# Patient Record
Sex: MALE | Race: WHITE | NOT HISPANIC OR LATINO | Employment: FULL TIME | URBAN - NONMETROPOLITAN AREA
[De-identification: names, ages, dates, MRNs, and addresses within clinical notes are randomized per-mention and may not be internally consistent; named-entity substitution may affect disease eponyms.]

---

## 2019-04-04 ENCOUNTER — OFFICE VISIT (OUTPATIENT)
Dept: URGENT CARE | Facility: PHYSICIAN GROUP | Age: 54
End: 2019-04-04
Payer: COMMERCIAL

## 2019-04-04 VITALS
HEIGHT: 68 IN | TEMPERATURE: 98.5 F | DIASTOLIC BLOOD PRESSURE: 90 MMHG | WEIGHT: 253.2 LBS | SYSTOLIC BLOOD PRESSURE: 140 MMHG | OXYGEN SATURATION: 97 % | HEART RATE: 79 BPM | BODY MASS INDEX: 38.37 KG/M2 | RESPIRATION RATE: 16 BRPM

## 2019-04-04 DIAGNOSIS — I10 ESSENTIAL HYPERTENSION: ICD-10-CM

## 2019-04-04 DIAGNOSIS — E11.9 TYPE 2 DIABETES MELLITUS WITHOUT COMPLICATION, UNSPECIFIED WHETHER LONG TERM INSULIN USE (HCC): ICD-10-CM

## 2019-04-04 DIAGNOSIS — E78.2 MIXED HYPERLIPIDEMIA: ICD-10-CM

## 2019-04-04 LAB — GLUCOSE BLD-MCNC: 337 MG/DL (ref 70–100)

## 2019-04-04 PROCEDURE — 82962 GLUCOSE BLOOD TEST: CPT | Performed by: FAMILY MEDICINE

## 2019-04-04 PROCEDURE — 99214 OFFICE O/P EST MOD 30 MIN: CPT | Performed by: FAMILY MEDICINE

## 2019-04-04 RX ORDER — LISINOPRIL 40 MG/1
40 TABLET ORAL DAILY
Qty: 90 TAB | Refills: 1 | Status: SHIPPED | OUTPATIENT
Start: 2019-04-04 | End: 2019-06-06 | Stop reason: SDUPTHER

## 2019-04-04 RX ORDER — PRAVASTATIN SODIUM 40 MG
40 TABLET ORAL
Qty: 90 TAB | Refills: 1 | Status: SHIPPED | OUTPATIENT
Start: 2019-04-04 | End: 2020-10-06

## 2019-04-04 ASSESSMENT — ENCOUNTER SYMPTOMS
DIZZINESS: 0
FOCAL WEAKNESS: 0
FEVER: 0
HEMOPTYSIS: 0
CHILLS: 0
ORTHOPNEA: 0
SHORTNESS OF BREATH: 0

## 2019-04-04 NOTE — PROGRESS NOTES
"Subjective:      Joey Dawson is a 53 y.o. male who presents with Medication Refill (has been off all his medications for 10 months has new appt with primary in May)      - This is a very pleasant, well and non-toxic appearing 53 y.o. male with complaints of out of all his HTN/DM2/HL meds for past 10 months. hasnt checked his sugars in \"awhile\".  No NVFC/CP/SOB. No headache or vision changes. Does have some polyuria past month          ALLERGIES:  Food     PMH:  Past Medical History:   Diagnosis Date   • Hyperlipidemia    • Hypertension    • Stroke (HCC)    • Type II or unspecified type diabetes mellitus without mention of complication, not stated as uncontrolled         PSH:  No past surgical history on file.    MEDS:    Current Outpatient Prescriptions:   •  Dulaglutide (TRULICITY) 1.5 MG/0.5ML Solution Pen-injector, Inject 1.5 mg as instructed every 7 days for 90 days., Disp: 1 PEN, Rfl: 12  •  lisinopril (PRINIVIL, ZESTRIL) 40 MG tablet, Take 1 Tab by mouth every day., Disp: 90 Tab, Rfl: 1  •  pravastatin (PRAVACHOL) 40 MG tablet, Take 1 Tab by mouth every bedtime., Disp: 90 Tab, Rfl: 1  •  metformin (GLUCOPHAGE) 1000 MG tablet, Take 1 Tab by mouth every day., Disp: 90 Tab, Rfl: 1  •  aspirin EC (ECOTRIN) 81 MG TBEC, Take 81 mg by mouth every day., Disp: , Rfl:   •  Multiple Vitamins-Minerals (CENTRUM) TABS, Take 1 Tab by mouth every day. (Patient not taking: Reported on 4/4/2019), Disp: 100 Tab, Rfl: 3  •  vitamin D3, cholecalciferol, 1000 UNIT TABS, Take 2 Tabs by mouth every day. (Patient not taking: Reported on 4/4/2019), Disp: 100 Tab, Rfl: 3    ** I have documented what I find to be significant in regards to past medical, social, family and surgical history  in my HPI or under PMH/PSH/FH review section, otherwise it is contributory **           HPI    Review of Systems   Constitutional: Negative for chills and fever.   Respiratory: Negative for hemoptysis and shortness of breath.    Cardiovascular: " "Negative for chest pain and orthopnea.   Neurological: Negative for dizziness and focal weakness.          Objective:     /90   Pulse 79   Temp 36.9 °C (98.5 °F) (Temporal)   Resp 16   Ht 1.727 m (5' 8\")   Wt 114.9 kg (253 lb 3.2 oz)   SpO2 97%   BMI 38.50 kg/m²      Physical Exam   Constitutional: He appears well-developed. No distress.   HENT:   Head: Normocephalic and atraumatic.   Mouth/Throat: Oropharynx is clear and moist.   Eyes: Conjunctivae are normal.   Neck: Neck supple.   Cardiovascular: Regular rhythm.    No murmur heard.  Pulmonary/Chest: Effort normal. No respiratory distress.   Neurological: He is alert. He exhibits normal muscle tone.   Skin: Skin is warm and dry.   Psychiatric: He has a normal mood and affect. Judgment normal.   Nursing note and vitals reviewed.              Assessment/Plan:         1. Essential hypertension  lisinopril (PRINIVIL, ZESTRIL) 40 MG tablet   2. Type 2 diabetes mellitus without complication, unspecified whether long term insulin use (HCC)  Dulaglutide (TRULICITY) 1.5 MG/0.5ML Solution Pen-injector    metformin (GLUCOPHAGE) 1000 MG tablet    POCT Glucose   3. Mixed hyperlipidemia  pravastatin (PRAVACHOL) 40 MG tablet             Dx & d/c instructions discussed w/ patient and/or family members.     ER precautions (worsening signs symptoms and when to go to ER) discussed.    Follow up w/ PCP in 2-3 days to make sure symptoms improving and no further intervention/treatment and/or work-up needed was advised, ER if feeling worse or not improving in 2 days.    Possible side effects (i.e. Rash, GI upset/constipation, sedation, elevation of BP or sugars) of any medications given discussed.     Patient left in stable condition              "

## 2019-05-29 ENCOUNTER — OFFICE VISIT (OUTPATIENT)
Dept: MEDICAL GROUP | Facility: PHYSICIAN GROUP | Age: 54
End: 2019-05-29
Payer: COMMERCIAL

## 2019-05-29 VITALS
DIASTOLIC BLOOD PRESSURE: 72 MMHG | HEIGHT: 68 IN | SYSTOLIC BLOOD PRESSURE: 168 MMHG | HEART RATE: 93 BPM | OXYGEN SATURATION: 97 % | BODY MASS INDEX: 38.34 KG/M2 | RESPIRATION RATE: 20 BRPM | WEIGHT: 253 LBS | TEMPERATURE: 98 F

## 2019-05-29 DIAGNOSIS — Z79.4 TYPE 2 DIABETES MELLITUS WITH OTHER SPECIFIED COMPLICATION, WITH LONG-TERM CURRENT USE OF INSULIN (HCC): ICD-10-CM

## 2019-05-29 DIAGNOSIS — E66.9 OBESITY (BMI 30-39.9): ICD-10-CM

## 2019-05-29 DIAGNOSIS — E11.69 TYPE 2 DIABETES MELLITUS WITH OTHER SPECIFIED COMPLICATION, WITH LONG-TERM CURRENT USE OF INSULIN (HCC): ICD-10-CM

## 2019-05-29 DIAGNOSIS — Z12.11 SCREENING FOR COLON CANCER: ICD-10-CM

## 2019-05-29 DIAGNOSIS — I10 ESSENTIAL HYPERTENSION: ICD-10-CM

## 2019-05-29 DIAGNOSIS — M79.89 SOFT TISSUE MASS: ICD-10-CM

## 2019-05-29 DIAGNOSIS — Z86.73 HISTORY OF CVA (CEREBROVASCULAR ACCIDENT) WITHOUT RESIDUAL DEFICITS: ICD-10-CM

## 2019-05-29 LAB
HBA1C MFR BLD: 11.1 % (ref 0–5.6)
INT CON NEG: NEGATIVE
INT CON POS: POSITIVE

## 2019-05-29 PROCEDURE — 83036 HEMOGLOBIN GLYCOSYLATED A1C: CPT | Performed by: NURSE PRACTITIONER

## 2019-05-29 PROCEDURE — 99214 OFFICE O/P EST MOD 30 MIN: CPT | Performed by: NURSE PRACTITIONER

## 2019-05-29 PROCEDURE — 92250 FUNDUS PHOTOGRAPHY W/I&R: CPT | Mod: TC | Performed by: NURSE PRACTITIONER

## 2019-05-29 RX ORDER — PEN NEEDLE, DIABETIC 30 GX5/16"
1 NEEDLE, DISPOSABLE MISCELLANEOUS DAILY
Qty: 100 EACH | Refills: 3 | Status: SHIPPED | OUTPATIENT
Start: 2019-05-29 | End: 2019-06-06 | Stop reason: SDUPTHER

## 2019-05-29 ASSESSMENT — PATIENT HEALTH QUESTIONNAIRE - PHQ9: CLINICAL INTERPRETATION OF PHQ2 SCORE: 0

## 2019-05-29 ASSESSMENT — PAIN SCALES - GENERAL: PAINLEVEL: NO PAIN

## 2019-05-29 NOTE — PATIENT INSTRUCTIONS
See me in 3 months with labs    Will have you start levemir, 15 units daily, after 1 week, increase  By 2 units every 3 days to keep FBS less than 130, stop at 25 units    Increase metformin to twice a day

## 2019-05-29 NOTE — PROGRESS NOTES
Chief Complaint   Patient presents with   • Diabetes   • Other     last optometry appt  11/2018          This is a 54 y.o.male patient that presents today with the following: Establish care with new PCP, discuss acute and chronic conditions    Obesity (BMI 30-39.9)  Is a chronic condition, uncontrolled.  Patient's weight is 253 pounds, BMI 38.47.  He does understand the risks associated with his weight, tells me that he is going to get back on track with healthy eating and regular exercise as he has let this go over the past several months.    Hypertension  This is a chronic condition,, stable and usually well controlled on current medications.  But he does tell me that he has not been taking his blood pressure medications consistently as of late.  He is due for labs, these have been ordered.    History of CVA (cerebrovascular accident) without residual deficits  Patient has remote history of CVA, this was diagnosed with outside provider before establishing care with [facility].  He is not currently followed by neurology.  He is on low-dose aspirin, he denies any residual effects.    DM2 (diabetes mellitus, type 2) ([facility])  This is a chronic condition, uncontrolled.  He does admit to me that he has not been taking his medications consistently, he is on Trulicity and metformin, A1c today was 11.1%.  He does agree to starting long-acting insulin he will start Levemir at 15 units daily and in 1 to 2 weeks he will increase by 2 units every 3 days to keep fasting blood glucose below 130 he is to stop at 25 units.  He will continue on Trulicity as well as metformin.  He is appropriately on ACE inhibitor as well as statin.    Soft tissue mass  Patient has new developed soft tissue mass just over trachea.  He does have a history of multiple lipomas.  Is not painful, it is mobile.  We will get ultrasound.      No visits with results within 1 Month(s) from this visit.   Latest known visit with results is:   Office Visit on  "04/04/2019   Component Date Value   • Glucose - Accu-Ck 04/04/2019 337*         clinical course has been stable    Past Medical History:   Diagnosis Date   • Hyperlipidemia    • Hypertension    • Stroke (HCC)    • Type II or unspecified type diabetes mellitus without mention of complication, not stated as uncontrolled        No past surgical history on file.    Family History   Problem Relation Age of Onset   • Cancer Mother         Breast cancer   • Heart Disease Father         CHF       Food    Current Outpatient Prescriptions Ordered in [facility]   Medication Sig Dispense Refill   • insulin detemir (LEVEMIR FLEXTOUCH) 100 UNIT/ML Solution Pen-injector injection Inject 25 Units as instructed every evening. Start at 15 units, increase by 2 units every 3 days to keep FBG less than 130, stop at 25 units 5 PEN 3   • Insulin Pen Needle (PEN NEEDLES 5/16\") 30G X 8 MM Misc 1 Unknown by Does not apply route every day. To be used with insulin pen 100 Each 3   • metformin (GLUCOPHAGE) 1000 MG tablet Take 1 Tab by mouth 2 times a day. 180 Tab 3   • Dulaglutide (TRULICITY) 1.5 MG/0.5ML Solution Pen-injector Inject 1.5 mg as instructed every 7 days for 90 days. 1 PEN 12   • lisinopril (PRINIVIL, ZESTRIL) 40 MG tablet Take 1 Tab by mouth every day. 90 Tab 1   • pravastatin (PRAVACHOL) 40 MG tablet Take 1 Tab by mouth every bedtime. (Patient taking differently: Take 20 mg by mouth every bedtime.) 90 Tab 1   • aspirin EC (ECOTRIN) 81 MG TBEC Take 81 mg by mouth every day.     • Multiple Vitamins-Minerals (CENTRUM) TABS Take 1 Tab by mouth every day. (Patient not taking: Reported on 5/29/2019) 100 Tab 3   • vitamin D3, cholecalciferol, 1000 UNIT TABS Take 2 Tabs by mouth every day. (Patient not taking: Reported on 5/29/2019) 100 Tab 3     No current Epic-ordered facility-administered medications on file.        Constitutional ROS: No unexpected change in weight, No weakness, No unexplained fevers, sweats, or chills  Pulmonary ROS: No " "chronic cough, sputum, or hemoptysis, No shortness of breath, No recent change in breathing  Cardiovascular ROS: No chest pain, No edema, No palpitations, Positive for hypertension and hyperlipidemia  Gastrointestinal ROS: No abdominal pain, No nausea, vomiting, diarrhea, or constipation  Musculoskeletal/Extremities ROS: No clubbing, No peripheral edema, No pain, redness or swelling on the joints  Neurologic ROS: Normal development, No seizures, No weakness, Positive for history of stroke  Current ROS: Positive per HPI    Physical exam:  BP (!) 168/72 (BP Location: Left arm, Patient Position: Sitting, BP Cuff Size: Adult)   Pulse 93   Temp 36.7 °C (98 °F) (Temporal)   Resp 20   Ht 1.727 m (5' 8\")   Wt 114.8 kg (253 lb)   SpO2 97%   BMI 38.47 kg/m²   General Appearance: Very pleasant middle-aged male, alert, no distress, obese, well-groomed  Skin: Skin color, texture, turgor normal. No rashes or lesions  Neck: Soft tissue mass just over trachea, nontender with palpation, soft and mobile.  Lungs: negative findings: normal respiratory rate and rhythm, lungs clear to auscultation  Heart: negative. RRR without murmur, gallop, or rubs.  No ectopy.  Abdomen: Abdomen soft, non-tender. BS normal. No masses,  No organomegaly  Musculoskeletal: negative findings: no evidence of joint instability, no evidence of muscle atrophy, no deformities present  Neurologic: intact, CN II through XII grossly intact  Diabetic Foot Exam: No ulcers, erythema or skin lesions present, patient tested with monofilament (10g) and tuning fork found to be sensitive bilaterally throughout the ball of the foot, great toe and heel.      Medical decision making/discussion: Patient will start Levemir as mentioned above, he will continue on metformin and Trulicity.  He is going to follow-up with me in 3 months with labs and before visit.  He also is going to get back on track with healthy eating, regular exercise and efforts towards weight loss.  He " "is due for retinal exam, this was completed today.  Patient is due for colon cancer screening, referral to gastroenterology has been placed.  We will get ultrasound of soft tissue mass    Joey was seen today for diabetes and other.    Diagnoses and all orders for this visit:    Obesity (BMI 30-39.9)  -     Patient identified as having weight management issue.  Appropriate orders and counseling given.    Type 2 diabetes mellitus with other specified complication, with long-term current use of insulin (HCC)  -     POCT A1C  -     POCT Retinal Eye Exam  -     Diabetic Monofilament Lower Extremity Exam  -     insulin detemir (LEVEMIR FLEXTOUCH) 100 UNIT/ML Solution Pen-injector injection; Inject 25 Units as instructed every evening. Start at 15 units, increase by 2 units every 3 days to keep FBG less than 130, stop at 25 units  -     Insulin Pen Needle (PEN NEEDLES 5/16\") 30G X 8 MM Misc; 1 Unknown by Does not apply route every day. To be used with insulin pen  -     metformin (GLUCOPHAGE) 1000 MG tablet; Take 1 Tab by mouth 2 times a day.  -     CBC WITH DIFFERENTIAL; Future  -     Comp Metabolic Panel; Future  -     Lipid Profile; Future  -     MICROALBUMIN CREAT RATIO URINE; Future  -     HEMOGLOBIN A1C; Future    Essential hypertension  -     Comp Metabolic Panel; Future  -     Lipid Profile; Future    Screening for colon cancer  -     REFERRAL TO GI FOR COLONOSCOPY    History of CVA (cerebrovascular accident) without residual deficits    Soft tissue mass  -     US-SOFT TISSUES OF HEAD - NECK; Future        No Follow-up on file.        Please note that this dictation was created using voice recognition software. I have made every reasonable attempt to correct obvious errors, but I expect that there are errors of grammar and possibly content that I did not discover before finalizing the note.        "

## 2019-05-30 PROBLEM — M79.89 SOFT TISSUE MASS: Status: ACTIVE | Noted: 2019-05-30

## 2019-05-30 NOTE — ASSESSMENT & PLAN NOTE
This is a chronic condition, uncontrolled.  He does admit to me that he has not been taking his medications consistently, he is on Trulicity and metformin, A1c today was 11.1%.  He does agree to starting long-acting insulin he will start Levemir at 15 units daily and in 1 to 2 weeks he will increase by 2 units every 3 days to keep fasting blood glucose below 130 he is to stop at 25 units.  He will continue on Trulicity as well as metformin.  He is appropriately on ACE inhibitor as well as statin.

## 2019-05-30 NOTE — ASSESSMENT & PLAN NOTE
Patient has new developed soft tissue mass just over trachea.  He does have a history of multiple lipomas.  Is not painful, it is mobile.  We will get ultrasound.

## 2019-05-30 NOTE — ASSESSMENT & PLAN NOTE
Is a chronic condition, uncontrolled.  Patient's weight is 253 pounds, BMI 38.47.  He does understand the risks associated with his weight, tells me that he is going to get back on track with healthy eating and regular exercise as he has let this go over the past several months.

## 2019-05-30 NOTE — ASSESSMENT & PLAN NOTE
Patient has remote history of CVA, this was diagnosed with outside provider before establishing care with [facility].  He is not currently followed by neurology.  He is on low-dose aspirin, he denies any residual effects.

## 2019-06-06 DIAGNOSIS — I10 ESSENTIAL HYPERTENSION: ICD-10-CM

## 2019-06-06 DIAGNOSIS — E11.9 TYPE 2 DIABETES MELLITUS WITHOUT COMPLICATION, UNSPECIFIED WHETHER LONG TERM INSULIN USE (HCC): ICD-10-CM

## 2019-06-06 DIAGNOSIS — Z79.4 TYPE 2 DIABETES MELLITUS WITH OTHER SPECIFIED COMPLICATION, WITH LONG-TERM CURRENT USE OF INSULIN (HCC): ICD-10-CM

## 2019-06-06 DIAGNOSIS — E11.69 TYPE 2 DIABETES MELLITUS WITH OTHER SPECIFIED COMPLICATION, WITH LONG-TERM CURRENT USE OF INSULIN (HCC): ICD-10-CM

## 2019-06-06 RX ORDER — LISINOPRIL 40 MG/1
40 TABLET ORAL DAILY
Qty: 90 TAB | Refills: 1 | Status: SHIPPED | OUTPATIENT
Start: 2019-06-06 | End: 2020-10-06 | Stop reason: SDUPTHER

## 2019-06-06 RX ORDER — PEN NEEDLE, DIABETIC 30 GX5/16"
1 NEEDLE, DISPOSABLE MISCELLANEOUS DAILY
Qty: 100 EACH | Refills: 3 | Status: SHIPPED | OUTPATIENT
Start: 2019-06-06 | End: 2021-09-15 | Stop reason: SDUPTHER

## 2019-06-06 NOTE — TELEPHONE ENCOUNTER
Pt requests if all of his current medications please be sent to the [facility] on [address] in [place]. His insurance currently wants it sent to that specific [facility].   If there are any issues, pt can be reached at [phone]  Thank you so much for your time.

## 2019-06-17 NOTE — ASSESSMENT & PLAN NOTE
This is a chronic condition,, stable and usually well controlled on current medications.  But he does tell me that he has not been taking his blood pressure medications consistently as of late.  He is due for labs, these have been ordered.   Abnormal stress test

## 2019-06-19 ENCOUNTER — HOSPITAL ENCOUNTER (OUTPATIENT)
Dept: RADIOLOGY | Facility: MEDICAL CENTER | Age: 54
End: 2019-06-19
Attending: NURSE PRACTITIONER
Payer: COMMERCIAL

## 2019-06-19 DIAGNOSIS — M79.89 SOFT TISSUE MASS: ICD-10-CM

## 2019-06-19 PROCEDURE — 76536 US EXAM OF HEAD AND NECK: CPT

## 2019-08-21 ENCOUNTER — TELEPHONE (OUTPATIENT)
Dept: MEDICAL GROUP | Facility: PHYSICIAN GROUP | Age: 54
End: 2019-08-21

## 2019-08-21 NOTE — TELEPHONE ENCOUNTER
Phone Number Called: [phone] (home)       Call outcome: left message for patient to call back regarding message below    Message: appointment needed per PCP for DETR paperwork. Please call [phone] to schedule. Thank you.    Paper work scanned into media manager.

## 2020-10-06 ENCOUNTER — OFFICE VISIT (OUTPATIENT)
Dept: MEDICAL GROUP | Facility: PHYSICIAN GROUP | Age: 55
End: 2020-10-06
Payer: COMMERCIAL

## 2020-10-06 VITALS
OXYGEN SATURATION: 96 % | HEIGHT: 68 IN | SYSTOLIC BLOOD PRESSURE: 164 MMHG | TEMPERATURE: 97.6 F | DIASTOLIC BLOOD PRESSURE: 102 MMHG | RESPIRATION RATE: 14 BRPM | HEART RATE: 68 BPM | BODY MASS INDEX: 39.71 KG/M2 | WEIGHT: 262 LBS

## 2020-10-06 DIAGNOSIS — G47.33 OBSTRUCTIVE SLEEP APNEA SYNDROME: ICD-10-CM

## 2020-10-06 DIAGNOSIS — E11.69 TYPE 2 DIABETES MELLITUS WITH OTHER SPECIFIED COMPLICATION, WITH LONG-TERM CURRENT USE OF INSULIN (HCC): ICD-10-CM

## 2020-10-06 DIAGNOSIS — M70.62 TROCHANTERIC BURSITIS OF LEFT HIP: ICD-10-CM

## 2020-10-06 DIAGNOSIS — F43.21 SITUATIONAL DEPRESSION: ICD-10-CM

## 2020-10-06 DIAGNOSIS — I10 ESSENTIAL HYPERTENSION: ICD-10-CM

## 2020-10-06 DIAGNOSIS — E78.2 MIXED HYPERLIPIDEMIA: ICD-10-CM

## 2020-10-06 DIAGNOSIS — E66.9 OBESITY (BMI 30-39.9): ICD-10-CM

## 2020-10-06 DIAGNOSIS — Z79.4 TYPE 2 DIABETES MELLITUS WITH OTHER SPECIFIED COMPLICATION, WITH LONG-TERM CURRENT USE OF INSULIN (HCC): ICD-10-CM

## 2020-10-06 PROBLEM — M70.61 TROCHANTERIC BURSITIS OF RIGHT HIP: Status: ACTIVE | Noted: 2020-10-06

## 2020-10-06 LAB
HBA1C MFR BLD: 11.7 % (ref 0–5.6)
INT CON NEG: ABNORMAL
INT CON POS: ABNORMAL

## 2020-10-06 PROCEDURE — 83036 HEMOGLOBIN GLYCOSYLATED A1C: CPT | Performed by: NURSE PRACTITIONER

## 2020-10-06 PROCEDURE — 99214 OFFICE O/P EST MOD 30 MIN: CPT | Performed by: NURSE PRACTITIONER

## 2020-10-06 RX ORDER — PRAVASTATIN SODIUM 40 MG
40 TABLET ORAL DAILY
Qty: 90 TAB | Refills: 3 | Status: SHIPPED | OUTPATIENT
Start: 2020-10-06 | End: 2020-12-02

## 2020-10-06 RX ORDER — METFORMIN HYDROCHLORIDE 500 MG/1
1000 TABLET, EXTENDED RELEASE ORAL 2 TIMES DAILY
Qty: 360 TAB | Refills: 3 | Status: SHIPPED | OUTPATIENT
Start: 2020-10-06 | End: 2021-09-15 | Stop reason: SDUPTHER

## 2020-10-06 RX ORDER — LISINOPRIL 40 MG/1
40 TABLET ORAL DAILY
Qty: 90 TAB | Refills: 3 | Status: SHIPPED | OUTPATIENT
Start: 2020-10-06 | End: 2021-08-09

## 2020-10-06 RX ORDER — NAPROXEN 500 MG/1
500 TABLET ORAL 2 TIMES DAILY WITH MEALS
Qty: 60 TAB | Refills: 1 | Status: ON HOLD | OUTPATIENT
Start: 2020-10-06 | End: 2020-11-12

## 2020-10-06 ASSESSMENT — PATIENT HEALTH QUESTIONNAIRE - PHQ9
5. POOR APPETITE OR OVEREATING: 0 - NOT AT ALL
CLINICAL INTERPRETATION OF PHQ2 SCORE: 4
SUM OF ALL RESPONSES TO PHQ QUESTIONS 1-9: 6

## 2020-10-06 NOTE — ASSESSMENT & PLAN NOTE
This is a chronic and uncontrolled condition as he has been out of medications  Now that he has insurance he like to get back on his medications which include lisinopril 40 mg daily  This has been called in for him  Labs have been ordered

## 2020-10-06 NOTE — ASSESSMENT & PLAN NOTE
The ASCVD Risk score (Thadjulianna DUBOSE Jr, et al., 2013) failed to calculate.  Agrees to restarting statin especially in the setting of his comorbid conditions of uncontrolled type 2 diabetes  This has been called in  Repeat labs in 3-4 months

## 2020-10-06 NOTE — ASSESSMENT & PLAN NOTE
Pt has sleep apnea, uses CPAP  Has not has sleep study in over 18 years  Needs updated sleep study so that he can get refills on supplies  Referral placed

## 2020-10-06 NOTE — ASSESSMENT & PLAN NOTE
This is chronic and uncontrolled  Weight is 262 pounds, BMI is 39.84  He does understand the risks associated with his weight especially in setting of comorbid conditions  Continues to work on this

## 2020-10-06 NOTE — ASSESSMENT & PLAN NOTE
Pt suffering from situational depression  Moved here from the [place] area and he is used to living in big cities and [place]  Has been looking into [place] and possibly the [facility] area  He finds that he is easily irritated and angered  He does not have any friends here  He does live here with his mother  Adamantly denies suicidal homicidal ideations, hallucinations, racing thoughts and flights of ideas  Declines a referral to psychiatry/psychology but does agree to pharmacotherapy, he will start Zoloft 50 mg daily  Discussed with him the risks, benefits and side effects of medication

## 2020-10-06 NOTE — PATIENT INSTRUCTIONS
Restart all meds, they have all been refilled    Follow up in 3 months with labs      SLEEP STUDY INSTRUCTIONS    1. Our main concern is to provide the best test and evaluation of your sleep and your cooperation in following the guidelines is very necessary.    2. We have no facilities for family members or guests at the sleep center. Special arrangements will be made for children requiring overnight sleep studies.    3. Unless otherwise instructed, AVOID alcoholic beverages on the day of your sleep study.    4. DO NOT drink coffee or caffeine-containing beverages after 12:00 noon on the day of your sleep study.    5. There is NO smoking at the sleep center.    6. Try to maintain a usual daytime schedule prior to the study (avoid unusual physical activity or meals).    7. DO NOT take a nap on the day of your study.    8. This is an outpatient procedure (test); therefore, nursing services, medications, and meals ARE NOT provided. If you take medications, bring them with you and take them on the schedule you do at home.    9. Please fill your sleep aid prescription (Ambien or Lunesta) and bring to your sleep study. Even patients who normally have no problem going to sleep often need a sleep aid in this different environment.    10. We ask that you wear conventional sleep attire (pajamas or sweats) for the sleep study. We discourage patients from wearing only their underwear to bed. We recommend two-piece pajamas as the techs will need to apply sensors to your stomach.    11. Please shampoo your hair the day of the sleep study. Please DO NOT use any other hair or skin products before your arrival (e.g., mousse, gel, hair or body spray, perfume, body lotion etc.) NOTE: Women should not wear heavy makeup prior to arrival as some wires are taped to the face area.    12. The technician will be applying several small electrodes to the scalp, eye area, chin, chest, and legs, plus respiratory effort belts around the chest.  Also, there will be a device placed directly under the nose. (THIS WILL NOT OBSTRUCT YOUR BREATHING.) This is a painless procedure and the skin is not broken.    13. The test is generally completed in six to eight hours; We are usually done between 6 - 7 a.m., unless you are scheduled for a nap study. You may need to come back another night for a second study to complete your treatment plan.    14. Patients who are scheduled for an MSLT (nap study) will stay at the sleep center for the day following their nighttime study. You will be notified if a nap study was ordered for you at the time the night study is scheduled. Generally, patients having a nap study will leave the sleep center by 4 p.m.    15. You will need to bring food for the following day if you are scheduled for a nap study. A refrigerator and microwave are available.    16. A bathroom is available for your use.    17. We are able to adjust the room temperature for your comfort. Please let the technologist know if you are uncomfortable during the study.    18. If you sleep better with a special pillow or stuffed animal, you may bring it along. Service animals are the only live animals permitted.    19. Cable T.V. is available.    20. You will be scheduled for a follow-up appointment three to five days after the sleep study to review your results.    21. A copy of your sleep study is sent to the referring physician approximately two weeks after your study.    22. Any questions can be directed to our staff at [phone].    23. If CPAP therapy is applied, a home unit will be ordered for you through the [employer] company. You will be contacted to schedule delivery after insurance authorization.

## 2020-10-06 NOTE — ASSESSMENT & PLAN NOTE
Patient has symptoms very consistent with trochanteric bursitis of the left hip  Reports it to be worsen with bowling  Has pain right over the greater trochanter  Discussed supportive care including ice or heat, over-the-counter analgesics, gentle range of motion exercises and stretches  Patient to follow-up if symptoms do not improve, worsen or he develops any other associated symptoms

## 2020-10-06 NOTE — ASSESSMENT & PLAN NOTE
This is a chronic condition, currently uncontrolled  Patient was without insurance thus he was not taking his medications  A1c is at 11.7%  Previously on metformin, Trulicity and long-acting insulin  He will restart medications including metformin 1000 mg twice daily, Trulicity 0.75 mg once a week and Levemir insulin at 15 units at bedtime  Appropriately on ACE inhibitor and agrees to starting statin  Patient to follow-up with me in 3 to 4 months with labs done before visit  Encouraged him to continue checking his fasting glucose daily  We also discussed the importance of ongoing lifestyle modifications

## 2020-10-06 NOTE — PROGRESS NOTES
Chief Complaint   Patient presents with   • Diabetes         This is a 55 y.o.male patient that presents today with the following: diabetes    DM2 (diabetes mellitus, type 2) ([facility])  This is a chronic condition, currently uncontrolled  Patient was without insurance thus he was not taking his medications  A1c is at 11.7%  Previously on metformin, Trulicity and long-acting insulin  He will restart medications including metformin 1000 mg twice daily, Trulicity 0.75 mg once a week and Levemir insulin at 15 units at bedtime  Appropriately on ACE inhibitor and agrees to starting statin  Patient to follow-up with me in 3 to 4 months with labs done before visit  Encouraged him to continue checking his fasting glucose daily  We also discussed the importance of ongoing lifestyle modifications      Hypertension  This is a chronic and uncontrolled condition as he has been out of medications  Now that he has insurance he like to get back on his medications which include lisinopril 40 mg daily  This has been called in for him  Labs have been ordered    Mixed hyperlipidemia  The ASCVD Risk score (Thadjulianna DUBOSE Jr, et al., 2013) failed to calculate.  Agrees to restarting statin especially in the setting of his comorbid conditions of uncontrolled type 2 diabetes  This has been called in  Repeat labs in 3-4 months      Sleep apnea  Pt has sleep apnea, uses CPAP  Has not has sleep study in over 18 years  Needs updated sleep study so that he can get refills on supplies  Referral placed    Situational depression  Pt suffering from situational depression  Moved here from the [place] area and he is used to living in big cities and [place]  Has been looking into [place] and possibly the [facility] area  He finds that he is easily irritated and angered  He does not have any friends here  He does live here with his mother  Adamantly denies suicidal homicidal ideations, hallucinations, racing thoughts and flights  of ideas  Declines a referral to psychiatry/psychology but does agree to pharmacotherapy, he will start Zoloft 50 mg daily  Discussed with him the risks, benefits and side effects of medication    Trochanteric bursitis of left hip  Patient has symptoms very consistent with trochanteric bursitis of the left hip  Reports it to be worsen with bowling  Has pain right over the greater trochanter  Discussed supportive care including ice or heat, over-the-counter analgesics, gentle range of motion exercises and stretches  Patient to follow-up if symptoms do not improve, worsen or he develops any other associated symptoms    Obesity (BMI 30-39.9)  This is chronic and uncontrolled  Weight is 262 pounds, BMI is 39.84  He does understand the risks associated with his weight especially in setting of comorbid conditions  Continues to work on this      No visits with results within 1 Month(s) from this visit.   Latest known visit with results is:   Office Visit on 05/29/2019   Component Date Value   • Glycohemoglobin 05/29/2019 11.1*   • Internal Control Negative 05/29/2019 Negative    • Internal Control Positive 05/29/2019 Positive          clinical course has been stable    Past Medical History:   Diagnosis Date   • Hyperlipidemia    • Hypertension    • Stroke (HCC)    • Type II or unspecified type diabetes mellitus without mention of complication, not stated as uncontrolled        No past surgical history on file.    Family History   Problem Relation Age of Onset   • Cancer Mother         Breast cancer   • Heart Disease Father         CHF       Food    Current Outpatient Medications Ordered in Epic   Medication Sig Dispense Refill   • Dulaglutide 0.75 MG/0.5ML Solution Pen-injector Inject 0.5 mL as instructed every 7 days. 6 mL 3   • insulin detemir (LEVEMIR) 100 UNIT/ML injection PEN Inject 15 Units as instructed every evening. 5 Each 3   • metFORMIN ER (GLUCOPHAGE XR) 500 MG TABLET SR 24 HR Take 2 Tabs by mouth 2 times a  "day. 360 Tab 3   • lisinopril (PRINIVIL) 40 MG tablet Take 1 Tab by mouth every day. 90 Tab 3   • pravastatin (PRAVACHOL) 40 MG tablet Take 1 Tab by mouth every day. 90 Tab 3   • sertraline (ZOLOFT) 50 MG Tab Take 1 Tab by mouth every day. 90 Tab 3   • naproxen (NAPROSYN) 500 MG Tab Take 1 Tab by mouth 2 times a day, with meals. 60 Tab 1   • aspirin EC (ECOTRIN) 81 MG TBEC Take 81 mg by mouth every day.     • Insulin Pen Needle (PEN NEEDLES 5/16\") 30G X 8 MM Misc 1 Unknown by Does not apply route every day. To be used with insulin pen 100 Each 3     No current Epic-ordered facility-administered medications on file.        Constitutional ROS: No unexpected change in weight, No weakness, No unexplained fevers, sweats, or chills  Pulmonary ROS: No chronic cough, sputum, or hemoptysis, No shortness of breath, No recent change in breathing  Cardiovascular ROS: No chest pain, No edema, No palpitations, Positive for hypertension and hyperlipidemia  Gastrointestinal ROS: No abdominal pain, No nausea, vomiting, diarrhea, or constipation  Musculoskeletal/Extremities ROS: Positive per HPI  Neurologic ROS: Normal development, No seizures, No weakness  Psychiatric ROS: Positive per HPI  Endocrine ROS: Positive per HPI    Physical exam:  BP (!) 164/102   Pulse 68   Temp 36.4 °C (97.6 °F) (Temporal)   Resp 14   Ht 1.727 m (5' 8\")   Wt 118.8 kg (262 lb)   SpO2 96%   BMI 39.84 kg/m²   General Appearance: Pleasant middle-aged male, alert, no distress, obese, well-groomed  Skin: Skin color, texture, turgor normal. No rashes or lesions.  Lungs: negative findings: normal respiratory rate and rhythm, lungs clear to auscultation  Heart: negative. RRR without murmur, gallop, or rubs.  No ectopy.  Abdomen: Abdomen soft, non-tender. BS normal. No masses,  No organomegaly  Musculoskeletal: negative findings: no evidence of joint instability, no evidence of muscle atrophy, no deformities present  Neurologic: intact, CN II through XII " grossly intact    Medical decision making/discussion:     Restart all meds, they have all been refilled    Follow up in 3 months with labs    Joey was seen today for diabetes.    Diagnoses and all orders for this visit:    Type 2 diabetes mellitus with other specified complication, with long-term current use of insulin (HCC)  -     POCT Hemoglobin A1C  -     Dulaglutide 0.75 MG/0.5ML Solution Pen-injector; Inject 0.5 mL as instructed every 7 days.  -     insulin detemir (LEVEMIR) 100 UNIT/ML injection PEN; Inject 15 Units as instructed every evening.  -     metFORMIN ER (GLUCOPHAGE XR) 500 MG TABLET SR 24 HR; Take 2 Tabs by mouth 2 times a day.  -     lisinopril (PRINIVIL) 40 MG tablet; Take 1 Tab by mouth every day.  -     Comp Metabolic Panel; Future  -     HEMOGLOBIN A1C; Future  -     Lipid Profile; Future  -     MICROALBUMIN CREAT RATIO URINE; Future    Mixed hyperlipidemia  -     pravastatin (PRAVACHOL) 40 MG tablet; Take 1 Tab by mouth every day.  -     Comp Metabolic Panel; Future  -     Lipid Profile; Future    Essential hypertension  -     lisinopril (PRINIVIL) 40 MG tablet; Take 1 Tab by mouth every day.  -     CBC WITH DIFFERENTIAL; Future  -     Comp Metabolic Panel; Future  -     Lipid Profile; Future    Situational depression  -     sertraline (ZOLOFT) 50 MG Tab; Take 1 Tab by mouth every day.    Obstructive sleep apnea syndrome  -     Polysomnography, 4 or More; Future  -     REFERRAL TO PULMONARY AND SLEEP MEDICINE Sleep Medicine    Obesity (BMI 30-39.9)  -     Patient identified as having weight management issue.  Appropriate orders and counseling given.    Trochanteric bursitis of left hip  -     naproxen (NAPROSYN) 500 MG Tab; Take 1 Tab by mouth 2 times a day, with meals.        Return in about 3 months (around 1/6/2021) for Discuss Labs, Follow-up.        Please note that this dictation was created using voice recognition software. I have made every reasonable attempt to correct obvious  errors, but I expect that there are errors of grammar and possibly content that I did not discover before finalizing the note.

## 2020-10-13 ENCOUNTER — OFFICE VISIT (OUTPATIENT)
Dept: URGENT CARE | Facility: PHYSICIAN GROUP | Age: 55
End: 2020-10-13
Payer: COMMERCIAL

## 2020-10-13 VITALS
BODY MASS INDEX: 39.47 KG/M2 | HEIGHT: 68 IN | OXYGEN SATURATION: 93 % | SYSTOLIC BLOOD PRESSURE: 168 MMHG | TEMPERATURE: 96.6 F | RESPIRATION RATE: 16 BRPM | DIASTOLIC BLOOD PRESSURE: 88 MMHG | WEIGHT: 260.4 LBS | HEART RATE: 87 BPM

## 2020-10-13 DIAGNOSIS — G57.92 NEUROPATHY OF LEFT FOOT: ICD-10-CM

## 2020-10-13 PROCEDURE — 99214 OFFICE O/P EST MOD 30 MIN: CPT | Performed by: PHYSICIAN ASSISTANT

## 2020-10-13 NOTE — PROGRESS NOTES
Chief Complaint   Patient presents with   • Foot Problem     numb and tingling x3 days, ankle to foot is tingling, no pain       HISTORY OF PRESENT ILLNESS: Patient is a 55 y.o. male who presents today for the following:    Patient comes in for evaluation of acute onset numbness/tingling in his left lower leg.  He states approximately 48 hours ago he states it felt as though his entire left lower leg was asleep all of a sudden.  He was able to walk but did feel like his footing was unstable.  He bowls and states it was difficult for him to bowl due to the decreased sensation.  He states it lasted through last night, diffusely through the left lower leg.  He woke up this morning and states it was about mid shin down and currently only feels that it about the ankle and foot.  He denies pain.  He did bump into something about 48 hours ago, making a small wound in the side of his leg.  He states what he bumped into his wrapped in plastic.  He does have uncontrolled diabetes but has no history of peripheral neuropathy.    Patient Active Problem List    Diagnosis Date Noted   • Situational depression 10/06/2020   • Trochanteric bursitis of left hip 10/06/2020   • Soft tissue mass 05/30/2019   • Obesity (BMI 30-39.9) 05/29/2019   • ED (erectile dysfunction) 06/16/2015   • Vitamin D deficiency 04/27/2015   • RBC microcytosis 04/27/2015   • Microalbuminuria 04/27/2015   • DM2 (diabetes mellitus, type 2) ([facility]) 04/09/2015   • Hypertension 04/09/2015   • Sleep apnea 04/09/2015   • Mixed hyperlipidemia 04/09/2015   • History of CVA (cerebrovascular accident) without residual deficits 04/09/2015       Allergies:Food    Current Outpatient Medications Ordered in Epic   Medication Sig Dispense Refill   • Dulaglutide 0.75 MG/0.5ML Solution Pen-injector Inject 0.5 mL as instructed every 7 days. 6 mL 3   • insulin detemir (LEVEMIR) 100 UNIT/ML injection PEN Inject 15 Units as instructed every evening. 5 Each 3   • metFORMIN ER  "(GLUCOPHAGE XR) 500 MG TABLET SR 24 HR Take 2 Tabs by mouth 2 times a day. 360 Tab 3   • lisinopril (PRINIVIL) 40 MG tablet Take 1 Tab by mouth every day. 90 Tab 3   • pravastatin (PRAVACHOL) 40 MG tablet Take 1 Tab by mouth every day. 90 Tab 3   • sertraline (ZOLOFT) 50 MG Tab Take 1 Tab by mouth every day. 90 Tab 3   • naproxen (NAPROSYN) 500 MG Tab Take 1 Tab by mouth 2 times a day, with meals. 60 Tab 1   • Insulin Pen Needle (PEN NEEDLES ") 30G X 8 MM Misc 1 Unknown by Does not apply route every day. To be used with insulin pen 100 Each 3   • aspirin EC (ECOTRIN) 81 MG TBEC Take 81 mg by mouth every day.       No current [facility]-ordered facility-administered medications on file.        Past Medical History:   Diagnosis Date   • Hyperlipidemia    • Hypertension    • Stroke (HCC)    • Type II or unspecified type diabetes mellitus without mention of complication, not stated as uncontrolled        Social History     Tobacco Use   • Smoking status: Never Smoker   • Smokeless tobacco: Never Used   Substance Use Topics   • Alcohol use: Yes     Alcohol/week: 1.2 oz     Types: 2 Cans of beer per week     Comment: yearly   • Drug use: No       Family Status   Relation Name Status   • Mo  Alive   • Fa     • Sis ##Sis1 Alive     Family History   Problem Relation Age of Onset   • Cancer Mother         Breast cancer   • Heart Disease Father         CHF       Review of Systems:    Constitutional ROS: No unexpected change in weight, No weakness, No fatigue  Pulmonary ROS: No chronic cough, sputum, or hemoptysis, No dyspnea on exertion, No wheezing  Cardiovascular ROS: No diaphoresis, No edema, No palpitations  Musculoskeletal/Extremities ROS: Neuropathy in the left lower leg  Hematologic/Lymphatic ROS: No chills, No night sweats, No weight loss  Skin/Integumentary ROS: No edema, No evidence of rash, No itching      Exam:  BP (!) 168/88   Pulse 87   Temp 35.9 °C (96.6 °F) (Temporal)   Resp 16   Ht 1.727 m (5' 8\")   " Wt 118.1 kg (260 lb 6.4 oz)   SpO2 93%   General: Well developed, well nourished. No distress.    HENT: Head is grossly normal.  Pulmonary: Unlabored respiratory effort.   Neurologic: Grossly nonfocal. No facial asymmetry noted.  Musculoskeletal: Minimal perceived sensory deficit on the distal left lower leg.  No perceived deficit on the left foot or mid to upper left lower leg.  Strong left pedal pulse.  No open skin or signs of infection noted.  Scattered small amount of petechiae noted on the foot only without any petechiae noted on the left lower leg.  Skin: Warm, dry, good turgor. No rashes in visible areas.   Psych: Normal mood. Alert and oriented to person, place and time.    Assessment/Plan:  Discussed unknown etiology.  Diabetic neuropathy typically is more gradual in onset.  It does seem to have improved since it started.  No signs of infection or other urgent pathology noted.  Recommend monitoring symptoms and following up with primary care if symptoms do not improve.  1. Neuropathy of left foot

## 2020-11-08 ENCOUNTER — SLEEP STUDY (OUTPATIENT)
Dept: SLEEP MEDICINE | Facility: MEDICAL CENTER | Age: 55
End: 2020-11-08
Attending: NURSE PRACTITIONER
Payer: COMMERCIAL

## 2020-11-08 DIAGNOSIS — G47.33 OBSTRUCTIVE SLEEP APNEA SYNDROME: ICD-10-CM

## 2020-11-08 PROCEDURE — 95811 POLYSOM 6/>YRS CPAP 4/> PARM: CPT | Performed by: FAMILY MEDICINE

## 2020-11-09 ENCOUNTER — APPOINTMENT (OUTPATIENT)
Dept: CARDIOLOGY | Facility: MEDICAL CENTER | Age: 55
DRG: 065 | End: 2020-11-09
Attending: INTERNAL MEDICINE
Payer: COMMERCIAL

## 2020-11-09 ENCOUNTER — APPOINTMENT (OUTPATIENT)
Dept: RADIOLOGY | Facility: MEDICAL CENTER | Age: 55
DRG: 065 | End: 2020-11-09
Attending: EMERGENCY MEDICINE
Payer: COMMERCIAL

## 2020-11-09 ENCOUNTER — APPOINTMENT (OUTPATIENT)
Dept: RADIOLOGY | Facility: MEDICAL CENTER | Age: 55
DRG: 065 | End: 2020-11-09
Attending: INTERNAL MEDICINE
Payer: COMMERCIAL

## 2020-11-09 ENCOUNTER — HOSPITAL ENCOUNTER (INPATIENT)
Facility: MEDICAL CENTER | Age: 55
LOS: 3 days | DRG: 065 | End: 2020-11-12
Attending: EMERGENCY MEDICINE | Admitting: INTERNAL MEDICINE
Payer: COMMERCIAL

## 2020-11-09 DIAGNOSIS — R27.0 ATAXIA: ICD-10-CM

## 2020-11-09 DIAGNOSIS — I61.3 PONTINE HEMORRHAGE (HCC): ICD-10-CM

## 2020-11-09 DIAGNOSIS — R53.1 LEFT-SIDED WEAKNESS: ICD-10-CM

## 2020-11-09 DIAGNOSIS — I10 UNCONTROLLED HYPERTENSION: ICD-10-CM

## 2020-11-09 PROBLEM — R79.89 ELEVATED TROPONIN: Status: ACTIVE | Noted: 2020-11-09

## 2020-11-09 LAB
ABO + RH BLD: NORMAL
ABO GROUP BLD: NORMAL
ALBUMIN SERPL BCP-MCNC: 4.1 G/DL (ref 3.2–4.9)
ALBUMIN/GLOB SERPL: 1.6 G/DL
ALP SERPL-CCNC: 91 U/L (ref 30–99)
ALT SERPL-CCNC: 27 U/L (ref 2–50)
ANION GAP SERPL CALC-SCNC: 11 MMOL/L (ref 7–16)
APTT PPP: 25.2 SEC (ref 24.7–36)
AST SERPL-CCNC: 17 U/L (ref 12–45)
BASOPHILS # BLD AUTO: 0.5 % (ref 0–1.8)
BASOPHILS # BLD: 0.03 K/UL (ref 0–0.12)
BILIRUB SERPL-MCNC: 0.5 MG/DL (ref 0.1–1.5)
BLD GP AB SCN SERPL QL: NORMAL
BUN SERPL-MCNC: 15 MG/DL (ref 8–22)
CALCIUM SERPL-MCNC: 8.9 MG/DL (ref 8.5–10.5)
CFT BLD TEG: 6.1 MIN (ref 5–10)
CHLORIDE SERPL-SCNC: 100 MMOL/L (ref 96–112)
CHOLEST SERPL-MCNC: 126 MG/DL (ref 100–199)
CLOT ANGLE BLD TEG: 57.5 DEGREES (ref 53–72)
CLOT LYSIS 30M P MA LENFR BLD TEG: 0 % (ref 0–8)
CO2 SERPL-SCNC: 24 MMOL/L (ref 20–33)
COVID ORDER STATUS COVID19: NORMAL
CREAT SERPL-MCNC: 0.75 MG/DL (ref 0.5–1.4)
CT.EXTRINSIC BLD ROTEM: 2.6 MIN (ref 1–3)
EKG IMPRESSION: NORMAL
EOSINOPHIL # BLD AUTO: 0.14 K/UL (ref 0–0.51)
EOSINOPHIL NFR BLD: 2.4 % (ref 0–6.9)
ERYTHROCYTE [DISTWIDTH] IN BLOOD BY AUTOMATED COUNT: 44.3 FL (ref 35.9–50)
EST. AVERAGE GLUCOSE BLD GHB EST-MCNC: 260 MG/DL
GLOBULIN SER CALC-MCNC: 2.6 G/DL (ref 1.9–3.5)
GLUCOSE BLD-MCNC: 184 MG/DL (ref 65–99)
GLUCOSE SERPL-MCNC: 222 MG/DL (ref 65–99)
HBA1C MFR BLD: 10.7 % (ref 0–5.6)
HCT VFR BLD AUTO: 45.6 % (ref 42–52)
HDLC SERPL-MCNC: 38 MG/DL
HGB BLD-MCNC: 15 G/DL (ref 14–18)
IMM GRANULOCYTES # BLD AUTO: 0.02 K/UL (ref 0–0.11)
IMM GRANULOCYTES NFR BLD AUTO: 0.3 % (ref 0–0.9)
INR PPP: 0.96 (ref 0.87–1.13)
LDLC SERPL CALC-MCNC: 72 MG/DL
LV EJECT FRACT  99904: 60
LYMPHOCYTES # BLD AUTO: 1.59 K/UL (ref 1–4.8)
LYMPHOCYTES NFR BLD: 26.9 % (ref 22–41)
MAGNESIUM SERPL-MCNC: 1.7 MG/DL (ref 1.5–2.5)
MCF BLD TEG: 60.5 MM (ref 50–70)
MCH RBC QN AUTO: 26.5 PG (ref 27–33)
MCHC RBC AUTO-ENTMCNC: 32.9 G/DL (ref 33.7–35.3)
MCV RBC AUTO: 80.4 FL (ref 81.4–97.8)
MONOCYTES # BLD AUTO: 0.46 K/UL (ref 0–0.85)
MONOCYTES NFR BLD AUTO: 7.8 % (ref 0–13.4)
NEUTROPHILS # BLD AUTO: 3.68 K/UL (ref 1.82–7.42)
NEUTROPHILS NFR BLD: 62.1 % (ref 44–72)
NRBC # BLD AUTO: 0 K/UL
NRBC BLD-RTO: 0 /100 WBC
PA AA BLD-ACNC: 0 %
PA ADP BLD-ACNC: 0 %
PHOSPHATE SERPL-MCNC: 2.4 MG/DL (ref 2.5–4.5)
PLATELET # BLD AUTO: 185 K/UL (ref 164–446)
PMV BLD AUTO: 10 FL (ref 9–12.9)
POTASSIUM SERPL-SCNC: 4 MMOL/L (ref 3.6–5.5)
PROT SERPL-MCNC: 6.7 G/DL (ref 6–8.2)
PROTHROMBIN TIME: 13.1 SEC (ref 12–14.6)
RBC # BLD AUTO: 5.67 M/UL (ref 4.7–6.1)
RH BLD: NORMAL
SARS-COV-2 RNA RESP QL NAA+PROBE: NOTDETECTED
SODIUM SERPL-SCNC: 132 MMOL/L (ref 135–145)
SODIUM SERPL-SCNC: 134 MMOL/L (ref 135–145)
SODIUM SERPL-SCNC: 135 MMOL/L (ref 135–145)
SODIUM SERPL-SCNC: 137 MMOL/L (ref 135–145)
SPECIMEN SOURCE: NORMAL
TEG ALGORITHM TGALG: NORMAL
TRIGL SERPL-MCNC: 82 MG/DL (ref 0–149)
TROPONIN T SERPL-MCNC: 26 NG/L (ref 6–19)
TROPONIN T SERPL-MCNC: 27 NG/L (ref 6–19)
WBC # BLD AUTO: 5.9 K/UL (ref 4.8–10.8)

## 2020-11-09 PROCEDURE — 80053 COMPREHEN METABOLIC PANEL: CPT

## 2020-11-09 PROCEDURE — 85576 BLOOD PLATELET AGGREGATION: CPT

## 2020-11-09 PROCEDURE — 83735 ASSAY OF MAGNESIUM: CPT

## 2020-11-09 PROCEDURE — 85730 THROMBOPLASTIN TIME PARTIAL: CPT

## 2020-11-09 PROCEDURE — 93306 TTE W/DOPPLER COMPLETE: CPT | Mod: 26 | Performed by: INTERNAL MEDICINE

## 2020-11-09 PROCEDURE — 770022 HCHG ROOM/CARE - ICU (200)

## 2020-11-09 PROCEDURE — 700101 HCHG RX REV CODE 250: Performed by: INTERNAL MEDICINE

## 2020-11-09 PROCEDURE — 700105 HCHG RX REV CODE 258: Performed by: INTERNAL MEDICINE

## 2020-11-09 PROCEDURE — 0042T CT-CEREBRAL PERFUSION ANALYSIS: CPT

## 2020-11-09 PROCEDURE — 70496 CT ANGIOGRAPHY HEAD: CPT

## 2020-11-09 PROCEDURE — 700105 HCHG RX REV CODE 258: Performed by: EMERGENCY MEDICINE

## 2020-11-09 PROCEDURE — 96365 THER/PROPH/DIAG IV INF INIT: CPT

## 2020-11-09 PROCEDURE — 70498 CT ANGIOGRAPHY NECK: CPT

## 2020-11-09 PROCEDURE — 84484 ASSAY OF TROPONIN QUANT: CPT | Mod: 91

## 2020-11-09 PROCEDURE — 82962 GLUCOSE BLOOD TEST: CPT

## 2020-11-09 PROCEDURE — 93005 ELECTROCARDIOGRAM TRACING: CPT | Performed by: EMERGENCY MEDICINE

## 2020-11-09 PROCEDURE — 83036 HEMOGLOBIN GLYCOSYLATED A1C: CPT

## 2020-11-09 PROCEDURE — 99291 CRITICAL CARE FIRST HOUR: CPT | Performed by: EMERGENCY MEDICINE

## 2020-11-09 PROCEDURE — 700102 HCHG RX REV CODE 250 W/ 637 OVERRIDE(OP): Performed by: EMERGENCY MEDICINE

## 2020-11-09 PROCEDURE — 80061 LIPID PANEL: CPT

## 2020-11-09 PROCEDURE — 86850 RBC ANTIBODY SCREEN: CPT

## 2020-11-09 PROCEDURE — 93306 TTE W/DOPPLER COMPLETE: CPT

## 2020-11-09 PROCEDURE — 85347 COAGULATION TIME ACTIVATED: CPT

## 2020-11-09 PROCEDURE — 70450 CT HEAD/BRAIN W/O DYE: CPT

## 2020-11-09 PROCEDURE — 700117 HCHG RX CONTRAST REV CODE 255: Performed by: INTERNAL MEDICINE

## 2020-11-09 PROCEDURE — 99255 IP/OBS CONSLTJ NEW/EST HI 80: CPT | Performed by: PSYCHIATRY & NEUROLOGY

## 2020-11-09 PROCEDURE — 36415 COLL VENOUS BLD VENIPUNCTURE: CPT

## 2020-11-09 PROCEDURE — 86900 BLOOD TYPING SEROLOGIC ABO: CPT

## 2020-11-09 PROCEDURE — 96375 TX/PRO/DX INJ NEW DRUG ADDON: CPT

## 2020-11-09 PROCEDURE — C9803 HOPD COVID-19 SPEC COLLECT: HCPCS | Performed by: HOSPITALIST

## 2020-11-09 PROCEDURE — 85025 COMPLETE CBC W/AUTO DIFF WBC: CPT

## 2020-11-09 PROCEDURE — 85610 PROTHROMBIN TIME: CPT

## 2020-11-09 PROCEDURE — 92610 EVALUATE SWALLOWING FUNCTION: CPT

## 2020-11-09 PROCEDURE — 84100 ASSAY OF PHOSPHORUS: CPT

## 2020-11-09 PROCEDURE — 86901 BLOOD TYPING SEROLOGIC RH(D): CPT

## 2020-11-09 PROCEDURE — A9270 NON-COVERED ITEM OR SERVICE: HCPCS | Performed by: INTERNAL MEDICINE

## 2020-11-09 PROCEDURE — 99223 1ST HOSP IP/OBS HIGH 75: CPT | Performed by: INTERNAL MEDICINE

## 2020-11-09 PROCEDURE — 71045 X-RAY EXAM CHEST 1 VIEW: CPT

## 2020-11-09 PROCEDURE — 700102 HCHG RX REV CODE 250 W/ 637 OVERRIDE(OP): Performed by: INTERNAL MEDICINE

## 2020-11-09 PROCEDURE — U0003 INFECTIOUS AGENT DETECTION BY NUCLEIC ACID (DNA OR RNA); SEVERE ACUTE RESPIRATORY SYNDROME CORONAVIRUS 2 (SARS-COV-2) (CORONAVIRUS DISEASE [COVID-19]), AMPLIFIED PROBE TECHNIQUE, MAKING USE OF HIGH THROUGHPUT TECHNOLOGIES AS DESCRIBED BY CMS-2020-01-R: HCPCS

## 2020-11-09 PROCEDURE — 96366 THER/PROPH/DIAG IV INF ADDON: CPT

## 2020-11-09 PROCEDURE — 700101 HCHG RX REV CODE 250: Performed by: EMERGENCY MEDICINE

## 2020-11-09 PROCEDURE — 85384 FIBRINOGEN ACTIVITY: CPT

## 2020-11-09 PROCEDURE — 700117 HCHG RX CONTRAST REV CODE 255: Performed by: EMERGENCY MEDICINE

## 2020-11-09 PROCEDURE — 94760 N-INVAS EAR/PLS OXIMETRY 1: CPT

## 2020-11-09 PROCEDURE — 84295 ASSAY OF SERUM SODIUM: CPT | Mod: 91

## 2020-11-09 PROCEDURE — 99291 CRITICAL CARE FIRST HOUR: CPT

## 2020-11-09 RX ORDER — ACETAMINOPHEN 650 MG/1
650 SUPPOSITORY RECTAL EVERY 4 HOURS PRN
Status: DISCONTINUED | OUTPATIENT
Start: 2020-11-09 | End: 2020-11-12 | Stop reason: HOSPADM

## 2020-11-09 RX ORDER — PROCHLORPERAZINE EDISYLATE 5 MG/ML
5-10 INJECTION INTRAMUSCULAR; INTRAVENOUS EVERY 4 HOURS PRN
Status: DISCONTINUED | OUTPATIENT
Start: 2020-11-09 | End: 2020-11-12 | Stop reason: HOSPADM

## 2020-11-09 RX ORDER — INSULIN GLARGINE 100 [IU]/ML
15 INJECTION, SOLUTION SUBCUTANEOUS EVERY EVENING
Status: DISCONTINUED | OUTPATIENT
Start: 2020-11-09 | End: 2020-11-11

## 2020-11-09 RX ORDER — AMOXICILLIN 250 MG
2 CAPSULE ORAL 2 TIMES DAILY
Status: DISCONTINUED | OUTPATIENT
Start: 2020-11-09 | End: 2020-11-12 | Stop reason: HOSPADM

## 2020-11-09 RX ORDER — ONDANSETRON 2 MG/ML
4 INJECTION INTRAMUSCULAR; INTRAVENOUS EVERY 4 HOURS PRN
Status: DISCONTINUED | OUTPATIENT
Start: 2020-11-09 | End: 2020-11-12 | Stop reason: HOSPADM

## 2020-11-09 RX ORDER — ACETAMINOPHEN 325 MG/1
650 TABLET ORAL EVERY 6 HOURS PRN
Status: DISCONTINUED | OUTPATIENT
Start: 2020-11-09 | End: 2020-11-09

## 2020-11-09 RX ORDER — LABETALOL HYDROCHLORIDE 5 MG/ML
10 INJECTION, SOLUTION INTRAVENOUS ONCE
Status: COMPLETED | OUTPATIENT
Start: 2020-11-09 | End: 2020-11-09

## 2020-11-09 RX ORDER — LISINOPRIL 20 MG/1
40 TABLET ORAL EVERY EVENING
Status: DISCONTINUED | OUTPATIENT
Start: 2020-11-09 | End: 2020-11-12 | Stop reason: HOSPADM

## 2020-11-09 RX ORDER — ONDANSETRON 4 MG/1
4 TABLET, ORALLY DISINTEGRATING ORAL EVERY 4 HOURS PRN
Status: DISCONTINUED | OUTPATIENT
Start: 2020-11-09 | End: 2020-11-12 | Stop reason: HOSPADM

## 2020-11-09 RX ORDER — PROMETHAZINE HYDROCHLORIDE 25 MG/1
12.5-25 SUPPOSITORY RECTAL EVERY 4 HOURS PRN
Status: DISCONTINUED | OUTPATIENT
Start: 2020-11-09 | End: 2020-11-12 | Stop reason: HOSPADM

## 2020-11-09 RX ORDER — BISACODYL 10 MG
10 SUPPOSITORY, RECTAL RECTAL
Status: DISCONTINUED | OUTPATIENT
Start: 2020-11-09 | End: 2020-11-12 | Stop reason: HOSPADM

## 2020-11-09 RX ORDER — POLYETHYLENE GLYCOL 3350 17 G/17G
1 POWDER, FOR SOLUTION ORAL
Status: DISCONTINUED | OUTPATIENT
Start: 2020-11-09 | End: 2020-11-12 | Stop reason: HOSPADM

## 2020-11-09 RX ORDER — DEXTROSE MONOHYDRATE 25 G/50ML
50 INJECTION, SOLUTION INTRAVENOUS
Status: DISCONTINUED | OUTPATIENT
Start: 2020-11-09 | End: 2020-11-09

## 2020-11-09 RX ORDER — ONDANSETRON 2 MG/ML
4 INJECTION INTRAMUSCULAR; INTRAVENOUS EVERY 4 HOURS PRN
Status: DISCONTINUED | OUTPATIENT
Start: 2020-11-09 | End: 2020-11-09

## 2020-11-09 RX ORDER — PRAVASTATIN SODIUM 20 MG
40 TABLET ORAL EVERY EVENING
Status: DISCONTINUED | OUTPATIENT
Start: 2020-11-09 | End: 2020-11-12 | Stop reason: HOSPADM

## 2020-11-09 RX ORDER — PROMETHAZINE HYDROCHLORIDE 25 MG/1
12.5-25 TABLET ORAL EVERY 4 HOURS PRN
Status: DISCONTINUED | OUTPATIENT
Start: 2020-11-09 | End: 2020-11-12 | Stop reason: HOSPADM

## 2020-11-09 RX ORDER — CLONIDINE HYDROCHLORIDE 0.1 MG/1
0.1 TABLET ORAL EVERY 6 HOURS PRN
Status: DISCONTINUED | OUTPATIENT
Start: 2020-11-09 | End: 2020-11-12 | Stop reason: HOSPADM

## 2020-11-09 RX ORDER — LABETALOL HYDROCHLORIDE 5 MG/ML
10 INJECTION, SOLUTION INTRAVENOUS EVERY 4 HOURS PRN
Status: DISCONTINUED | OUTPATIENT
Start: 2020-11-09 | End: 2020-11-10

## 2020-11-09 RX ORDER — DEXTROSE MONOHYDRATE 25 G/50ML
50 INJECTION, SOLUTION INTRAVENOUS
Status: DISCONTINUED | OUTPATIENT
Start: 2020-11-09 | End: 2020-11-12 | Stop reason: HOSPADM

## 2020-11-09 RX ORDER — ACETAMINOPHEN 325 MG/1
650 TABLET ORAL EVERY 4 HOURS PRN
Status: DISCONTINUED | OUTPATIENT
Start: 2020-11-09 | End: 2020-11-12 | Stop reason: HOSPADM

## 2020-11-09 RX ORDER — ONDANSETRON 4 MG/1
4 TABLET, ORALLY DISINTEGRATING ORAL EVERY 4 HOURS PRN
Status: DISCONTINUED | OUTPATIENT
Start: 2020-11-09 | End: 2020-11-09

## 2020-11-09 RX ORDER — LORAZEPAM 2 MG/ML
2 INJECTION INTRAMUSCULAR
Status: DISCONTINUED | OUTPATIENT
Start: 2020-11-09 | End: 2020-11-10

## 2020-11-09 RX ORDER — SODIUM CHLORIDE 9 MG/ML
INJECTION, SOLUTION INTRAVENOUS CONTINUOUS
Status: DISCONTINUED | OUTPATIENT
Start: 2020-11-09 | End: 2020-11-10

## 2020-11-09 RX ADMIN — PRAVASTATIN SODIUM 40 MG: 20 TABLET ORAL at 17:47

## 2020-11-09 RX ADMIN — SODIUM CHLORIDE 5 MG/HR: 9 INJECTION, SOLUTION INTRAVENOUS at 08:49

## 2020-11-09 RX ADMIN — DOCUSATE SODIUM 50 MG AND SENNOSIDES 8.6 MG 2 TABLET: 8.6; 5 TABLET, FILM COATED ORAL at 17:46

## 2020-11-09 RX ADMIN — LABETALOL HYDROCHLORIDE 10 MG: 5 INJECTION, SOLUTION INTRAVENOUS at 08:24

## 2020-11-09 RX ADMIN — HUMAN ALBUMIN MICROSPHERES AND PERFLUTREN 3 ML: 10; .22 INJECTION, SOLUTION INTRAVENOUS at 15:17

## 2020-11-09 RX ADMIN — IOHEXOL 80 ML: 350 INJECTION, SOLUTION INTRAVENOUS at 08:45

## 2020-11-09 RX ADMIN — SODIUM CHLORIDE: 9 INJECTION, SOLUTION INTRAVENOUS at 09:30

## 2020-11-09 RX ADMIN — LABETALOL HYDROCHLORIDE 10 MG: 5 INJECTION, SOLUTION INTRAVENOUS at 08:07

## 2020-11-09 RX ADMIN — SODIUM CHLORIDE 4 MG/HR: 9 INJECTION, SOLUTION INTRAVENOUS at 18:50

## 2020-11-09 RX ADMIN — LISINOPRIL 40 MG: 20 TABLET ORAL at 17:34

## 2020-11-09 RX ADMIN — SODIUM CHLORIDE: 9 INJECTION, SOLUTION INTRAVENOUS at 17:11

## 2020-11-09 RX ADMIN — INSULIN HUMAN 2 UNITS: 100 INJECTION, SOLUTION PARENTERAL at 17:39

## 2020-11-09 RX ADMIN — INSULIN GLARGINE 15 UNITS: 100 INJECTION, SOLUTION SUBCUTANEOUS at 17:39

## 2020-11-09 RX ADMIN — INSULIN HUMAN 2 UNITS: 100 INJECTION, SOLUTION PARENTERAL at 23:58

## 2020-11-09 RX ADMIN — SERTRALINE HYDROCHLORIDE 50 MG: 50 TABLET ORAL at 17:48

## 2020-11-09 RX ADMIN — IOHEXOL 40 ML: 350 INJECTION, SOLUTION INTRAVENOUS at 08:45

## 2020-11-09 ASSESSMENT — ENCOUNTER SYMPTOMS
NAUSEA: 0
FALLS: 0
ABDOMINAL PAIN: 0
NECK PAIN: 0
BACK PAIN: 0
FEVER: 0
DEPRESSION: 0
PALPITATIONS: 0
WEIGHT LOSS: 0
SHORTNESS OF BREATH: 0
SORE THROAT: 0
VOMITING: 0
COUGH: 0
BRUISES/BLEEDS EASILY: 0
DIZZINESS: 0
CHILLS: 0
WHEEZING: 0
MYALGIAS: 0

## 2020-11-09 ASSESSMENT — PATIENT HEALTH QUESTIONNAIRE - PHQ9
SUM OF ALL RESPONSES TO PHQ9 QUESTIONS 1 AND 2: 0
2. FEELING DOWN, DEPRESSED, IRRITABLE, OR HOPELESS: NOT AT ALL
1. LITTLE INTEREST OR PLEASURE IN DOING THINGS: NOT AT ALL

## 2020-11-09 ASSESSMENT — LIFESTYLE VARIABLES
ON A TYPICAL DAY WHEN YOU DRINK ALCOHOL HOW MANY DRINKS DO YOU HAVE: 0
HAVE YOU EVER FELT YOU SHOULD CUT DOWN ON YOUR DRINKING: NO
CONSUMPTION TOTAL: NEGATIVE
TOTAL SCORE: 0
TOTAL SCORE: 0
AVERAGE NUMBER OF DAYS PER WEEK YOU HAVE A DRINK CONTAINING ALCOHOL: 0
DOES PATIENT WANT TO STOP DRINKING: NO
TOTAL SCORE: 0
HAVE PEOPLE ANNOYED YOU BY CRITICIZING YOUR DRINKING: NO
HOW MANY TIMES IN THE PAST YEAR HAVE YOU HAD 5 OR MORE DRINKS IN A DAY: 0
EVER HAD A DRINK FIRST THING IN THE MORNING TO STEADY YOUR NERVES TO GET RID OF A HANGOVER: NO
EVER FELT BAD OR GUILTY ABOUT YOUR DRINKING: NO
ALCOHOL_USE: NO

## 2020-11-09 ASSESSMENT — FIBROSIS 4 INDEX
FIB4 SCORE: 1.01
FIB4 SCORE: 1.01

## 2020-11-09 ASSESSMENT — COGNITIVE AND FUNCTIONAL STATUS - GENERAL
SUGGESTED CMS G CODE MODIFIER MOBILITY: CJ
MOBILITY SCORE: 20
HELP NEEDED FOR BATHING: A LITTLE
STANDING UP FROM CHAIR USING ARMS: A LITTLE
WALKING IN HOSPITAL ROOM: A LITTLE
SUGGESTED CMS G CODE MODIFIER DAILY ACTIVITY: CJ
CLIMB 3 TO 5 STEPS WITH RAILING: A LITTLE
MOVING FROM LYING ON BACK TO SITTING ON SIDE OF FLAT BED: A LITTLE
DRESSING REGULAR UPPER BODY CLOTHING: A LITTLE
DAILY ACTIVITIY SCORE: 22

## 2020-11-09 ASSESSMENT — COPD QUESTIONNAIRES
DURING THE PAST 4 WEEKS HOW MUCH DID YOU FEEL SHORT OF BREATH: NONE/LITTLE OF THE TIME
DO YOU EVER COUGH UP ANY MUCUS OR PHLEGM?: NO/ONLY WITH OCCASIONAL COLDS OR INFECTIONS
HAVE YOU SMOKED AT LEAST 100 CIGARETTES IN YOUR ENTIRE LIFE: NO/DON'T KNOW
COPD SCREENING SCORE: 1

## 2020-11-09 ASSESSMENT — PAIN DESCRIPTION - PAIN TYPE
TYPE: ACUTE PAIN
TYPE: ACUTE PAIN

## 2020-11-09 NOTE — ED TRIAGE NOTES
Pt presents to ED by EMS with c/o waking up this morning after a sleepy study, took a shower and then noticed his left arm was weak. This was about 7AM when pt noticed symptoms. When pt started to walk he reported left leg weakness as well. Pt states the he had weakness in an artery in the back of his head in the past that was treated as a stroke. Pt also states he has a right sided headache. Pt stated he felt like he had slurred speak but does not appear to have any at this time. Pt has a left arm inward drift with neuro assessment. Pt does not appear to be in any apparent distress. BS was 156 per EMS. Pt placed on cardiac, BP, and SPO2 monitors. Call light within reach.

## 2020-11-09 NOTE — CONSULTS
Critical Care Consultation    Date of consult: 11/9/2020    Referring Physician  Yessi Brunson D.O.    Reason for Consultation  Pontine hemorrhage, uncontrolled hypertension    History of Presenting Illness  55 y.o. male who presented 11/9/2020 with diabetes, hypertension, hyperlipidemia, stroke, sleep apnea who underwent a sleep study last night and after taking a shower this morning he developed sudden onset of left upper and lower extremity weakness, slurred speech, and right-sided headache at 0650.  He denies fall, chest pain, shortness of breath, vision change.  After the symptoms persisted he called EMS and presented to the emergency department.    Patient was started on his current medication regimen approximately 5 weeks ago and states he has been compliant since.    Patient has a history of ischemic CVA approximately 8 years ago that resulted in decreased peripheral vision.    Code Status  Full Code    Review of Systems  Review of Systems   Constitutional: Negative for chills, fever and weight loss.   HENT: Negative for congestion, hearing loss and sore throat.    Respiratory: Negative for cough, shortness of breath and wheezing.    Cardiovascular: Negative for chest pain, palpitations and leg swelling.   Gastrointestinal: Negative for abdominal pain, nausea and vomiting.   Genitourinary: Negative for dysuria, frequency and urgency.   Musculoskeletal: Negative for back pain, falls, myalgias and neck pain.   Skin: Negative for itching and rash.   Neurological: Negative for dizziness.   Endo/Heme/Allergies: Does not bruise/bleed easily.   Psychiatric/Behavioral: Negative for depression.   All other systems reviewed and are negative.      Past Medical History   has a past medical history of Hyperlipidemia, Hypertension, Stroke (HCC), and Type II or unspecified type diabetes mellitus without mention of complication, not stated as uncontrolled.    Surgical History   has no past surgical history on  "file.    Family History  family history includes Cancer in his mother; Heart Disease in his father.    Social History   reports that he has never smoked. He has never used smokeless tobacco. He reports current alcohol use of about 1.2 oz of alcohol per week. He reports that he does not use drugs.    Medications  Home Medications     Reviewed by Letty Cazares (Pharmacy Tech) on 11/09/20 at 0904  Med List Status: Complete   Medication Last Dose Status   aspirin EC (ECOTRIN) 81 MG TBEC 11/8/2020 Active   Dulaglutide 0.75 MG/0.5ML Solution Pen-injector 11/6/2020 Active   insulin detemir (LEVEMIR) 100 UNIT/ML injection PEN 11/8/2020 Active   Insulin Pen Needle (PEN NEEDLES 5/16\") 30G X 8 MM Misc  Active   lisinopril (PRINIVIL) 40 MG tablet 11/8/2020 Active   metFORMIN ER (GLUCOPHAGE XR) 500 MG TABLET SR 24 HR 11/8/2020 Active   naproxen (NAPROSYN) 500 MG Tab 11/8/2020 Active   pravastatin (PRAVACHOL) 40 MG tablet 11/8/2020 Active   sertraline (ZOLOFT) 50 MG Tab 11/8/2020 Active              Current Facility-Administered Medications   Medication Dose Route Frequency Provider Last Rate Last Admin   • niCARdipine (CARDENE) 25 mg in  mL Infusion  0-15 mg/hr Intravenous Continuous Yessi Brunson D.O. 50 mL/hr at 11/09/20 0849 5 mg/hr at 11/09/20 0849   • insulin detemir (Levemir) injection PEN  15 Units Subcutaneous Q EVENING Hang Cutler M.D.       • lisinopril (PRINIVIL) TABS 40 mg  40 mg Oral DAILY Hang Cutler M.D.       • pravastatin (PRAVACHOL) tablet 40 mg  40 mg Oral DAILY Hang Cutler M.D.       • sertraline (Zoloft) tablet 50 mg  50 mg Oral DAILY Hang Ctuler M.D.       • Respiratory Therapy Consult   Nebulization Continuous RT Hang Cutler M.D.       • NS infusion   Intravenous Continuous Hang Cutler M.D.       • ondansetron (ZOFRAN ODT) dispertab 4 mg  4 mg Oral Q4HRS PRN Hang Cutler M.D.       • acetaminophen (Tylenol) tablet 650 mg  650 mg Oral Q4HRS PRN Hang Cutler, " M.D.        Or   • acetaminophen (TYLENOL) suppository 650 mg  650 mg Rectal Q4HRS PRN Hang Cutler M.D.       • LORazepam (ATIVAN) injection 2 mg  2 mg Intravenous Q5 MIN PRN Hang Cutler M.D.       • MD Alert...ICU Electrolyte Replacement per Pharmacy   Other PHARMACY TO DOSE Hagn Cutler M.D.       • cloNIDine (CATAPRES) tablet 0.1 mg  0.1 mg Oral Q6HRS PRN Hang Cutler M.D.       • labetalol (NORMODYNE/TRANDATE) injection 10 mg  10 mg Intravenous Q4HRS PRN Hang Cutler M.D.       • ondansetron (ZOFRAN) syringe/vial injection 4 mg  4 mg Intravenous Q4HRS PRN Hang Cutler M.D.       • promethazine (PHENERGAN) tablet 12.5-25 mg  12.5-25 mg Oral Q4HRS PRN Hang Cutler M.D.       • promethazine (PHENERGAN) suppository 12.5-25 mg  12.5-25 mg Rectal Q4HRS PRN Hang Cutler M.D.       • prochlorperazine (COMPAZINE) injection 5-10 mg  5-10 mg Intravenous Q4HRS PRN Hang Cutler M.D.       • senna-docusate (PERICOLACE or SENOKOT S) 8.6-50 MG per tablet 2 Tab  2 Tab Oral BID Hang Cutler M.D.        And   • polyethylene glycol/lytes (MIRALAX) PACKET 1 Packet  1 Packet Oral QDAY PRN Hang Cutler M.D.        And   • magnesium hydroxide (MILK OF MAGNESIA) suspension 30 mL  30 mL Oral QDAY PRN Hang Cutler M.D.        And   • bisacodyl (DULCOLAX) suppository 10 mg  10 mg Rectal QDAY PRN Hang Cutler M.D.       • insulin regular (HumuLIN R,NovoLIN R) injection  2-9 Units Subcutaneous 4X/DAY ACHSRINIVAS Cutler M.D.        And   • glucose 4 g chewable tablet 16 g  16 g Oral Q15 MIN PRN Hang Cutler M.D.        And   • dextrose 50% (D50W) injection 50 mL  50 mL Intravenous Q15 MIN PRN Hang Cutler M.D.         Current Outpatient Medications   Medication Sig Dispense Refill   • Dulaglutide 0.75 MG/0.5ML Solution Pen-injector Inject 0.5 mL as instructed every 7 days. 6 mL 3   • insulin detemir (LEVEMIR) 100 UNIT/ML injection PEN Inject 15 Units as instructed every evening. 5 Each 3   •  "metFORMIN ER (GLUCOPHAGE XR) 500 MG TABLET SR 24 HR Take 2 Tabs by mouth 2 times a day. (Patient taking differently: Take 1,000 mg by mouth 2 times a day. 2 tablets = 1000 mg) 360 Tab 3   • lisinopril (PRINIVIL) 40 MG tablet Take 1 Tab by mouth every day. 90 Tab 3   • pravastatin (PRAVACHOL) 40 MG tablet Take 1 Tab by mouth every day. (Patient taking differently: Take 40 mg by mouth every evening.) 90 Tab 3   • sertraline (ZOLOFT) 50 MG Tab Take 1 Tab by mouth every day. (Patient taking differently: Take 50 mg by mouth every evening.) 90 Tab 3   • naproxen (NAPROSYN) 500 MG Tab Take 1 Tab by mouth 2 times a day, with meals. 60 Tab 1   • Insulin Pen Needle (PEN NEEDLES 5/16\") 30G X 8 MM Misc 1 Unknown by Does not apply route every day. To be used with insulin pen 100 Each 3   • aspirin EC (ECOTRIN) 81 MG TBEC Take 81 mg by mouth every day.         Allergies  Allergies   Allergen Reactions   • Food      Spicy cinnamon--sores in mouth and tongue swelling       Vital Signs last 24 hours  Temp:  [36.2 °C (97.2 °F)] 36.2 °C (97.2 °F)  Pulse:  [72-78] 72  Resp:  [19-70] 21  BP: (165-223)/() 189/97  SpO2:  [94 %] 94 %    Physical Exam  Physical Exam  Vitals signs and nursing note reviewed.   Constitutional:       General: He is not in acute distress.     Appearance: Normal appearance. He is obese. He is not ill-appearing or toxic-appearing.   HENT:      Head: Normocephalic and atraumatic.      Nose: Nose normal.      Mouth/Throat:      Mouth: Mucous membranes are moist.      Pharynx: Oropharynx is clear.   Eyes:      Extraocular Movements: Extraocular movements intact.      Conjunctiva/sclera: Conjunctivae normal.      Pupils: Pupils are equal, round, and reactive to light.   Neck:      Musculoskeletal: Normal range of motion and neck supple.   Cardiovascular:      Rate and Rhythm: Normal rate and regular rhythm.      Pulses: Normal pulses.      Heart sounds: Normal heart sounds.   Pulmonary:      Effort: Pulmonary " effort is normal.      Breath sounds: Normal breath sounds.   Abdominal:      Palpations: Abdomen is soft.      Tenderness: There is no abdominal tenderness.   Musculoskeletal: Normal range of motion.   Skin:     General: Skin is warm and dry.      Capillary Refill: Capillary refill takes less than 2 seconds.   Neurological:      Mental Status: He is alert and oriented to person, place, and time.      Sensory: No sensory deficit.      Comments: L lower extremity 4/5  L upper extremity 4/5    R lower extremity 5/5  R upper extremity 5/5   Psychiatric:         Mood and Affect: Mood normal.         Behavior: Behavior normal.         Fluids  No intake or output data in the 24 hours ending 11/09/20 0910    Laboratory  Recent Results (from the past 48 hour(s))   CBC WITH DIFFERENTIAL    Collection Time: 11/09/20  8:01 AM   Result Value Ref Range    WBC 5.9 4.8 - 10.8 K/uL    RBC 5.67 4.70 - 6.10 M/uL    Hemoglobin 15.0 14.0 - 18.0 g/dL    Hematocrit 45.6 42.0 - 52.0 %    MCV 80.4 (L) 81.4 - 97.8 fL    MCH 26.5 (L) 27.0 - 33.0 pg    MCHC 32.9 (L) 33.7 - 35.3 g/dL    RDW 44.3 35.9 - 50.0 fL    Platelet Count 185 164 - 446 K/uL    MPV 10.0 9.0 - 12.9 fL    Neutrophils-Polys 62.10 44.00 - 72.00 %    Lymphocytes 26.90 22.00 - 41.00 %    Monocytes 7.80 0.00 - 13.40 %    Eosinophils 2.40 0.00 - 6.90 %    Basophils 0.50 0.00 - 1.80 %    Immature Granulocytes 0.30 0.00 - 0.90 %    Nucleated RBC 0.00 /100 WBC    Neutrophils (Absolute) 3.68 1.82 - 7.42 K/uL    Lymphs (Absolute) 1.59 1.00 - 4.80 K/uL    Monos (Absolute) 0.46 0.00 - 0.85 K/uL    Eos (Absolute) 0.14 0.00 - 0.51 K/uL    Baso (Absolute) 0.03 0.00 - 0.12 K/uL    Immature Granulocytes (abs) 0.02 0.00 - 0.11 K/uL    NRBC (Absolute) 0.00 K/uL   COMP METABOLIC PANEL    Collection Time: 11/09/20  8:01 AM   Result Value Ref Range    Sodium 135 135 - 145 mmol/L    Potassium 4.0 3.6 - 5.5 mmol/L    Chloride 100 96 - 112 mmol/L    Co2 24 20 - 33 mmol/L    Anion Gap 11.0 7.0 - 16.0     Glucose 222 (H) 65 - 99 mg/dL    Bun 15 8 - 22 mg/dL    Creatinine 0.75 0.50 - 1.40 mg/dL    Calcium 8.9 8.5 - 10.5 mg/dL    AST(SGOT) 17 12 - 45 U/L    ALT(SGPT) 27 2 - 50 U/L    Alkaline Phosphatase 91 30 - 99 U/L    Total Bilirubin 0.5 0.1 - 1.5 mg/dL    Albumin 4.1 3.2 - 4.9 g/dL    Total Protein 6.7 6.0 - 8.2 g/dL    Globulin 2.6 1.9 - 3.5 g/dL    A-G Ratio 1.6 g/dL   PROTHROMBIN TIME    Collection Time: 11/09/20  8:01 AM   Result Value Ref Range    PT 13.1 12.0 - 14.6 sec    INR 0.96 0.87 - 1.13   APTT    Collection Time: 11/09/20  8:01 AM   Result Value Ref Range    APTT 25.2 24.7 - 36.0 sec   TROPONIN    Collection Time: 11/09/20  8:01 AM   Result Value Ref Range    Troponin T 27 (H) 6 - 19 ng/L   ESTIMATED GFR    Collection Time: 11/09/20  8:01 AM   Result Value Ref Range    GFR If African American >60 >60 mL/min/1.73 m 2    GFR If Non African American >60 >60 mL/min/1.73 m 2   Routine (COVID/SARS COV-2 In-House PCR up to 24 hours)    Collection Time: 11/09/20  8:54 AM    Specimen: Nasopharyngeal; Respirate   Result Value Ref Range    COVID Order Status Received        Imaging  DX-CHEST-PORTABLE (1 VIEW)   Final Result      1.  There is no acute cardiopulmonary process.      CT-CTA NECK WITH & W/O-POST PROCESSING   Final Result      1.  CT angiogram of the neck demonstrating no significant carotid artery stenosis or occlusion.   2.  There is a mildly enlarged heterogeneous thyroid gland with multiple thyroid nodules which could be further assessed on a nonemergent basis with thyroid ultrasound.      CT-CEREBRAL PERFUSION ANALYSIS   Final Result      1.  Cerebral blood flow less than 30% likely representing completed infarct = 11 mL.      2.  T Max more than 6 seconds likely representing combination of completed infarct and ischemia = 23 mL.      3.  Mismatched volume likely representing ischemic brain/penumbra = 12 mL      4.  Please note that the cerebral perfusion was performed on the limited brain  tissue around the basal ganglia region. Infarct/ischemia outside the CT perfusion sections can be missed in this study.      CT-CTA HEAD WITH & W/O-POST PROCESS   Final Result      1.  CT angiogram of the [place] demonstrate no significant arterial stenosis or proximal occlusion.      CT-HEAD W/O   Final Result      1.  There is a focal area of increased attenuation in the right man most consistent with a small area of hemorrhage.   2.  There is an old infarct in the left posterior medial occipital lobe.      MR-BRAIN-W/O    (Results Pending)   EC-ECHOCARDIOGRAM COMPLETE W/O CONT    (Results Pending)       Assessment/Plan  * Acute hemorrhagic CVA (Pontine hemorrhage) ([facility])- (present on admission)  Assessment & Plan  - Hypertensive bleed  - BP control w/ nicardipine  - Hold all anticoagulation  - MRI brain pending  - Q 1 hr neuro checks  - Neurology consultation  - PT/OT    Hypertensive emergency- (present on admission)  Assessment & Plan  - Titrate nicardipine for SBP < 160 mm Hg  - Nicardipine currently at 5 mg/hr  - PRN labetelol  - Resume home antihypertensives    Elevated troponin- (present on admission)  Assessment & Plan  - Troponin T 27  - Likely from hypertensive emergency, hemorrhagic CVA  - Trend troponins  - TTE  - Telemetry  - No anticoagulation 2/2 hemorrhage    ICU Best Practice  Mobility: PT/OT - activity as tolerated  DVT prophylaxis - no chemical 2/2 bleed; place SCDs  Nutrition - NPO, dysphagia screen, check magnesium and phosphorus  Glycemic control - SSI and home Levemir  Invasive lines - none  Goals of care - full code  Dispo - Admit to RICU    Discussed patient condition and risk of morbidity and/or mortality with Family, RN, Patient and neurology.    The patient remains critically ill.  Critical care time = 30 minutes in directly providing and coordinating critical care and extensive data review.  No time overlap and excludes procedures.

## 2020-11-09 NOTE — ED PROVIDER NOTES
"ED Provider Note    CHIEF COMPLAINT  Chief Complaint   Patient presents with   • Weakness     left sided   • Headache       HPI  Joey Dawson is a 55 y.o. male who presents to the emergency department by ambulance for sudden onset right-sided headache, left-sided weakness and some slurred speech.  Patient states he had a sleep study done last night, was released this morning, stop by [employer] for shower before going to work.  At 650 a.m., right after the shower he had sudden onset symptoms.  Sat down the symptoms did not dissipate.  Therefore called EMS.  No visual changes.  Mild right-sided headache, although patient feels nauseous, no vomiting.  No syncope.  No chest pain or shortness of breath.    History of a stroke years ago.  Compliant with aspirin as well as his medications for hypertension, hyperlipidemia and diabetes.    REVIEW OF SYSTEMS  See HPI for further details. All other systems are negative.     PAST MEDICAL HISTORY   has a past medical history of Hyperlipidemia, Hypertension, Stroke (HCC), and Type II or unspecified type diabetes mellitus without mention of complication, not stated as uncontrolled.    SOCIAL HISTORY  Social History     Tobacco Use   • Smoking status: Never Smoker   • Smokeless tobacco: Never Used   Substance and Sexual Activity   • Alcohol use: Yes     Alcohol/week: 1.2 oz     Types: 2 Cans of beer per week     Comment: yearly   • Drug use: No   • Sexual activity: Yes       SURGICAL HISTORY  patient denies any surgical history    CURRENT MEDICATIONS  Home Medications     Reviewed by Micki Soni R.N. (Registered Nurse) on 11/09/20 at 1247  Med List Status: Complete   Medication Last Dose Status   aspirin EC (ECOTRIN) 81 MG TBEC 11/8/2020 Active   Dulaglutide 0.75 MG/0.5ML Solution Pen-injector 11/6/2020 Active   insulin detemir (LEVEMIR) 100 UNIT/ML injection PEN 11/8/2020 Active   Insulin Pen Needle (PEN NEEDLES 5/16\") 30G X 8 MM Misc  Active   lisinopril (PRINIVIL) 40 " "MG tablet 11/8/2020 Active   metFORMIN ER (GLUCOPHAGE XR) 500 MG TABLET SR 24 HR 11/8/2020 Active   naproxen (NAPROSYN) 500 MG Tab 11/8/2020 Active   pravastatin (PRAVACHOL) 40 MG tablet 11/8/2020 Active   sertraline (ZOLOFT) 50 MG Tab 11/8/2020 Active                ALLERGIES  Allergies   Allergen Reactions   • Food      Spicy cinnamon--sores in mouth and tongue swelling       PHYSICAL EXAM  VITAL SIGNS: BP (!) 161/85   Pulse 76   Temp 36.2 °C (97.2 °F) (Temporal)   Resp (!) 22   Ht 1.727 m (5' 8\")   Wt 115.6 kg (254 lb 13.6 oz)   SpO2 95%   BMI 38.75 kg/m²   Pulse ox interpretation: I interpret this pulse ox as normal.  Constitutional: Alert in no apparent distress.  HENT: Normocephalic, atraumatic. Bilateral external ears normal, Nose normal. Moist mucous membranes.    Eyes: Pupils are equal and reactive, 3-2 bilaterally.  Conjunctiva normal.  EOMI, no nystagmus.  Neck: Normal range of motion, Supple.  No meningeal irritation.  Lymphatic: No lymphadenopathy noted.   Cardiovascular: Regular rate and rhythm, no murmurs. Distal pulses intact.  No peripheral edema.  Thorax & Lungs: Normal breath sounds.  No wheezing/rales/ronchi. No increased work of breathing, clipped speech or retractions.   Abdomen: Obese, soft, non-distended, non-tender to palpation. No palpable or pulsatile masses. No peritoneal signs  Skin: Warm, Dry, No erythema, No rash.   Musculoskeletal: Good range of motion in all major joints. No major deformities noted.   Neurologic: Alert and oriented x4.  Mild dysarthria but when speaks slow words are clear.  Cranial nerves II through XII intact bilaterally.  5 out of 5 strength in 4 extremities although left, upper extremity greater than left lower extremity is weaker compared to the right.  He has some ataxia with finger-nose testing on the left.  Some drift with straight leg raise on the left.  Psychiatric: Affect normal, Judgment normal, Mood normal.       DIAGNOSTIC STUDIES / " PROCEDURES      LABS  Results for orders placed or performed during the hospital encounter of 11/09/20   CBC WITH DIFFERENTIAL   Result Value Ref Range    WBC 5.9 4.8 - 10.8 K/uL    RBC 5.67 4.70 - 6.10 M/uL    Hemoglobin 15.0 14.0 - 18.0 g/dL    Hematocrit 45.6 42.0 - 52.0 %    MCV 80.4 (L) 81.4 - 97.8 fL    MCH 26.5 (L) 27.0 - 33.0 pg    MCHC 32.9 (L) 33.7 - 35.3 g/dL    RDW 44.3 35.9 - 50.0 fL    Platelet Count 185 164 - 446 K/uL    MPV 10.0 9.0 - 12.9 fL    Neutrophils-Polys 62.10 44.00 - 72.00 %    Lymphocytes 26.90 22.00 - 41.00 %    Monocytes 7.80 0.00 - 13.40 %    Eosinophils 2.40 0.00 - 6.90 %    Basophils 0.50 0.00 - 1.80 %    Immature Granulocytes 0.30 0.00 - 0.90 %    Nucleated RBC 0.00 /100 WBC    Neutrophils (Absolute) 3.68 1.82 - 7.42 K/uL    Lymphs (Absolute) 1.59 1.00 - 4.80 K/uL    Monos (Absolute) 0.46 0.00 - 0.85 K/uL    Eos (Absolute) 0.14 0.00 - 0.51 K/uL    Baso (Absolute) 0.03 0.00 - 0.12 K/uL    Immature Granulocytes (abs) 0.02 0.00 - 0.11 K/uL    NRBC (Absolute) 0.00 K/uL   COMP METABOLIC PANEL   Result Value Ref Range    Sodium 135 135 - 145 mmol/L    Potassium 4.0 3.6 - 5.5 mmol/L    Chloride 100 96 - 112 mmol/L    Co2 24 20 - 33 mmol/L    Anion Gap 11.0 7.0 - 16.0    Glucose 222 (H) 65 - 99 mg/dL    Bun 15 8 - 22 mg/dL    Creatinine 0.75 0.50 - 1.40 mg/dL    Calcium 8.9 8.5 - 10.5 mg/dL    AST(SGOT) 17 12 - 45 U/L    ALT(SGPT) 27 2 - 50 U/L    Alkaline Phosphatase 91 30 - 99 U/L    Total Bilirubin 0.5 0.1 - 1.5 mg/dL    Albumin 4.1 3.2 - 4.9 g/dL    Total Protein 6.7 6.0 - 8.2 g/dL    Globulin 2.6 1.9 - 3.5 g/dL    A-G Ratio 1.6 g/dL   PROTHROMBIN TIME   Result Value Ref Range    PT 13.1 12.0 - 14.6 sec    INR 0.96 0.87 - 1.13   APTT   Result Value Ref Range    APTT 25.2 24.7 - 36.0 sec   COD (ADULT)   Result Value Ref Range    ABO Grouping Only A     Rh Grouping Only POS     Antibody Screen-Cod NEG    TROPONIN   Result Value Ref Range    Troponin T 27 (H) 6 - 19 ng/L   ESTIMATED GFR    Result Value Ref Range    GFR If African American >60 >60 mL/min/1.73 m 2    GFR If Non African American >60 >60 mL/min/1.73 m 2   Routine (COVID/SARS COV-2 In-House PCR up to 24 hours)    Specimen: Nasopharyngeal; Respirate   Result Value Ref Range    COVID Order Status Received    Lipid Profile - Fasting   Result Value Ref Range    Cholesterol,Tot 126 100 - 199 mg/dL    Triglycerides 82 0 - 149 mg/dL    HDL 38 (A) >=40 mg/dL    LDL 72 <100 mg/dL   Sodium Serum (NA)   Result Value Ref Range    Sodium 134 (L) 135 - 145 mmol/L   Platelet Mapping with Basic TEG   Result Value Ref Range    Reaction Time Initial-R 6.1 5.0 - 10.0 min    Clot Kinetics-K 2.6 1.0 - 3.0 min    Clot Angle-Angle 57.5 53.0 - 72.0 degrees    Maximum Clot Strength-MA 60.5 50.0 - 70.0 mm    Lysis 30 minutes-LY30 0.0 0.0 - 8.0 %    % Inhibition ADP 0.0 %    % Inhibition AA 0.0 %    TEG Algorithm Link Algorithm    Magnesium   Result Value Ref Range    Magnesium 1.7 1.5 - 2.5 mg/dL   HEMOGLOBIN A1C   Result Value Ref Range    Glycohemoglobin 10.7 (H) 0.0 - 5.6 %    Est Avg Glucose 260 mg/dL   SARS-CoV-2, PCR (In-House)   Result Value Ref Range    SARS-CoV-2 Source NP Swab    TROPONIN   Result Value Ref Range    Troponin T 27 (H) 6 - 19 ng/L   PHOSPHORUS   Result Value Ref Range    Phosphorus 2.4 (L) 2.5 - 4.5 mg/dL   ABO Rh Confirm   Result Value Ref Range    ABO Rh Confirm A POS    EKG (NOW)   Result Value Ref Range    Report       [facility] Emergency Dept.    Test Date:  2020  Pt Name:    RASHMI MOON              Department: ER  MRN:        [id number]                      Room:       R105  Gender:     Male                         Technician: [id number]  :        1965                   Requested By:ELVA PIPER  Order #:    [id number]                    Tameka MD: ELVA PIPER, DO    Measurements  Intervals                                Axis  Rate:       77                           P:          42  OK:          180                          QRS:        -74  QRSD:       148                          T:          113  QT:         440  QTc:        499    Interpretive Statements  SINUS RHYTHM  RIGHT BUNDLE BRANCH BLOCK  LVH WITH IVCD AND SECONDARY REPOL ABNRM  No previous ECG available for comparison  Electronically Signed On 11-9-2020 12:50:20 PST by ELVA PIPER DO           RADIOLOGY  DX-CHEST-PORTABLE (1 VIEW)   Final Result      1.  There is no acute cardiopulmonary process.      CT-CTA NECK WITH & W/O-POST PROCESSING   Final Result      1.  CT angiogram of the neck demonstrating no significant carotid artery stenosis or occlusion.   2.  There is a mildly enlarged heterogeneous thyroid gland with multiple thyroid nodules which could be further assessed on a nonemergent basis with thyroid ultrasound.      CT-CEREBRAL PERFUSION ANALYSIS   Final Result      1.  Cerebral blood flow less than 30% likely representing completed infarct = 11 mL.      2.  T Max more than 6 seconds likely representing combination of completed infarct and ischemia = 23 mL.      3.  Mismatched volume likely representing ischemic brain/penumbra = 12 mL      4.  Please note that the cerebral perfusion was performed on the limited brain tissue around the basal ganglia region. Infarct/ischemia outside the CT perfusion sections can be missed in this study.      CT-CTA HEAD WITH & W/O-POST PROCESS   Final Result      1.  CT angiogram of the [place] demonstrate no significant arterial stenosis or proximal occlusion.      CT-HEAD W/O   Final Result      1.  There is a focal area of increased attenuation in the right man most consistent with a small area of hemorrhage.   2.  There is an old infarct in the left posterior medial occipital lobe.      MR-BRAIN-W/O    (Results Pending)   EC-ECHOCARDIOGRAM COMPLETE W/O CONT    (Results Pending)           COURSE & MEDICAL DECISION MAKING  Nursing notes and vital signs were reviewed. (See chart for  details)  The patients  records were reviewed, history was obtained from the patient;    0755 -patient was seen and evaluated bedside.  Code stroke initiated due to sudden onset headache, dysarthria, left-sided weakness at 0650.  He does have measurable deficit, NIH approximately 4.  He is hypertensive, systolic greater than 200, compliant with home medications but on lisinopril only which she takes in the evenings.      0800 -discussion with pharmacy.  Patient is hypertensive, systolic most 220/120, will add dose of labetalol.    0805 - Neurology paged.    8:15 AM Dr. Frausto is aware of the patient agreeable consultation.  He is at bedside in CT now.    8:28 AM patient is returned from CAT scan.  Neurology at bedside.  There is gross evidence for small pontine hemorrhage.  Suspect secondary to hypertension an additional dose of a beta-blocker was given in CT.  Will add nicardipine drip now as well.  Exam is otherwise unchanged.  He is mentating appropriately.  Airway intact.  Plan ICU admission for medical management.    8:35 AM hospitalist, ICU paged.    8:40 AM Dr. López is aware of the patient agreeable to consultation.  Also patient reevaluated bedside, symptomatology unchanged.  Systolic blood pressure 160/85 after second dose of labetalol.  Awaiting Cardene.    0905 - Dr. Gonda is aware of the patient agreeable to consultation.    The total critical care time on this patient is 40 minutes, immediate and continuous hemodynamic monitoring and multiple bedside evaluations, resuscitating patient and assessing response to treatment, deciphering test results, speaking with admitting and consulting physician, and arranging for ICU hospital admission. This 40 minutes is exclusive of separately billable procedures.      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 The accuracy of the dictation is limited to the abilities of the software. I expect there may be some errors of grammar and possibly content. The nursing notes were reviewed and certain aspects of this information were incorporated into this note.

## 2020-11-09 NOTE — H&P
Hospital Medicine History & Physical Note    Date of Service  11/9/2020    Primary Care Physician  LEE Purvis.    Consultants  Neurology  Critical care    Code Status  Full Code    Chief Complaint  Chief Complaint   Patient presents with   • Weakness     left sided   • Headache       History of Presenting Illness  55 y.o. male with type 2 diabetes mellitus, hypertension, hyperlipidemia, obstructive sleep apnea, who was doing well until this morning after being released from his sleep study, keep up a shower, and shortly after at around 6:50 AM when had sudden onset weakness on the left arm and leg, along with slurred speech.  He then developed pressure headache on the right side, which he rated 5 out of 10 in severity at peak, before relenting.  He denied any visual field cuts, vertigo, focal numbness, or any other symptoms.  Patient was then brought to the ED.    Notably, he denied any other symptoms.  He had no fevers, chills, cough, chest pain, shortness of breath, abdominal pain, bowel movement changes, or urinary symptoms prior to the onset of above.  He states he is compliant with his medications particularly his antihypertensives and diabetes regimen.  He denies any smoking, alcohol use, or drug use.    ED course:  Patient was initially evaluated.  He was significantly hypertensive at 123/111.  Initial blood work-up was unimpressive except for troponin of 27, and glucose of 222.  Head CT showed small area of hemorrhage in the right man.  CT angiogram of the head showed normal [place], and CT angiogram of the neck is normal.  Patient was given IV labetalol, and started on nicardipine drip which improved his blood pressure into the 160s.  His symptoms have improved.  Neurology and critical care were consulted.  He was subsequently admitted to the hospitalist service.      Review of Systems  ROS     Pertinent positives/negatives as mentioned above.     A complete review of systems was  "personally done by me. All other systems were negative.       Past Medical History   has a past medical history of Hyperlipidemia, Hypertension, Stroke (HCC), and Type II or unspecified type diabetes mellitus without mention of complication, not stated as uncontrolled.    Surgical History  Reviewed.  No past surgical history    Family History  family history includes Cancer in his mother; Heart Disease in his father.     Social History   reports that he has never smoked. He has never used smokeless tobacco. He reports current alcohol use of about 1.2 oz of alcohol per week. He reports that he does not use drugs.    Allergies  Allergies   Allergen Reactions   • Food      Spicy cinnamon--sores in mouth and tongue swelling       Medications  Prior to Admission Medications   Prescriptions Last Dose Informant Patient Reported? Taking?   Dulaglutide 0.75 MG/0.5ML Solution Pen-injector 2020 at Q7D Patient No No   Sig: Inject 0.5 mL as instructed every 7 days.   Insulin Pen Needle (PEN NEEDLES 5/16\") 30G X 8 MM Misc  Patient No No   Si Unknown by Does not apply route every day. To be used with insulin pen   aspirin EC (ECOTRIN) 81 MG TBEC 2020 at PM Patient Yes No   Sig: Take 81 mg by mouth every day.   insulin detemir (LEVEMIR) 100 UNIT/ML injection PEN 2020 at PM Patient No No   Sig: Inject 15 Units as instructed every evening.   lisinopril (PRINIVIL) 40 MG tablet 2020 at PM Patient No No   Sig: Take 1 Tab by mouth every day.   metFORMIN ER (GLUCOPHAGE XR) 500 MG TABLET SR 24 HR 2020 at PM Patient No No   Sig: Take 2 Tabs by mouth 2 times a day.   Patient taking differently: Take 1,000 mg by mouth 2 times a day. 2 tablets = 1000 mg   naproxen (NAPROSYN) 500 MG Tab 2020 at PM Patient No No   Sig: Take 1 Tab by mouth 2 times a day, with meals.   pravastatin (PRAVACHOL) 40 MG tablet 2020 at PM Patient No No   Sig: Take 1 Tab by mouth every day.   Patient taking differently: Take 40 mg " by mouth every evening.   sertraline (ZOLOFT) 50 MG Tab 11/8/2020 at PM Patient No No   Sig: Take 1 Tab by mouth every day.   Patient taking differently: Take 50 mg by mouth every evening.      Facility-Administered Medications: None       Physical Exam  Temp:  [36.2 °C (97.2 °F)] 36.2 °C (97.2 °F)  Pulse:  [72-78] 72  Resp:  [19-70] 21  BP: (165-223)/() 189/97  SpO2:  [94 %] 94 %    Physical Exam  Vitals signs reviewed.   Constitutional:       General: He is not in acute distress.     Appearance: Normal appearance. He is obese. He is not toxic-appearing or diaphoretic.      Comments: Body mass index is 40.29 kg/m².   HENT:      Head: Normocephalic and atraumatic.      Right Ear: External ear normal.      Left Ear: External ear normal.      Mouth/Throat:      Mouth: Mucous membranes are moist.      Pharynx: No oropharyngeal exudate.   Eyes:      General: No scleral icterus.     Extraocular Movements: Extraocular movements intact.      Conjunctiva/sclera: Conjunctivae normal.      Pupils: Pupils are equal, round, and reactive to light.   Neck:      Musculoskeletal: Normal range of motion and neck supple.   Cardiovascular:      Rate and Rhythm: Normal rate and regular rhythm.      Heart sounds: Normal heart sounds. No murmur. No gallop.    Pulmonary:      Effort: Pulmonary effort is normal. No respiratory distress.      Breath sounds: Normal breath sounds. No stridor. No wheezing, rhonchi or rales.   Chest:      Chest wall: No tenderness.   Abdominal:      General: Bowel sounds are normal. There is no distension.      Palpations: Abdomen is soft. There is no mass.      Tenderness: There is no abdominal tenderness. There is no guarding or rebound.   Musculoskeletal: Normal range of motion.         General: No swelling.   Lymphadenopathy:      Cervical: No cervical adenopathy.   Skin:     General: Skin is warm and dry.      Coloration: Skin is not jaundiced.      Findings: No rash.   Neurological:      General: No  focal deficit present.      Mental Status: He is alert and oriented to person, place, and time.      Cranial Nerves: No cranial nerve deficit.      Comments: Noticeable weakness on the left upper extremity, 4 out of 5.  5 out of 5 on the other 3 extremities.  No pronator drift.  No facial droop.  No possible difficulty doing finger-to-nose test on the left upper extremity.  No gross sensory deficits x4 extremities   Psychiatric:         Mood and Affect: Mood normal.         Behavior: Behavior normal.         Thought Content: Thought content normal.         Judgment: Judgment normal.         Laboratory:  Recent Labs     11/09/20  0801   WBC 5.9   RBC 5.67   HEMOGLOBIN 15.0   HEMATOCRIT 45.6   MCV 80.4*   MCH 26.5*   MCHC 32.9*   RDW 44.3   PLATELETCT 185   MPV 10.0     Recent Labs     11/09/20 0801   SODIUM 135   POTASSIUM 4.0   CHLORIDE 100   CO2 24   GLUCOSE 222*   BUN 15   CREATININE 0.75   CALCIUM 8.9     Recent Labs     11/09/20 0801   ALTSGPT 27   ASTSGOT 17   ALKPHOSPHAT 91   TBILIRUBIN 0.5   GLUCOSE 222*     Recent Labs     11/09/20 0801   APTT 25.2   INR 0.96     No results for input(s): NTPROBNP in the last 72 hours.      Recent Labs     11/09/20 0801   TROPONINT 27*       Imaging:  DX-CHEST-PORTABLE (1 VIEW)   Final Result      1.  There is no acute cardiopulmonary process.      CT-CTA NECK WITH & W/O-POST PROCESSING   Final Result      1.  CT angiogram of the neck demonstrating no significant carotid artery stenosis or occlusion.   2.  There is a mildly enlarged heterogeneous thyroid gland with multiple thyroid nodules which could be further assessed on a nonemergent basis with thyroid ultrasound.      CT-CEREBRAL PERFUSION ANALYSIS   Final Result      1.  Cerebral blood flow less than 30% likely representing completed infarct = 11 mL.      2.  T Max more than 6 seconds likely representing combination of completed infarct and ischemia = 23 mL.      3.  Mismatched volume likely representing ischemic  brain/penumbra = 12 mL      4.  Please note that the cerebral perfusion was performed on the limited brain tissue around the basal ganglia region. Infarct/ischemia outside the CT perfusion sections can be missed in this study.      CT-CTA HEAD WITH & W/O-POST PROCESS   Final Result      1.  CT angiogram of the [place] demonstrate no significant arterial stenosis or proximal occlusion.      CT-HEAD W/O   Final Result      1.  There is a focal area of increased attenuation in the right man most consistent with a small area of hemorrhage.   2.  There is an old infarct in the left posterior medial occipital lobe.      MR-BRAIN-W/O    (Results Pending)   EC-ECHOCARDIOGRAM COMPLETE W/O CONT    (Results Pending)         Assessment/Plan:  I anticipate this patient will require at least two midnights for appropriate medical management, necessitating inpatient admission.    * Acute hemorrhagic CVA (Pontine hemorrhage) (HCC)- (present on admission)  Assessment & Plan  -Suspect hypertensive bleed.  Presenting with left-sided weakness, and slurred speech, along with headache, occasionally symptoms improved since presentation.  -Blood pressure control with nicardipine drip, along with as needed IV labetalol.  Goal of normotension.  -Hold aspirin.  Continue statin. Check lipid profile and hemoglobin A1c for further risk stratification.  -Monitor sodium level, aim to keep levels within normal range.  Gentle IV fluids with NS at 80 cc/h  -will obtain MRI of the brain to evaluate extent of disease. Will also get echocardiogram for completion.  Monitor on telemetry to rule out occult arrhythmia/A. fib.  -Monitor closely, with neuro checks every 4 hours per CVA protocol.  -Neurology has been consulted  -will get PT/OT/SLP to evaluate.     Hypertensive emergency- (present on admission)  Assessment & Plan  -Hypertensive pontine hemorrhage/CVA.  -Continue nicardipine drip, so for controlling blood pressure better.  Will also be  on as needed IV labetalol, and oral clonidine for significant hypertension.  Resume lisinopril.    Elevated troponin- (present on admission)  Assessment & Plan  -Likely stress related from hypertensive emergency, hemorrhagic CVA.  No chest pain.  No signs of ACS.    -Trend troponins.  Check echocardiogram.  Monitor on telemetry.    Obesity (BMI 30-39.9)- (present on admission)  Assessment & Plan  - Body mass index is 40.29 kg/m²..  - Counseled on weight loss.  - outpatient referral for outpatient weight management.    Mixed hyperlipidemia- (present on admission)  Assessment & Plan  -Resume statin.  Check lipid profile.    Sleep apnea- (present on admission)  Assessment & Plan  - s/p polysomnogram last night.    DM2 (diabetes mellitus, type 2) (HCC)- (present on admission)  Assessment & Plan  -Resume home Levemir.  Hold oral hypoglycemic agents.  I will place him on sliding scale insulin coverage while in-house.  Check hemoglobin A1c. Accu-Cheks before meals and at bedtime. Goal to keep BG between 140-180 per 2019 ADA guidelines.        VTE prophylaxis: SCD

## 2020-11-09 NOTE — CONSULTS
Chief Complaint   Patient presents with   • Weakness     left sided   • Headache       Problem List Items Addressed This Visit     * (Principal) Acute hemorrhagic CVA (Pontine hemorrhage) (HCC)     - Hypertensive bleed  - BP control w/ nicardipine  - Hold all anticoagulation  - MRI brain pending  - Q 1 hr neuro checks  - Neurology consultation  - PT/OT         Relevant Orders    REFERRAL TO PHYSIATRY (PMR)    REFERRAL TO NEUROLOGY      Other Visit Diagnoses     Uncontrolled hypertension        Relevant Medications    labetalol (NORMODYNE/TRANDATE) injection 10 mg (Completed)    niCARdipine (CARDENE) 25 mg in  mL Infusion    labetalol (NORMODYNE/TRANDATE) injection 10 mg (Completed)    lisinopril (PRINIVIL) tablet 40 mg (Start on 11/9/2020  6:00 PM)    pravastatin (PRAVACHOL) tablet 40 mg (Start on 11/9/2020  6:00 PM)    cloNIDine (CATAPRES) tablet 0.1 mg    labetalol (NORMODYNE/TRANDATE) injection 10 mg    Left-sided weakness        Ataxia            Neurology Stroke Alert Consultation     History of present illness:  This is a 55-year old male with PMhx significant for stroke (2014; Left PCA territory; no residual deficits), hypertension, dyslipidemia, type II DM who presented to [facility] on 11/9/20 for a chief complaint of mild left sided weakness, slurred speech and Right sided headache. The patient states that last night, he had a sleep study; he awoke this morning feeling well, around 0630. At 0650, he was taking a shower when he noted sudden onset of the above noted symptoms. EMS was called; on scene, ; BG WNL. At time of presentation here, NIHSS 3-4; notably had mild ataxia per exam as well. STAT CT head revealed tiny acute hemorrhage involving right/central man; likely hypertensive etiology given hypertensive crisis on arrival. ICH score 1.   Currently, patient is sitting up in stretcher; awake and alert. Still with very mild slurred speech, LUE/LLE weakness and mild ataxia/dysmetria.  He admits to mild persistent Right sided headache as well. He denies dizziness, numbness, paresthesia, problem with vision or problem swallowing.     Neurology has been consulted by Dr. Yessi Brunson to further evaluate the findings noted above.      Past medical history:   Past Medical History:   Diagnosis Date   • Hyperlipidemia    • Hypertension    • Stroke (HCC)    • Type II or unspecified type diabetes mellitus without mention of complication, not stated as uncontrolled        Past surgical history:   History reviewed. No pertinent surgical history.    Family history:   Family History   Problem Relation Age of Onset   • Cancer Mother         Breast cancer   • Heart Disease Father         CHF       Social history:   Social History     Socioeconomic History   • Marital status: Single     Spouse name: Not on file   • Number of children: Not on file   • Years of education: Not on file   • Highest education level: Not on file   Occupational History   • Not on file   Social Needs   • Financial resource strain: Not on file   • Food insecurity     Worry: Not on file     Inability: Not on file   • Transportation needs     Medical: Not on file     Non-medical: Not on file   Tobacco Use   • Smoking status: Never Smoker   • Smokeless tobacco: Never Used   Substance and Sexual Activity   • Alcohol use: Yes     Alcohol/week: 1.2 oz     Types: 2 Cans of beer per week     Comment: yearly   • Drug use: No   • Sexual activity: Yes   Lifestyle   • Physical activity     Days per week: Not on file     Minutes per session: Not on file   • Stress: Not on file   Relationships   • Social connections     Talks on phone: Not on file     Gets together: Not on file     Attends Christian service: Not on file     Active member of club or organization: Not on file     Attends meetings of clubs or organizations: Not on file     Relationship status: Not on file   • Intimate partner violence     Fear of current or ex partner: Not on file      Emotionally abused: Not on file     Physically abused: Not on file     Forced sexual activity: Not on file   Other Topics Concern   • Not on file   Social History Narrative   • Not on file       Current medications:   Current Facility-Administered Medications   Medication Dose   • niCARdipine (CARDENE) 25 mg in  mL Infusion  0-15 mg/hr   • insulin glargine (Lantus) injection  15 Units   • lisinopril (PRINIVIL) tablet 40 mg  40 mg   • pravastatin (PRAVACHOL) tablet 40 mg  40 mg   • sertraline (Zoloft) tablet 50 mg  50 mg   • Respiratory Therapy Consult     • NS infusion     • ondansetron (ZOFRAN ODT) dispertab 4 mg  4 mg   • acetaminophen (Tylenol) tablet 650 mg  650 mg    Or   • acetaminophen (TYLENOL) suppository 650 mg  650 mg   • LORazepam (ATIVAN) injection 2 mg  2 mg   • MD Alert...ICU Electrolyte Replacement per Pharmacy     • cloNIDine (CATAPRES) tablet 0.1 mg  0.1 mg   • labetalol (NORMODYNE/TRANDATE) injection 10 mg  10 mg   • ondansetron (ZOFRAN) syringe/vial injection 4 mg  4 mg   • promethazine (PHENERGAN) tablet 12.5-25 mg  12.5-25 mg   • promethazine (PHENERGAN) suppository 12.5-25 mg  12.5-25 mg   • prochlorperazine (COMPAZINE) injection 5-10 mg  5-10 mg   • senna-docusate (PERICOLACE or SENOKOT S) 8.6-50 MG per tablet 2 Tab  2 Tab    And   • polyethylene glycol/lytes (MIRALAX) PACKET 1 Packet  1 Packet    And   • magnesium hydroxide (MILK OF MAGNESIA) suspension 30 mL  30 mL    And   • bisacodyl (DULCOLAX) suppository 10 mg  10 mg   • insulin regular (HumuLIN R,NovoLIN R) injection  2-9 Units    And   • glucose 4 g chewable tablet 16 g  16 g    And   • dextrose 50% (D50W) injection 50 mL  50 mL       Medication Allergy:  Allergies   Allergen Reactions   • Food      Spicy cinnamon--sores in mouth and tongue swelling       Review of systems:   Constitutional: denies fever, night sweats, weight loss.   Eyes: denies acute vision change, eye pain or secretion.   Ears, Nose, Mouth, Throat: denies  nasal secretion, nasal bleeding, difficulty swallowing, hearing loss, tinnitus, vertigo, ear pain, acute dental problems, oral ulcers or lesions.   Endocrine: denies recent weight changes, heat or cold intolerance, polyuria, polydypsia, polyphagia,abnormal hair growth.  Cardiovascular: denies new onset of chest pain, palpitations, syncope, or dyspnea of exertion.  Pulmonary: denies shortness of breath, new onset of cough, hemoptysis, wheezing, chest pain or flu-like symptoms.   GI: denies nausea, vomiting, diarrhea, GI bleeding, change in appetite, abdominal pain, and change in bowel habits.  : denies dysuria, urinary incontinence, hematuria.  Heme/oncology: denies history of easy bruising or bleeding. No history of cancer, DVTor PE.  Allergy/immunology: denies hives/urticaria, or itching.   Dermatologic: denies new rash, or new skin lesions.  Musculoskeletal:denies joint swelling or pain, muscle pain, neck and back pain.   Neurologic: As noted above.   Psychiatric: denies symptoms of depression, anxiety, hallucinations, mood swings or changes, suicidal or homicidal thoughts.     Physical examination:   Vitals:    11/09/20 1121 11/09/20 1131 11/09/20 1141 11/09/20 1215   BP: 148/82 154/84 137/73 (!) 161/85   Pulse: 70 71 72 76   Resp: (!) 37 17 17 (!) 22   Temp:       TempSrc:       SpO2: 93% 92% 92% 95%   Weight:       Height:         General: Patient in no acute distress, pleasant and cooperative.  HEENT: Normocephalic, no signs of acute trauma.   Neck: supple, no meningeal signs or carotid bruits. There is normal range of motion. No tenderness on exam.   Chest: clear to auscultation. No cough.   CV: RRR, no murmurs.   Skin: no signs of acute rashes or trauma.   Musculoskeletal: joints exhibit full range of motion, without any pain to palpation. There are no signs of joint or muscle swelling. There is no tenderness to deep palpation of muscles.   Psychiatric: No hallucinatory behavior.      NEUROLOGICAL EXAM:    Mental status, orientation: Awake, alert and fully oriented.   Speech and language: speech is clear and fluent. The patient is able to name, repeat and comprehend.   Memory: There is intact recollection of recent and remote events.   Cranial nerve exam: Pupils are 3-4 mm bilaterally and equally reactive to light and accommodation. Visual fields are intact by confrontation.There is no nystagmus on primary or secondary gaze. Intact full EOM in all directions of gaze. Face appears symmetric. Sensation in the face is intact to light touch. Uvula is midline. Palate elevates symmetrically. Tongue is midline and without any signs of tongue biting or fasciculations. Sternocleidomastoid muscles exhibit is normal strength bilaterally. Shoulder shrug is intact bilaterally.   Motor exam: Strength is 5/5 in RUE/RLE; 4+/5 to LUE/LLE. Tone is normal. No abnormal movements were seen on exam.   Sensory exam reveals normal sense of light touch and pinprick in all extremities.   Deep tendon reflexes:  2+ throughout. Plantar responses are flexor. There is no clonus.   Coordination: mild LUE dysmetria appreciated.    Gait: Not assessed at this time as patient is a fall risk.     ICH SCORE: 1    ANCILLARY DATA REVIEWED:     Lab Data Review:  Recent Results (from the past 24 hour(s))   CBC WITH DIFFERENTIAL    Collection Time: 11/09/20  8:01 AM   Result Value Ref Range    WBC 5.9 4.8 - 10.8 K/uL    RBC 5.67 4.70 - 6.10 M/uL    Hemoglobin 15.0 14.0 - 18.0 g/dL    Hematocrit 45.6 42.0 - 52.0 %    MCV 80.4 (L) 81.4 - 97.8 fL    MCH 26.5 (L) 27.0 - 33.0 pg    MCHC 32.9 (L) 33.7 - 35.3 g/dL    RDW 44.3 35.9 - 50.0 fL    Platelet Count 185 164 - 446 K/uL    MPV 10.0 9.0 - 12.9 fL    Neutrophils-Polys 62.10 44.00 - 72.00 %    Lymphocytes 26.90 22.00 - 41.00 %    Monocytes 7.80 0.00 - 13.40 %    Eosinophils 2.40 0.00 - 6.90 %    Basophils 0.50 0.00 - 1.80 %    Immature Granulocytes 0.30 0.00 - 0.90 %    Nucleated RBC 0.00 /100 WBC     Neutrophils (Absolute) 3.68 1.82 - 7.42 K/uL    Lymphs (Absolute) 1.59 1.00 - 4.80 K/uL    Monos (Absolute) 0.46 0.00 - 0.85 K/uL    Eos (Absolute) 0.14 0.00 - 0.51 K/uL    Baso (Absolute) 0.03 0.00 - 0.12 K/uL    Immature Granulocytes (abs) 0.02 0.00 - 0.11 K/uL    NRBC (Absolute) 0.00 K/uL   COMP METABOLIC PANEL    Collection Time: 20  8:01 AM   Result Value Ref Range    Sodium 135 135 - 145 mmol/L    Potassium 4.0 3.6 - 5.5 mmol/L    Chloride 100 96 - 112 mmol/L    Co2 24 20 - 33 mmol/L    Anion Gap 11.0 7.0 - 16.0    Glucose 222 (H) 65 - 99 mg/dL    Bun 15 8 - 22 mg/dL    Creatinine 0.75 0.50 - 1.40 mg/dL    Calcium 8.9 8.5 - 10.5 mg/dL    AST(SGOT) 17 12 - 45 U/L    ALT(SGPT) 27 2 - 50 U/L    Alkaline Phosphatase 91 30 - 99 U/L    Total Bilirubin 0.5 0.1 - 1.5 mg/dL    Albumin 4.1 3.2 - 4.9 g/dL    Total Protein 6.7 6.0 - 8.2 g/dL    Globulin 2.6 1.9 - 3.5 g/dL    A-G Ratio 1.6 g/dL   PROTHROMBIN TIME    Collection Time: 20  8:01 AM   Result Value Ref Range    PT 13.1 12.0 - 14.6 sec    INR 0.96 0.87 - 1.13   APTT    Collection Time: 20  8:01 AM   Result Value Ref Range    APTT 25.2 24.7 - 36.0 sec   COD (ADULT)    Collection Time: 20  8:01 AM   Result Value Ref Range    ABO Grouping Only A     Rh Grouping Only POS     Antibody Screen-Cod NEG    TROPONIN    Collection Time: 20  8:01 AM   Result Value Ref Range    Troponin T 27 (H) 6 - 19 ng/L   ESTIMATED GFR    Collection Time: 20  8:01 AM   Result Value Ref Range    GFR If African American >60 >60 mL/min/1.73 m 2    GFR If Non African American >60 >60 mL/min/1.73 m 2   EKG (NOW)    Collection Time: 20  8:39 AM   Result Value Ref Range    Report       [facility]    Test Date:  2020  Pt Name:    RASHMI MOON              Department: ER  MRN:        [id number]                      Room:       [facility]  Gender:     Male                         Technician: [id number]  :        1965                    Requested By:ELVA PIPER  Order #:    [id number]                    Reading MD:    Measurements  Intervals                                Axis  Rate:       77                           P:          42  IA:         180                          QRS:        -74  QRSD:       148                          T:          113  QT:         440  QTc:        499    Interpretive Statements  SINUS RHYTHM  RIGHT BUNDLE BRANCH BLOCK  LVH WITH IVCD AND SECONDARY REPOL ABNRM  No previous ECG available for comparison     Routine (COVID/SARS COV-2 In-House PCR up to 24 hours)    Collection Time: 11/09/20  8:54 AM    Specimen: Nasopharyngeal; Respirate   Result Value Ref Range    COVID Order Status Received    SARS-CoV-2, PCR (In-House)    Collection Time: 11/09/20  8:54 AM   Result Value Ref Range    SARS-CoV-2 Source NP Swab    Lipid Profile - Fasting    Collection Time: 11/09/20  9:39 AM   Result Value Ref Range    Cholesterol,Tot 126 100 - 199 mg/dL    Triglycerides 82 0 - 149 mg/dL    HDL 38 (A) >=40 mg/dL    LDL 72 <100 mg/dL   Sodium Serum (NA)    Collection Time: 11/09/20  9:39 AM   Result Value Ref Range    Sodium 134 (L) 135 - 145 mmol/L   Platelet Mapping with Basic TEG    Collection Time: 11/09/20  9:39 AM   Result Value Ref Range    Reaction Time Initial-R 6.1 5.0 - 10.0 min    Clot Kinetics-K 2.6 1.0 - 3.0 min    Clot Angle-Angle 57.5 53.0 - 72.0 degrees    Maximum Clot Strength-MA 60.5 50.0 - 70.0 mm    Lysis 30 minutes-LY30 0.0 0.0 - 8.0 %    % Inhibition ADP 0.0 %    % Inhibition AA 0.0 %    TEG Algorithm Link Algorithm    Magnesium    Collection Time: 11/09/20  9:39 AM   Result Value Ref Range    Magnesium 1.7 1.5 - 2.5 mg/dL   TROPONIN    Collection Time: 11/09/20  9:39 AM   Result Value Ref Range    Troponin T 27 (H) 6 - 19 ng/L   PHOSPHORUS    Collection Time: 11/09/20  9:39 AM   Result Value Ref Range    Phosphorus 2.4 (L) 2.5 - 4.5 mg/dL   HEMOGLOBIN A1C    Collection Time: 11/09/20 11:00 AM    Result Value Ref Range    Glycohemoglobin 10.7 (H) 0.0 - 5.6 %    Est Avg Glucose 260 mg/dL   ABO Rh Confirm    Collection Time: 11/09/20 11:00 AM   Result Value Ref Range    ABO Rh Confirm A POS        Labs reviewed by me.       Imaging reviewed by me:     DX-CHEST-PORTABLE (1 VIEW)   Final Result      1.  There is no acute cardiopulmonary process.      CT-CTA NECK WITH & W/O-POST PROCESSING   Final Result      1.  CT angiogram of the neck demonstrating no significant carotid artery stenosis or occlusion.   2.  There is a mildly enlarged heterogeneous thyroid gland with multiple thyroid nodules which could be further assessed on a nonemergent basis with thyroid ultrasound.      CT-CEREBRAL PERFUSION ANALYSIS   Final Result      1.  Cerebral blood flow less than 30% likely representing completed infarct = 11 mL.      2.  T Max more than 6 seconds likely representing combination of completed infarct and ischemia = 23 mL.      3.  Mismatched volume likely representing ischemic brain/penumbra = 12 mL      4.  Please note that the cerebral perfusion was performed on the limited brain tissue around the basal ganglia region. Infarct/ischemia outside the CT perfusion sections can be missed in this study.      CT-CTA HEAD WITH & W/O-POST PROCESS   Final Result      1.  CT angiogram of the [place] demonstrate no significant arterial stenosis or proximal occlusion.      CT-HEAD W/O   Final Result      1.  There is a focal area of increased attenuation in the right man most consistent with a small area of hemorrhage.   2.  There is an old infarct in the left posterior medial occipital lobe.      MR-BRAIN-W/O    (Results Pending)   EC-ECHOCARDIOGRAM COMPLETE W/O CONT    (Results Pending)       Modified [place] Scale (MRS): 0 = No symptoms      ASSESSMENT AND PLAN:  55-year old male with PMhx significant for stroke (2014; Left PCA territory; no residual deficits), hypertension, dyslipidemia, type II DM who presented  to [facility] on 11/9/20 for a chief complaint of mild left sided weakness, slurred speech and Right sided headache; sudden onset 20-30 minutes after waking. CT head has revealed small Right pontine hemorrhage; likely hypertensive etiology given SBP 200s on arrival. ICH score 1.     Recommendations/Plan:     -q1h and PRN neuro assessment. VS per nursing/unit protocol.   -Goal SBP <150; please plan for slow downward titration so as to not hypoperfuse. Antihypertensives per primary.   -Telemetry; currently SR. Obtain TTE.   -Avoid all anticoagulation/antithrombotics at this time.   -Maintain strict euthermia, euglycemia, eunatremia; current Na is 134. q6h Na checks.   -PT/OT/SLP eval and treat.   -Will defer all other medical management to ICU team at this time.   -DVT PPX: SCDs.     The plan of care above has been discussed with Dr. Frausto.     ANASTACIA Perkins.P.R.N.  [employer]

## 2020-11-09 NOTE — ASSESSMENT & PLAN NOTE
Likely hypertensive bleed  Reviewed CT and MRI and CTA  Blood pressure control we will start carvedilol continue lisinopril and amlodipine and monitor blood pressure closely  As needed IV labetalol  DC bedrest and mobilize as tolerated  PT/OT/SLP

## 2020-11-09 NOTE — ED NOTES
This RN called floor due to ready bed. Previous pt just left room and room still has to be cleaned. ICU called bed planner to updated. Charge RN notified in ED. Pt updated on care and has no unmet needs. Floor to call when room is clean.

## 2020-11-09 NOTE — ASSESSMENT & PLAN NOTE
- Troponin T 27  - Likely from hypertensive emergency, hemorrhagic CVA  - Trend troponins  - TTE  - Telemetry  - No anticoagulation 2/2 hemorrhage

## 2020-11-09 NOTE — ED NOTES
Stroke Pre Alert Time: 0742  Vitals PTA: 212/111, 77,   FSBS PTA: 156  Deficits PTA: Left side weakness, headache,   Door Time: 0742  On set or Last Seen Normal: 0650  A&O on Arrival: C4  GCS:15  FSBS on Arrival:  On arrival deficits: left side weakness, headache

## 2020-11-09 NOTE — ASSESSMENT & PLAN NOTE
- Hypertensive bleed  - Wean nicardipine  - Hold all anticoagulation  - Follow up MRI reults  - Q 4hr neurochecks  - PT/OT

## 2020-11-09 NOTE — ASSESSMENT & PLAN NOTE
Blood pressure improved but still not at goal we will add carvedilol and titrate as needed  Continue amlodipine and lisinopril

## 2020-11-09 NOTE — ASSESSMENT & PLAN NOTE
Increase Lantus and continue sliding scale insulin  Monitor CBGs and adjust accordingly  Recent Labs     11/09/20  1739 11/09/20  2356 11/10/20  0558 11/10/20  0826 11/10/20  1216 11/10/20  1820 11/11/20  0040 11/11/20  0448   POCGLUCOSE 184* 176* 185* 148* 218* 187* 190* 175*

## 2020-11-09 NOTE — ED NOTES
Pt reports continued improvement. Pt requesting water but was updated on the NPO order. Pt offered mouth swab and declined at this time. Pt mother at bedside. VSS. Call light within reach,urinal at [email or web address]

## 2020-11-09 NOTE — ED TRIAGE NOTES
[facility] TRIAGE [facility]ICER ER REPORT  Consult/Admission requested by: Dr Brunson  Chief complaint: sudden onset of head ache  Pertinent history/ER Course: PMHx HLD, HTN, CVA, DM.  Presented with sudden onset of R HA and L weakness with slurred speech.  NIH 3.  Small pontine hemorrhage on CT.  SBP>200 nicardipine.    Code Status: full per ERP, I personally verified with the ERP patient's code status and the ERP has confirmed this with the patient.   Patient meets admission criterion: Yes..  Recommendations given or work up & consultations requested per triage officer:   Consultants involved and pertinent input from consultants: Neuro, Intensivist  Admission status: Inpatient.   Admission order placed: Yes.   Floor requested: RICU  Assigned hospitalist: He

## 2020-11-10 PROBLEM — G47.33 OSA (OBSTRUCTIVE SLEEP APNEA): Status: ACTIVE | Noted: 2020-11-10

## 2020-11-10 LAB
ANION GAP SERPL CALC-SCNC: 8 MMOL/L (ref 7–16)
BASOPHILS # BLD AUTO: 0.5 % (ref 0–1.8)
BASOPHILS # BLD: 0.03 K/UL (ref 0–0.12)
BUN SERPL-MCNC: 9 MG/DL (ref 8–22)
CALCIUM SERPL-MCNC: 8.6 MG/DL (ref 8.5–10.5)
CHLORIDE SERPL-SCNC: 101 MMOL/L (ref 96–112)
CO2 SERPL-SCNC: 24 MMOL/L (ref 20–33)
CREAT SERPL-MCNC: 0.66 MG/DL (ref 0.5–1.4)
EOSINOPHIL # BLD AUTO: 0.08 K/UL (ref 0–0.51)
EOSINOPHIL NFR BLD: 1.2 % (ref 0–6.9)
ERYTHROCYTE [DISTWIDTH] IN BLOOD BY AUTOMATED COUNT: 45.3 FL (ref 35.9–50)
GLUCOSE BLD-MCNC: 148 MG/DL (ref 65–99)
GLUCOSE BLD-MCNC: 176 MG/DL (ref 65–99)
GLUCOSE BLD-MCNC: 185 MG/DL (ref 65–99)
GLUCOSE BLD-MCNC: 187 MG/DL (ref 65–99)
GLUCOSE BLD-MCNC: 218 MG/DL (ref 65–99)
GLUCOSE SERPL-MCNC: 191 MG/DL (ref 65–99)
HCT VFR BLD AUTO: 43.6 % (ref 42–52)
HGB BLD-MCNC: 14.2 G/DL (ref 14–18)
IMM GRANULOCYTES # BLD AUTO: 0.02 K/UL (ref 0–0.11)
IMM GRANULOCYTES NFR BLD AUTO: 0.3 % (ref 0–0.9)
LYMPHOCYTES # BLD AUTO: 1.96 K/UL (ref 1–4.8)
LYMPHOCYTES NFR BLD: 29.7 % (ref 22–41)
MAGNESIUM SERPL-MCNC: 1.9 MG/DL (ref 1.5–2.5)
MCH RBC QN AUTO: 26.4 PG (ref 27–33)
MCHC RBC AUTO-ENTMCNC: 32.6 G/DL (ref 33.7–35.3)
MCV RBC AUTO: 81 FL (ref 81.4–97.8)
MONOCYTES # BLD AUTO: 0.47 K/UL (ref 0–0.85)
MONOCYTES NFR BLD AUTO: 7.1 % (ref 0–13.4)
NEUTROPHILS # BLD AUTO: 4.04 K/UL (ref 1.82–7.42)
NEUTROPHILS NFR BLD: 61.2 % (ref 44–72)
NRBC # BLD AUTO: 0 K/UL
NRBC BLD-RTO: 0 /100 WBC
PLATELET # BLD AUTO: 187 K/UL (ref 164–446)
PMV BLD AUTO: 10.4 FL (ref 9–12.9)
POTASSIUM SERPL-SCNC: 3.6 MMOL/L (ref 3.6–5.5)
RBC # BLD AUTO: 5.38 M/UL (ref 4.7–6.1)
SODIUM SERPL-SCNC: 133 MMOL/L (ref 135–145)
WBC # BLD AUTO: 6.6 K/UL (ref 4.8–10.8)

## 2020-11-10 PROCEDURE — A9270 NON-COVERED ITEM OR SERVICE: HCPCS | Performed by: INTERNAL MEDICINE

## 2020-11-10 PROCEDURE — 70551 MRI BRAIN STEM W/O DYE: CPT

## 2020-11-10 PROCEDURE — 700101 HCHG RX REV CODE 250: Performed by: PSYCHIATRY & NEUROLOGY

## 2020-11-10 PROCEDURE — 83735 ASSAY OF MAGNESIUM: CPT

## 2020-11-10 PROCEDURE — 700102 HCHG RX REV CODE 250 W/ 637 OVERRIDE(OP): Performed by: PSYCHIATRY & NEUROLOGY

## 2020-11-10 PROCEDURE — 99233 SBSQ HOSP IP/OBS HIGH 50: CPT | Performed by: PSYCHIATRY & NEUROLOGY

## 2020-11-10 PROCEDURE — 700105 HCHG RX REV CODE 258: Performed by: INTERNAL MEDICINE

## 2020-11-10 PROCEDURE — 770022 HCHG ROOM/CARE - ICU (200)

## 2020-11-10 PROCEDURE — 82962 GLUCOSE BLOOD TEST: CPT

## 2020-11-10 PROCEDURE — 85025 COMPLETE CBC W/AUTO DIFF WBC: CPT

## 2020-11-10 PROCEDURE — 80048 BASIC METABOLIC PNL TOTAL CA: CPT

## 2020-11-10 PROCEDURE — 700102 HCHG RX REV CODE 250 W/ 637 OVERRIDE(OP): Performed by: INTERNAL MEDICINE

## 2020-11-10 PROCEDURE — 700111 HCHG RX REV CODE 636 W/ 250 OVERRIDE (IP): Performed by: PSYCHIATRY & NEUROLOGY

## 2020-11-10 PROCEDURE — A9270 NON-COVERED ITEM OR SERVICE: HCPCS | Performed by: PSYCHIATRY & NEUROLOGY

## 2020-11-10 PROCEDURE — 92526 ORAL FUNCTION THERAPY: CPT

## 2020-11-10 RX ORDER — POTASSIUM CHLORIDE 20 MEQ/1
60 TABLET, EXTENDED RELEASE ORAL ONCE
Status: COMPLETED | OUTPATIENT
Start: 2020-11-10 | End: 2020-11-10

## 2020-11-10 RX ORDER — LABETALOL HYDROCHLORIDE 5 MG/ML
10-20 INJECTION, SOLUTION INTRAVENOUS EVERY 4 HOURS PRN
Status: DISCONTINUED | OUTPATIENT
Start: 2020-11-10 | End: 2020-11-12 | Stop reason: HOSPADM

## 2020-11-10 RX ORDER — HYDRALAZINE HYDROCHLORIDE 50 MG/1
25 TABLET, FILM COATED ORAL EVERY 8 HOURS PRN
Status: DISCONTINUED | OUTPATIENT
Start: 2020-11-10 | End: 2020-11-12 | Stop reason: HOSPADM

## 2020-11-10 RX ORDER — MAGNESIUM SULFATE HEPTAHYDRATE 40 MG/ML
2 INJECTION, SOLUTION INTRAVENOUS ONCE
Status: COMPLETED | OUTPATIENT
Start: 2020-11-10 | End: 2020-11-10

## 2020-11-10 RX ORDER — AMLODIPINE BESYLATE 10 MG/1
10 TABLET ORAL
Status: DISCONTINUED | OUTPATIENT
Start: 2020-11-10 | End: 2020-11-12 | Stop reason: HOSPADM

## 2020-11-10 RX ADMIN — SODIUM CHLORIDE: 9 INJECTION, SOLUTION INTRAVENOUS at 07:08

## 2020-11-10 RX ADMIN — MAGNESIUM SULFATE 2 G: 2 INJECTION INTRAVENOUS at 08:56

## 2020-11-10 RX ADMIN — POTASSIUM CHLORIDE 60 MEQ: 1500 TABLET, EXTENDED RELEASE ORAL at 08:56

## 2020-11-10 RX ADMIN — HYDRALAZINE HYDROCHLORIDE 25 MG: 50 TABLET, FILM COATED ORAL at 08:56

## 2020-11-10 RX ADMIN — PRAVASTATIN SODIUM 40 MG: 20 TABLET ORAL at 16:52

## 2020-11-10 RX ADMIN — INSULIN HUMAN 3 UNITS: 100 INJECTION, SOLUTION PARENTERAL at 13:03

## 2020-11-10 RX ADMIN — INSULIN HUMAN 2 UNITS: 100 INJECTION, SOLUTION PARENTERAL at 06:00

## 2020-11-10 RX ADMIN — INSULIN GLARGINE 15 UNITS: 100 INJECTION, SOLUTION SUBCUTANEOUS at 18:23

## 2020-11-10 RX ADMIN — DOCUSATE SODIUM 50 MG AND SENNOSIDES 8.6 MG 2 TABLET: 8.6; 5 TABLET, FILM COATED ORAL at 06:00

## 2020-11-10 RX ADMIN — LISINOPRIL 40 MG: 20 TABLET ORAL at 16:51

## 2020-11-10 RX ADMIN — HYDRALAZINE HYDROCHLORIDE 25 MG: 50 TABLET, FILM COATED ORAL at 22:07

## 2020-11-10 RX ADMIN — SERTRALINE HYDROCHLORIDE 50 MG: 50 TABLET ORAL at 16:52

## 2020-11-10 RX ADMIN — AMLODIPINE BESYLATE 10 MG: 10 TABLET ORAL at 07:50

## 2020-11-10 RX ADMIN — DOCUSATE SODIUM 50 MG AND SENNOSIDES 8.6 MG 2 TABLET: 8.6; 5 TABLET, FILM COATED ORAL at 16:51

## 2020-11-10 RX ADMIN — INSULIN HUMAN 2 UNITS: 100 INJECTION, SOLUTION PARENTERAL at 18:22

## 2020-11-10 RX ADMIN — LABETALOL HYDROCHLORIDE 10 MG: 5 INJECTION, SOLUTION INTRAVENOUS at 18:32

## 2020-11-10 ASSESSMENT — ENCOUNTER SYMPTOMS
NAUSEA: 0
HEADACHES: 0
RESPIRATORY NEGATIVE: 1
PSYCHIATRIC NEGATIVE: 1
CONSTITUTIONAL NEGATIVE: 1
VOMITING: 0
MUSCULOSKELETAL NEGATIVE: 1
BLURRED VISION: 0
CARDIOVASCULAR NEGATIVE: 1
FOCAL WEAKNESS: 1

## 2020-11-10 ASSESSMENT — FIBROSIS 4 INDEX: FIB4 SCORE: 1

## 2020-11-10 NOTE — THERAPY
Missed Therapy Evaluation    Patient Name: Joey Dawson  Age:  55 y.o., Sex:  male  Medical Record #: [id number]  Today's Date: 11/10/2020    PT order received, pt with active bed rest order at this time. PT eval will be held until order is discontinued and pt is cleared for mobility    Mckenna Hamilton PT, DPT

## 2020-11-10 NOTE — THERAPY
Occupational Therapy Contact Note     Patient Name: Joey Dawson  Age:  55 y.o., Sex:  male  Medical Record #: [id number]  Today's Date: 11/10/2020       11/10/20 0723   Interdisciplinary Plan of Care Collaboration   Collaboration Comments OT order received. Pt has active bed rest orders at this time. Will initiate OT evaluation once bed rest has been discontinued.

## 2020-11-10 NOTE — PROGRESS NOTES
Chief Complaint   Patient presents with   • Weakness     left sided   • Headache       Problem List Items Addressed This Visit     * (Principal) Acute hemorrhagic CVA (Pontine hemorrhage) (HCC)     - Hypertensive bleed  - Wean nicardipine  - Hold all anticoagulation  - Follow up MRI reults  - Q 4hr neurochecks  - PT/OT         Relevant Orders    REFERRAL TO PHYSIATRY (PMR)    REFERRAL TO NEUROLOGY      Other Visit Diagnoses     Uncontrolled hypertension        Relevant Medications    lisinopril (PRINIVIL) tablet 40 mg    pravastatin (PRAVACHOL) tablet 40 mg    cloNIDine (CATAPRES) tablet 0.1 mg    labetalol (NORMODYNE/TRANDATE) injection 10-20 mg    amLODIPine (NORVASC) tablet 10 mg    hydrALAZINE (APRESOLINE) tablet 25 mg    Left-sided weakness        Ataxia            Neurology Stroke Progress Note    History of present illness:  This is a 55-year old male with PMhx significant for stroke (2014; Left PCA territory; no residual deficits), hypertension, dyslipidemia, type II DM who presented to [facility] on 11/9/20 for a chief complaint of mild left sided weakness, slurred speech and Right sided headache. The patient states that last night, he had a sleep study; he awoke this morning feeling well, around 0630. At 0650, he was taking a shower when he noted sudden onset of the above noted symptoms. EMS was called; on scene, ; BG WNL. At time of presentation here, NIHSS 3-4; notably had mild ataxia per exam as well. STAT CT head revealed tiny acute hemorrhage involving right/central man; likely hypertensive etiology given hypertensive crisis on arrival. ICH score 1.   Currently, patient is sitting up in stretcher; awake and alert. Still with very mild slurred speech, LUE/LLE weakness and mild ataxia/dysmetria. He admits to mild persistent Right sided headache as well. He denies dizziness, numbness, paresthesia, problem with vision or problem swallowing.     Neurology has been consulted by Dr. Dickson  ADAN Brunson to further evaluate the findings noted above.      Interval, 11/10/20:   Patient is sitting up in bed; awake and alert. States that he feels well. Still with mild LUE ataxia and dysarthria. Headache to top of head. No events overnight; SBP 130s-150s.     Patient s/p MRI Brain this morning; confirms Right pontine hemorrhage; notably, GRE with several other areas of chronic appearing/old hemorrhage; all likely hypertensive given poorly controlled blood pressure.     No changes to HPI as was previously documented.      Past medical history:   Past Medical History:   Diagnosis Date   • Hyperlipidemia    • Hypertension    • Stroke (HCC)    • Type II or unspecified type diabetes mellitus without mention of complication, not stated as uncontrolled        Past surgical history:   History reviewed. No pertinent surgical history.    Family history:   Family History   Problem Relation Age of Onset   • Cancer Mother         Breast cancer   • Heart Disease Father         CHF       Social history:   Social History     Socioeconomic History   • Marital status: Single     Spouse name: Not on file   • Number of children: Not on file   • Years of education: Not on file   • Highest education level: Not on file   Occupational History   • Not on file   Social Needs   • Financial resource strain: Not on file   • Food insecurity     Worry: Not on file     Inability: Not on file   • Transportation needs     Medical: Not on file     Non-medical: Not on file   Tobacco Use   • Smoking status: Never Smoker   • Smokeless tobacco: Never Used   Substance and Sexual Activity   • Alcohol use: Yes     Alcohol/week: 1.2 oz     Types: 2 Cans of beer per week     Comment: yearly   • Drug use: No   • Sexual activity: Yes   Lifestyle   • Physical activity     Days per week: Not on file     Minutes per session: Not on file   • Stress: Not on file   Relationships   • Social connections     Talks on phone: Not on file     Gets together: Not on file      Attends Yarsanism service: Not on file     Active member of club or organization: Not on file     Attends meetings of clubs or organizations: Not on file     Relationship status: Not on file   • Intimate partner violence     Fear of current or ex partner: Not on file     Emotionally abused: Not on file     Physically abused: Not on file     Forced sexual activity: Not on file   Other Topics Concern   • Not on file   Social History Narrative   • Not on file       Current medications:   Current Facility-Administered Medications   Medication Dose   • labetalol (NORMODYNE/TRANDATE) injection 10-20 mg  10-20 mg   • amLODIPine (NORVASC) tablet 10 mg  10 mg   • hydrALAZINE (APRESOLINE) tablet 25 mg  25 mg   • magnesium sulfate IVPB premix 2 g  2 g   • insulin glargine (Lantus) injection  15 Units   • lisinopril (PRINIVIL) tablet 40 mg  40 mg   • pravastatin (PRAVACHOL) tablet 40 mg  40 mg   • sertraline (Zoloft) tablet 50 mg  50 mg   • Respiratory Therapy Consult     • ondansetron (ZOFRAN ODT) dispertab 4 mg  4 mg   • acetaminophen (Tylenol) tablet 650 mg  650 mg    Or   • acetaminophen (TYLENOL) suppository 650 mg  650 mg   • MD Alert...ICU Electrolyte Replacement per Pharmacy     • cloNIDine (CATAPRES) tablet 0.1 mg  0.1 mg   • ondansetron (ZOFRAN) syringe/vial injection 4 mg  4 mg   • promethazine (PHENERGAN) tablet 12.5-25 mg  12.5-25 mg   • promethazine (PHENERGAN) suppository 12.5-25 mg  12.5-25 mg   • prochlorperazine (COMPAZINE) injection 5-10 mg  5-10 mg   • senna-docusate (PERICOLACE or SENOKOT S) 8.6-50 MG per tablet 2 Tab  2 Tab    And   • polyethylene glycol/lytes (MIRALAX) PACKET 1 Packet  1 Packet    And   • magnesium hydroxide (MILK OF MAGNESIA) suspension 30 mL  30 mL    And   • bisacodyl (DULCOLAX) suppository 10 mg  10 mg   • insulin regular (HumuLIN R,NovoLIN R) injection  2-9 Units    And   • glucose 4 g chewable tablet 16 g  16 g    And   • dextrose 50% (D50W) injection 50 mL  50 mL        Medication Allergy:  Allergies   Allergen Reactions   • Food      Spicy cinnamon--sores in mouth and tongue swelling       Review of systems:   Constitutional: denies fever, night sweats, weight loss.   Eyes: denies acute vision change, eye pain or secretion.   Ears, Nose, Mouth, Throat: denies nasal secretion, nasal bleeding, difficulty swallowing, hearing loss, tinnitus, vertigo, ear pain, acute dental problems, oral ulcers or lesions.   Endocrine: denies recent weight changes, heat or cold intolerance, polyuria, polydypsia, polyphagia,abnormal hair growth.  Cardiovascular: denies new onset of chest pain, palpitations, syncope, or dyspnea of exertion.  Pulmonary: denies shortness of breath, new onset of cough, hemoptysis, wheezing, chest pain or flu-like symptoms.   GI: denies nausea, vomiting, diarrhea, GI bleeding, change in appetite, abdominal pain, and change in bowel habits.  : denies dysuria, urinary incontinence, hematuria.  Heme/oncology: denies history of easy bruising or bleeding. No history of cancer, DVTor PE.  Allergy/immunology: denies hives/urticaria, or itching.   Dermatologic: denies new rash, or new skin lesions.  Musculoskeletal:denies joint swelling or pain, muscle pain, neck and back pain.   Neurologic: As noted above.   Psychiatric: denies symptoms of depression, anxiety, hallucinations, mood swings or changes, suicidal or homicidal thoughts.     Physical examination:   Vitals:    11/10/20 0545 11/10/20 0600 11/10/20 0827 11/10/20 0856   BP: 141/73 133/73 152/78 (!) 167/83   Pulse: 62 70 66 78   Resp: 13 17 18    Temp:   36.6 °C (97.9 °F)    TempSrc:   Temporal    SpO2: 95% 94% 95%    Weight:       Height:         General: Patient in no acute distress, pleasant and cooperative.  HEENT: Normocephalic, no signs of acute trauma.   Neck: supple, no meningeal signs or carotid bruits. There is normal range of motion. No tenderness on exam.   Chest: clear to auscultation. No cough.   CV:  RRR, no murmurs.   Skin: no signs of acute rashes or trauma.   Musculoskeletal: joints exhibit full range of motion, without any pain to palpation. There are no signs of joint or muscle swelling. There is no tenderness to deep palpation of muscles.   Psychiatric: No hallucinatory behavior.      NEUROLOGICAL EXAM:   Mental status, orientation: Awake, alert and fully oriented.   Speech and language: speech is fluent, very mildly dysarthric. The patient is able to name, repeat and comprehend.   Memory: There is intact recollection of recent and remote events.   Cranial nerve exam: Pupils are 3-4 mm bilaterally and equally reactive to light and accommodation. Visual fields are intact by confrontation.There is no nystagmus on primary or secondary gaze. Intact full EOM in all directions of gaze. Face appears symmetric. Sensation in the face is intact to light touch. Uvula is midline. Palate elevates symmetrically. Tongue is midline and without any signs of tongue biting or fasciculations. Sternocleidomastoid muscles exhibit is normal strength bilaterally. Shoulder shrug is intact bilaterally.   Motor exam: Strength is 5/5 in RUE/RLE; 4+/5 to LUE/LLE. Tone is normal. No abnormal movements were seen on exam.   Sensory exam reveals normal sense of light touch and pinprick in all extremities.   Deep tendon reflexes:  2+ throughout. Plantar responses are flexor. There is no clonus.   Coordination: mild LUE dysmetria appreciated.    Gait: Not assessed at this time as patient is a fall risk.     No significant changes to exam as was documented on 11/9/20.     ICH SCORE: 1    ANCILLARY DATA REVIEWED:     Lab Data Review:  Recent Results (from the past 24 hour(s))   HEMOGLOBIN A1C    Collection Time: 11/09/20 11:00 AM   Result Value Ref Range    Glycohemoglobin 10.7 (H) 0.0 - 5.6 %    Est Avg Glucose 260 mg/dL   ABO Rh Confirm    Collection Time: 11/09/20 11:00 AM   Result Value Ref Range    ABO Rh Confirm A POS     EC-ECHOCARDIOGRAM COMPLETE W/ CONT    Collection Time: 11/09/20  3:15 PM   Result Value Ref Range    Left Ventrical Ejection Fraction 60    TROPONIN    Collection Time: 11/09/20  3:50 PM   Result Value Ref Range    Troponin T 26 (H) 6 - 19 ng/L   SODIUM SERUM (NA)    Collection Time: 11/09/20  3:50 PM   Result Value Ref Range    Sodium 137 135 - 145 mmol/L   ACCU-CHEK GLUCOSE    Collection Time: 11/09/20  5:39 PM   Result Value Ref Range    Glucose - Accu-Ck 184 (H) 65 - 99 mg/dL   Sodium Serum (NA)    Collection Time: 11/09/20 10:15 PM   Result Value Ref Range    Sodium 132 (L) 135 - 145 mmol/L   TROPONIN    Collection Time: 11/09/20 10:15 PM   Result Value Ref Range    Troponin T 27 (H) 6 - 19 ng/L   ACCU-CHEK GLUCOSE    Collection Time: 11/09/20 11:56 PM   Result Value Ref Range    Glucose - Accu-Ck 176 (H) 65 - 99 mg/dL   CBC with Differential    Collection Time: 11/10/20  5:55 AM   Result Value Ref Range    WBC 6.6 4.8 - 10.8 K/uL    RBC 5.38 4.70 - 6.10 M/uL    Hemoglobin 14.2 14.0 - 18.0 g/dL    Hematocrit 43.6 42.0 - 52.0 %    MCV 81.0 (L) 81.4 - 97.8 fL    MCH 26.4 (L) 27.0 - 33.0 pg    MCHC 32.6 (L) 33.7 - 35.3 g/dL    RDW 45.3 35.9 - 50.0 fL    Platelet Count 187 164 - 446 K/uL    MPV 10.4 9.0 - 12.9 fL    Neutrophils-Polys 61.20 44.00 - 72.00 %    Lymphocytes 29.70 22.00 - 41.00 %    Monocytes 7.10 0.00 - 13.40 %    Eosinophils 1.20 0.00 - 6.90 %    Basophils 0.50 0.00 - 1.80 %    Immature Granulocytes 0.30 0.00 - 0.90 %    Nucleated RBC 0.00 /100 WBC    Neutrophils (Absolute) 4.04 1.82 - 7.42 K/uL    Lymphs (Absolute) 1.96 1.00 - 4.80 K/uL    Monos (Absolute) 0.47 0.00 - 0.85 K/uL    Eos (Absolute) 0.08 0.00 - 0.51 K/uL    Baso (Absolute) 0.03 0.00 - 0.12 K/uL    Immature Granulocytes (abs) 0.02 0.00 - 0.11 K/uL    NRBC (Absolute) 0.00 K/uL   Basic Metabolic Panel (BMP)    Collection Time: 11/10/20  5:55 AM   Result Value Ref Range    Sodium 133 (L) 135 - 145 mmol/L    Potassium 3.6 3.6 - 5.5 mmol/L     Chloride 101 96 - 112 mmol/L    Co2 24 20 - 33 mmol/L    Glucose 191 (H) 65 - 99 mg/dL    Bun 9 8 - 22 mg/dL    Creatinine 0.66 0.50 - 1.40 mg/dL    Calcium 8.6 8.5 - 10.5 mg/dL    Anion Gap 8.0 7.0 - 16.0   MAGNESIUM    Collection Time: 11/10/20  5:55 AM   Result Value Ref Range    Magnesium 1.9 1.5 - 2.5 mg/dL   ESTIMATED GFR    Collection Time: 11/10/20  5:55 AM   Result Value Ref Range    GFR If African American >60 >60 mL/min/1.73 m 2    GFR If Non African American >60 >60 mL/min/1.73 m 2   ACCU-CHEK GLUCOSE    Collection Time: 11/10/20  5:58 AM   Result Value Ref Range    Glucose - Accu-Ck 185 (H) 65 - 99 mg/dL   ACCU-CHEK GLUCOSE    Collection Time: 11/10/20  8:26 AM   Result Value Ref Range    Glucose - Accu-Ck 148 (H) 65 - 99 mg/dL       Labs reviewed by me.       Imaging reviewed by me:     MR-BRAIN-W/O   Final Result      1.  There is an approximately 13 x 16 mm sized acute right midbrain/cerebral peduncle hemorrhage.   2.  There are areas of small chronic hemorrhages in the basal ganglia and right medial parietal lobe.   3.  Large encephalomalacia with chronic hemorrhagic products in the left occipital lobe.   4.  Multiple chronic small infarcts.   5.  Mild chronic microvascular ischemic disease.   6.  Mild cerebral volume loss.      EC-ECHOCARDIOGRAM COMPLETE W/ CONT   Final Result      DX-CHEST-PORTABLE (1 VIEW)   Final Result      1.  There is no acute cardiopulmonary process.      CT-CTA NECK WITH & W/O-POST PROCESSING   Final Result      1.  CT angiogram of the neck demonstrating no significant carotid artery stenosis or occlusion.   2.  There is a mildly enlarged heterogeneous thyroid gland with multiple thyroid nodules which could be further assessed on a nonemergent basis with thyroid ultrasound.      CT-CEREBRAL PERFUSION ANALYSIS   Final Result      1.  Cerebral blood flow less than 30% likely representing completed infarct = 11 mL.      2.  T Max more than 6 seconds likely representing  combination of completed infarct and ischemia = 23 mL.      3.  Mismatched volume likely representing ischemic brain/penumbra = 12 mL      4.  Please note that the cerebral perfusion was performed on the limited brain tissue around the basal ganglia region. Infarct/ischemia outside the CT perfusion sections can be missed in this study.      CT-CTA HEAD WITH & W/O-POST PROCESS   Final Result      1.  CT angiogram of the [place] demonstrate no significant arterial stenosis or proximal occlusion.      CT-HEAD W/O   Final Result      1.  There is a focal area of increased attenuation in the right man most consistent with a small area of hemorrhage.   2.  There is an old infarct in the left posterior medial occipital lobe.          Modified [place] Scale (MRS): 0 = No symptoms      ASSESSMENT AND PLAN:  55-year old male with PMhx significant for stroke (2014; Left PCA territory; no residual deficits), hypertension, dyslipidemia, type II DM who presented to [facility] on 11/9/20 for a chief complaint of mild left sided weakness, slurred speech and Right sided headache; sudden onset 20-30 minutes after waking. CT head has revealed small Right pontine hemorrhage; likely hypertensive etiology given SBP 200s on arrival. ICH score 1. This morning, patient appears well; exam is stable, unchanged. MRI Brain has further confirmed Right pontine hemorrhage, with GRE sequence demonstrating numerous old areas of hemorrhage-- likely reflective of poorly controlled hypertension.    Recommendations/Plan:     -q2h and PRN neuro assessment. VS per nursing/unit protocol.   -Goal SBP <150; Antihypertensives per primary.   -Telemetry; currently SR. TTE with EF 60%; no gross structural abnormalities.   -Avoid all anticoagulation/antithrombotics at this time.   -Maintain strict euthermia, euglycemia, eunatremia; current Na is 133. q6h Na checks.   -PT/OT/SLP eval and treat. Physiatry consult.   -Will defer all other medical  management to ICU team at this time.   -DVT PPX: SCDs.     The plan of care above has been discussed with Dr. Frausto.     ANASTACIA Perkins.P.RISH.  [facility]

## 2020-11-10 NOTE — DISCHARGE PLANNING
Hospital Care Management Team Assessment    In the case of an emergency, contact pt's mother, Farheen Dawson at  [phone].    This Student SW spoke with pt at [facility] and obtained the information used in this assessment. Pt verified accuracy of facesheet. Pt's PCP is ESTRELLA Armando.  Pt lives in a one-jahaira house with his mom, niece, niece's boyfriend, and nieces child. Pt uses the [facility] pharmacy in [place], [place]. Prior to current hospitalization, pt was independent with ADLS/IADLS. Pt uses CPAP machine at night. Pt drives himself . Pt has prescription coverage through primary insurance, [employer]. Pt denies history of substance abuse. Pt reports history of depression for the last 6 years when he moved to [place] from the [facility]. Pt reports taking prescription medication for depression. SW offered mental health resources, pt declined. Pt's support system includes mom, niece, and aunt. Pt requested his Aunt Lanette be added to emergency contact list. SW added aunt to emergency contact list. Pt does not have advanced directive and declined information packet. Pt's discharge plan is TBD. Pt still requires ICU level of care.    Care Transition Team Assessment    Information Source  Orientation : Oriented x 4  Information Given By: Patient  Informant's Name: Farheen  Who is responsible for making decisions for patient? : Patient    Readmission Evaluation  Is this a readmission?: No    Elopement Risk  Legal Hold: No  Ambulatory or Self Mobile in Wheelchair: No-Not an Elopement Risk    Interdisciplinary Discharge Planning  Lives with - Patient's Self Care Capacity: Parents  Patient or legal guardian wants to designate a caregiver: Yes  Caregiver name: Farheen Dawson  Caregiver contact info: [phone]  ([facility]) Authorization for Release of Health Information has been completed: Yes  Support Systems: Family Member(s), Parent  Housing / Facility: 1 Story House(One step into house)  Durable Medical Equipment: Not  Applicable    Discharge Preparedness  What is your plan after discharge?: Home with help  What are your discharge supports?: Parent, Other (comment)(Aunt, niece)  Prior Functional Level: Drives Self, Independent with Activities of Daily Living, Independent with Medication Management  Difficulity with ADLs: None  Difficulity with IADLs: None    Functional Assesment  Prior Functional Level: Drives Self, Independent with Activities of Daily Living, Independent with Medication Management    Finances  Financial Barriers to Discharge: No  Prescription Coverage: Yes    Vision / Hearing Impairment  Vision Impairment : No  Hearing Impairment : No    Advance Directive  Advance Directive?: None  Advance Directive offered?: AD Booklet refused    Domestic Abuse  Have you ever been the victim of abuse or violence?: No  Physical Abuse or Sexual Abuse: No  Verbal Abuse or Emotional Abuse: No  Possible Abuse/Neglect Reported to:: Not Applicable    Psychological Assessment  History of Substance Abuse: None  History of Psychiatric Problems: Yes(Depression)  Non-compliant with Treatment: No  Newly Diagnosed Illness: Yes    Anticipated Discharge Information  Discharge Disposition: Still a Patient (30)

## 2020-11-10 NOTE — PROGRESS NOTES
Received bedsidereport JEF Casas in ER. All questions answered. Patient transferred from Blue22 to room R105 with ACLS RN on monitor. Family updated on POC. All patient belongings with patient. Patient assessed per OBS. Medicated per MAR. All orders followed.

## 2020-11-10 NOTE — THERAPY
"Speech Language Pathology  Daily Treatment     Patient Name: Joey Dawson  Age:  55 y.o., Sex:  male  Medical Record #: [id number]  Today's Date: 11/10/2020       Assessment    The patient was seen for dysphagia therapy this date. The patient was seen during his regular/thin liquid meal items. The patient was awake, alert and denied any difficulty with swallowing or PO items since last SLP session. The patient consumed PO items from meal items with no overt s/s of aspiration or difficulty. The patient was able to follow swallow strategies independently this session. The patient reported periods of difficulty with speech but reported overall improvement and during dysphagia therapy no overt speech issues were appreciated. Education was provided regarding role of SLP in acute care. SLP will follow for cognitive evaluation if still indicated by IDT.     Plan    Recommendations:  1) Continue regular/thin liquid meal items 2) SLP will follow for cognitive evaluation if still indicated per IDT.     Discharge secondary to goals met.    Discharge Recommendations: Anticipate that the patient will have no further speech therapy needs after discharge from the hospital       Objective       11/10/20 0825   Verbal Expression   Comments Functional for expressing wants and needs during dysphagia therapy.    Auditory Comprehension   Comments Followed simple directives appropriately.    Dysphagia    Other Treatments Regular/thin liquid meal tray.    Diet / Liquid Recommendation Regular (7);Thin (0)   Nutritional Liquid Intake Rating Scale Non thickened beverages   Nutritional Food Intake Rating Scale Total oral diet with no restrictions   Skilled Intervention   (none)   Recommended Route of Medication Administration   Medication Administration  Whole with Liquid Wash   Patient / Family Goals   Patient / Family Goal #1 \"I am so hungry!\"   Goal #1 Outcome Goal met   Short Term Goals   Short Term Goal # 1 Patient will consume a " regular/thin liquid diet with no overt s/sx of aspiration given min cues to swallow strategies.    Goal Outcome # 1 Goal met

## 2020-11-10 NOTE — PROGRESS NOTES
Critical Care Progress Note    Date of admission  11/9/2020    Chief Complaint  55 y.o. male admitted 11/9/2020 with ICH    Hospital Course  No notes on file  11/9 admitted with small pontine hemorrhage likely 2/2 HTN    Interval Problem Update  Reviewed last 24 hour events:  Afebrile  HR 70s  -130s  GCS 15 Mild LUE UMN pattern of weakness  Mri pending   U/o ~ 1.2L    Review of Systems  Review of Systems   Constitutional: Negative.    HENT: Negative.    Eyes: Negative for blurred vision.   Respiratory: Negative.    Cardiovascular: Negative.    Gastrointestinal: Negative for nausea and vomiting.   Genitourinary: Negative.    Musculoskeletal: Negative.    Skin: Negative.    Neurological: Positive for focal weakness. Negative for headaches.   Endo/Heme/Allergies: Negative.    Psychiatric/Behavioral: Negative.         Vital Signs for last 24 hours   Temp:  [36.2 °C (97.2 °F)-36.8 °C (98.2 °F)] 36.7 °C (98 °F)  Pulse:  [59-84] 70  Resp:  [10-70] 17  BP: (120-223)/() 133/73  SpO2:  [90 %-95 %] 94 %    Hemodynamic parameters for last 24 hours       Respiratory Information for the last 24 hours       Physical Exam   Physical Exam  Constitutional:       Appearance: He is obese.   HENT:      Head: Normocephalic and atraumatic.      Right Ear: External ear normal.      Left Ear: External ear normal.      Nose: Nose normal.      Mouth/Throat:      Pharynx: Oropharynx is clear.   Eyes:      Extraocular Movements: Extraocular movements intact.      Pupils: Pupils are equal, round, and reactive to light.   Cardiovascular:      Rate and Rhythm: Normal rate and regular rhythm.      Pulses: Normal pulses.   Pulmonary:      Effort: Pulmonary effort is normal. No respiratory distress.   Abdominal:      General: Abdomen is flat.   Skin:     General: Skin is warm and dry.      Capillary Refill: Capillary refill takes 2 to 3 seconds.   Neurological:      Mental Status: He is alert.      Comments: GCS 15. CN 2-12 intact.  Antigravity throughout. No abnormal movements. LUE/LLE UMN pattern of weakness.   Psychiatric:         Mood and Affect: Mood normal.         Behavior: Behavior normal.         Medications  Current Facility-Administered Medications   Medication Dose Route Frequency Provider Last Rate Last Admin   • niCARdipine (CARDENE) 25 mg in  mL Infusion  0-15 mg/hr Intravenous Continuous Hang Cutler M.D. 40 mL/hr at 11/09/20 1850 4 mg/hr at 11/09/20 1850   • insulin glargine (Lantus) injection  15 Units Subcutaneous Q EVENING Hang Cutler M.D.   15 Units at 11/09/20 1739   • lisinopril (PRINIVIL) tablet 40 mg  40 mg Oral Q EVENING Hang Cutler M.D.   40 mg at 11/09/20 1734   • pravastatin (PRAVACHOL) tablet 40 mg  40 mg Oral Q EVENING Hang Cutler M.D.   40 mg at 11/09/20 1747   • sertraline (Zoloft) tablet 50 mg  50 mg Oral Q EVENING Hang Cutler M.D.   50 mg at 11/09/20 1748   • Respiratory Therapy Consult   Nebulization Continuous RT Hang Cutler M.D.       • NS infusion   Intravenous Continuous Hang Cutler M.D. 80 mL/hr at 11/09/20 1711 New Bag at 11/09/20 1711   • ondansetron (ZOFRAN ODT) dispertab 4 mg  4 mg Oral Q4HRS PRN Hang Cutler M.D.       • acetaminophen (Tylenol) tablet 650 mg  650 mg Oral Q4HRS PRN Hang Cutler M.D.        Or   • acetaminophen (TYLENOL) suppository 650 mg  650 mg Rectal Q4HRS PRN Hang Cutler M.D.       • LORazepam (ATIVAN) injection 2 mg  2 mg Intravenous Q5 MIN PRN Hang Cutler M.D.       • MD Alert...ICU Electrolyte Replacement per Pharmacy   Other PHARMACY TO DOSE Hang Cutler M.D.       • cloNIDine (CATAPRES) tablet 0.1 mg  0.1 mg Oral Q6HRS PRN Hang F Laus, M.D.       • labetalol (NORMODYNE/TRANDATE) injection 10 mg  10 mg Intravenous Q4HRS PRN Hang Cutler M.D.       • ondansetron (ZOFRAN) syringe/vial injection 4 mg  4 mg Intravenous Q4HRS PRN Hang Cutler M.D.       • promethazine (PHENERGAN) tablet 12.5-25 mg  12.5-25 mg Oral Q4HRS  RAE Cutler M.D.       • promethazine (PHENERGAN) suppository 12.5-25 mg  12.5-25 mg Rectal Q4HRS PRJAMIN Cutler M.D.       • prochlorperazine (COMPAZINE) injection 5-10 mg  5-10 mg Intravenous Q4HRS PRJAMIN Cutler M.D.       • senna-docusate (PERICOLACE or SENOKOT S) 8.6-50 MG per tablet 2 Tab  2 Tab Oral BID Hang Cutler M.D.   2 Tab at 11/10/20 0600    And   • polyethylene glycol/lytes (MIRALAX) PACKET 1 Packet  1 Packet Oral QDAY PRN Hang Cutler M.D.        And   • magnesium hydroxide (MILK OF MAGNESIA) suspension 30 mL  30 mL Oral QDAY PRN Hang Cutler M.D.        And   • bisacodyl (DULCOLAX) suppository 10 mg  10 mg Rectal QDAY PRJAMIN Cutler M.D.       • insulin regular (HumuLIN R,NovoLIN R) injection  2-9 Units Subcutaneous Q6HRS Yessi Brunson D.O.   2 Units at 11/10/20 0600    And   • glucose 4 g chewable tablet 16 g  16 g Oral Q15 MIN PRN Yessi Brunson D.OJusta        And   • dextrose 50% (D50W) injection 50 mL  50 mL Intravenous Q15 MIN PRN Yessi Brunson D.OJusta           Fluids    Intake/Output Summary (Last 24 hours) at 11/10/2020 0617  Last data filed at 11/10/2020 0600  Gross per 24 hour   Intake 2724.17 ml   Output 1275 ml   Net 1449.17 ml       Laboratory          Recent Labs     11/09/20  0801 11/09/20  0939 11/09/20  1550 11/09/20  2215   SODIUM 135 134* 137 132*   POTASSIUM 4.0  --   --   --    CHLORIDE 100  --   --   --    CO2 24  --   --   --    BUN 15  --   --   --    CREATININE 0.75  --   --   --    MAGNESIUM  --  1.7  --   --    PHOSPHORUS  --  2.4*  --   --    CALCIUM 8.9  --   --   --      Recent Labs     11/09/20  0801   ALTSGPT 27   ASTSGOT 17   ALKPHOSPHAT 91   TBILIRUBIN 0.5   GLUCOSE 222*     Recent Labs     11/09/20  0801   WBC 5.9   NEUTSPOLYS 62.10   LYMPHOCYTES 26.90   MONOCYTES 7.80   EOSINOPHILS 2.40   BASOPHILS 0.50   ASTSGOT 17   ALTSGPT 27   ALKPHOSPHAT 91   TBILIRUBIN 0.5     Recent Labs     11/09/20  0801   RBC 5.67   HEMOGLOBIN 15.0    HEMATOCRIT 45.6   PLATELETCT 185   PROTHROMBTM 13.1   APTT 25.2   INR 0.96       Imaging  CT:    Reviewed    Assessment/Plan  * Acute hemorrhagic CVA (Pontine hemorrhage) (HCC)- (present on admission)  Assessment & Plan  - Hypertensive bleed  - Wean nicardipine  - Hold all anticoagulation  - Follow up MRI reults  - Q 4hr neurochecks  - PT/OT    Hypertensive emergency- (present on admission)  Assessment & Plan  - Goal SBP < 160 mm Hg  - Nicardipine on pause  - PRN labetelol  - Lisinopril added amlodipine as well    Elevated troponin- (present on admission)  Assessment & Plan  - Troponin T 27  - Likely from hypertensive emergency, hemorrhagic CVA  - Trend troponins  - TTE  - Telemetry  - No anticoagulation 2/2 hemorrhage    DESTIN (obstructive sleep apnea)  Assessment & Plan  Probable   Recent sleep study  Follow up outpatient - likely needs CPAP    If remains off gtts can likely be liberated to the floor later today.    VTE:  Contraindicated  Ulcer: Not Indicated  Lines: None    I have performed a physical exam and reviewed and updated ROS and Plan today (11/10/2020). In review of yesterday's note (11/9/2020), there are no changes except as documented above.     Discussed patient condition and risk of morbidity and/or mortality with RN, Pharmacy, Code status disscussed, Charge nurse / hot rounds and Patient

## 2020-11-10 NOTE — PROCEDURES
Technical summary: The patient underwent a split-night polysomnogram. This was a 16 channel montage study to include a 6 channel EEG, a 2 channel EOG, and chin EMG, left and right leg EMG, a snore channel, a nasal pressure transducer, and a nasal oral airflow  thermistor and a CFLOW pressure transducer.   Respiratory effort was assessed with the use of a thoracic and abdominal monitor and overnight oximetry was obtained. Audio and video recordings were reviewed. This was a fully attended study and sleep stage scoring was performed. The test was technically adequate.    Scoring Criteria: A modification of the the AASM Manual for the Scoring of Sleep and Associated Events. Obstructive apnea was scored by cessation of airflow for at least 10 seconds with continuing respiratory effort.  Central apnea was scored by cessation of airflow for at least 10 seconds with no effort.  Hypopnea was scored by a 30% or more reduction in airflow for at least 10 seconds accompanied by an arterial oxygen desaturation of 3% or more.  (For Medicare patients, hypopneas were scored by a 30% or more reduction in airflow for at least 10 seconds accompanied by an arterial oxygen saturation of 4% or more, as required by their insurance, CMS.    Interpretation:  Study start time was 10:21:57 PM. Total recording time was 2h 36.5m (156 minutes) with a total sleep time of 2h 2.5m (122 minutes) resulting in a sleep efficiency of 78.27%.  Sleep latency from the start fo the study was 28 minutes minutes and REM latency from sleep onset was 00 minutes minutes.    Respiratory:   There were 16 apneas in total consisting of 16 obstructive apneas, 0 mixed apneas, and 0 central apneas. There were 68 hypopneas in total.  The apnea index was 7.84 per hour and the hypopnea index was 33.31 per hour.  The overall AHI was 41.1, with a REM AHI of 0.00, and a supine AHI of 73.55.    Limb Movements:  There were a total of 21 periodic leg movements, of which 1 were  PLMS arousals. This resulted in a PLMS index of 10.3 and a PLMS arousal index of 0.5    Oximetry:  The mean SaO2 was 93.0% for the diagnostic portion of the study, with a minimum SaO2 of 88.0%.   Treatment:  Interpretation:  Treatment recording time was 4h 32.0m (272 minutes) with a total sleep time of 3h 27.0m (207 minutes) resulting in a sleep efficiency of 76.1%.   Sleep latency from the start of treatment was 28 minutes minutes and REM latency from sleep onset was 1h 8.0m minutes.   The patient had 35 arousals in total for an arousal index of 10.1.    Respiratory:   There were 6 apneas in total consisting of 1 obstructive apneas, 5 central apneas, and 0 mixed apneas for an apnea index of 1.74.   The patient had 35 hypopneas in total, which resulted in a hypopnea index of 10.14.   The overall AHI was 11.88, with a REM AHI of 30.32, and a supine AHI of 22.68.   12% of the events were central in naute    Limb Movements:  There were a total of 143 periodic leg movements, of which 1 were PLMS arousals. This resulted in a PLMS index of 41.4 and a PLMS arousal index of 0.3.    Oximetry:  The mean SaO2 during treatment was 94.0%, with a minimum oxygen saturation of 87.0%.    CPAP was tried from 5 to 15cm H2O. BiPAP was tried from 17/13 to  18/14cm H2O.      CPAP Titration:  Due to the significant number of obstructive respiratory events observed during the diagnostic portion of the study a CPAP titration trial was performed during the second half of the night. The CPAP pressure was initiated at 5 cm of water and the pressure was increased in an attempt to eliminate all sleep disordered breathing and snoring. CPAP was increased to 15  Cm before switching to BiPAP. The BiPAP was titrated between 17/13 cm to 18/14 cm. The lowest bet tolerate pressure was CPAP 13 cm. At this pressure the patient was not observed in the supine or REM sleep stage. The apnea hypopnea index improved to 3.53/hr with improved O2 sanjay of  91%.He  spent 1.2 min of sleep time below 89% O2 saturation Snoring was resolved.  T The CPAP was well-tolerated and there was minimal air leaks. No supplemental oxygen was required.    Impression:  1.  Severe obstructive sleep apnea with AHI of 41.1/hr and O2 sanjay 88 %. Due to severity of the disease he met the split study protocol. The titration started with CPAP 5 cm and the best tolerated was CPAP 13 cm. The AHI improved to 3.5/hr with improved O2 sanjay of 91% and average O2 saturation of 94 %.     2.  PLMS      Recommendations:  I recommend CPAP 13 cm with medium sky mask. I also recommend 30 day compliance download to assess the efficacy to the recommended pressure, measure leak, apnea hypopnea index and compliance for further outpatient monitoring and management of CPAP therapy. In some cases alternative treatment options may prove effective in resolving sleep apnea and these options include upper airway surgery, the use of a dental orthotic or weight loss and positional therapy. Clinical correlation is required. In general patients with sleep apnea are advised to avoid alcohol and sedatives and to not operate a motor vehicle while drowsy and are at a greater risk for cardiovascular disease.

## 2020-11-10 NOTE — CARE PLAN
Problem: Communication  Goal: The ability to communicate needs accurately and effectively will improve  Outcome: PROGRESSING AS EXPECTED     Problem: Safety  Goal: Will remain free from injury  Outcome: PROGRESSING AS EXPECTED  Goal: Will remain free from falls  Outcome: PROGRESSING AS EXPECTED     Problem: Venous Thromboembolism (VTW)/Deep Vein Thrombosis (DVT) Prevention:  Goal: Patient will participate in Venous Thrombosis (VTE)/Deep Vein Thrombosis (DVT)Prevention Measures  Outcome: PROGRESSING AS EXPECTED  Flowsheets  Taken 11/10/2020 1352 by Fang Juarez R.N.  SCDs, Sequential Compression Device: On  Taken 11/10/2020 0400 by Heydi Power  Mechanical Prophylaxis: SCDs, Sequential Compression Device     Problem: Bowel/Gastric:  Goal: Normal bowel function is maintained or improved  Outcome: PROGRESSING AS EXPECTED  Flowsheets (Taken 11/10/2020 0829)  Last BM: 11/06/20     Problem: Fluid Volume:  Goal: Will maintain balanced intake and output  Outcome: PROGRESSING AS EXPECTED

## 2020-11-10 NOTE — PROGRESS NOTES
2 RN skin check complete with Vamsi Seymour.  Devices in place EKG leads, BP cuff, O2 probe, SCDs PIV..  Skin assessed under devices YES.  Confirmed pressure ulcers found on N/A.  New potential pressure ulcers noted on N/A.  Abrasions found to bilateral knees from a stated recent fall. Contusion to forehead from recent fall. All other skin intact.Heels floated on pillows.  The following interventions in place Q2 hour turns, low air loss mattress, devices repositioned. Pillows for positioning and heels.

## 2020-11-10 NOTE — THERAPY
Speech Language Pathology   Clinical Swallow Evaluation     Patient Name: Joey Dawson  AGE:  55 y.o., SEX:  male  Medical Record #: [id number]  Today's Date: 11/9/2020          Assessment    Patient is 55 y.o. male with a diagnosis of CVA. CT head revealed tiny acute hemorrhage involving right/central man. Very mild slurred speech, LUE/LLE weakness and mild ataxia/dysmetria. PMHx: stroke (2014; Left PCA territory; no residual deficits), hypertension, dyslipidemia, type II DM. CXR: There is no acute cardiopulmonary process. Additional factors influencing patient status/progress: acute CVA.    Patient was seen on this date for a clinical swallow evaluation. Patient AAOx4 and speech slightly dysarthric, most notable during conversational speech. Patient denied hx of dysphagia and eager to eat. Oral motor examination WNL. PO trials were administered and consisted of the following: single ice chips x2, 4 oz MTL (tsp/cup), 4 oz pudding, 4 oz soft and regular textures in mixed consistency form (fruit cup), ~20 oz thin liquids (tsp/cup/single and consecutive w/ straw), and saltine crackers x3. Onset of swallow trigger was timely and laryngeal elevation complete to palpation. NO overt s/sx of aspiration across all trials tested. NO changes in vocal quality. Pt denied difficulty swallowing or globus sensation. Education provided to pt regarding current status and SLP recs.      Plan    Patient appears at the level to initiate a regular (diabetic)/thin liquid diet. SLP following closely to ensure diet tolerance and complete orders for cognitive-linguistic evaluation as appropriate.     Recommend Speech Therapy 5 times per week until therapy goals are met for the following treatments:  Dysphagia Training, Expression Training and Patient / Family / Caregiver Education.    Discharge Recommendations: Recommend home health for continued speech therapy services       Objective       11/09/20 1546   Vitals   O2 Delivery Device None  - Room Air   Oral Motor Eval    Is Patient Able to Complete Oral Motor Eval Yes, Within Normal Limits   Laryngeal Function   Voice Quality Within Functional Limits   Volutional Cough Within Functional Limits   Excursion Upon Swallow Complete   Oral Food Presentation   Ice Chips Within Functional Limits   Single Swallow Mildly Thick (2) - (Nectar Thick)  Within Functional Limits   Single Swallow Thin (0) Within Functional Limits   Serial Swallow Thin (0) Within Functional Limits   Pureed (4) Within Functional Limits   Soft & Bite-Sized (6) - (Dysphagia III) Within Functional Limits   Regular (7) Within Functional Limits   Self Feeding Independent   Dysphagia Strategies / Recommendations   Compensatory Strategies Monitor During Meals;Head of Bed 90 Degrees During Eating / Drinking;Single Sips / Bites   Diet / Liquid Recommendation Regular (7);Thin (0)   Medication Administration  Whole with Liquid Wash   Therapy Interventions Dysphagia Therapy By Speech Language Pathologist   Short Term Goals   Short Term Goal # 1 Patient will consume a regular/thin liquid diet with no overt s/sx of aspiration given min cues to swallow strategies.

## 2020-11-11 LAB
ANION GAP SERPL CALC-SCNC: 12 MMOL/L (ref 7–16)
BUN SERPL-MCNC: 12 MG/DL (ref 8–22)
CALCIUM SERPL-MCNC: 8.8 MG/DL (ref 8.5–10.5)
CHLORIDE SERPL-SCNC: 104 MMOL/L (ref 96–112)
CO2 SERPL-SCNC: 24 MMOL/L (ref 20–33)
CREAT SERPL-MCNC: 0.8 MG/DL (ref 0.5–1.4)
GLUCOSE BLD-MCNC: 175 MG/DL (ref 65–99)
GLUCOSE BLD-MCNC: 178 MG/DL (ref 65–99)
GLUCOSE BLD-MCNC: 190 MG/DL (ref 65–99)
GLUCOSE BLD-MCNC: 282 MG/DL (ref 65–99)
GLUCOSE SERPL-MCNC: 176 MG/DL (ref 65–99)
MAGNESIUM SERPL-MCNC: 2.2 MG/DL (ref 1.5–2.5)
POTASSIUM SERPL-SCNC: 4.1 MMOL/L (ref 3.6–5.5)
SODIUM SERPL-SCNC: 140 MMOL/L (ref 135–145)

## 2020-11-11 PROCEDURE — 83735 ASSAY OF MAGNESIUM: CPT

## 2020-11-11 PROCEDURE — 700102 HCHG RX REV CODE 250 W/ 637 OVERRIDE(OP): Performed by: HOSPITALIST

## 2020-11-11 PROCEDURE — 99232 SBSQ HOSP IP/OBS MODERATE 35: CPT | Performed by: HOSPITALIST

## 2020-11-11 PROCEDURE — 97165 OT EVAL LOW COMPLEX 30 MIN: CPT

## 2020-11-11 PROCEDURE — 80048 BASIC METABOLIC PNL TOTAL CA: CPT

## 2020-11-11 PROCEDURE — A9270 NON-COVERED ITEM OR SERVICE: HCPCS | Performed by: HOSPITALIST

## 2020-11-11 PROCEDURE — 770001 HCHG ROOM/CARE - MED/SURG/GYN PRIV*

## 2020-11-11 PROCEDURE — 97161 PT EVAL LOW COMPLEX 20 MIN: CPT

## 2020-11-11 PROCEDURE — 700102 HCHG RX REV CODE 250 W/ 637 OVERRIDE(OP): Performed by: INTERNAL MEDICINE

## 2020-11-11 PROCEDURE — A9270 NON-COVERED ITEM OR SERVICE: HCPCS | Performed by: PSYCHIATRY & NEUROLOGY

## 2020-11-11 PROCEDURE — 99232 SBSQ HOSP IP/OBS MODERATE 35: CPT | Performed by: NURSE PRACTITIONER

## 2020-11-11 PROCEDURE — A9270 NON-COVERED ITEM OR SERVICE: HCPCS | Performed by: INTERNAL MEDICINE

## 2020-11-11 PROCEDURE — 700102 HCHG RX REV CODE 250 W/ 637 OVERRIDE(OP): Performed by: PSYCHIATRY & NEUROLOGY

## 2020-11-11 PROCEDURE — 82962 GLUCOSE BLOOD TEST: CPT | Mod: 91

## 2020-11-11 RX ORDER — CARVEDILOL 12.5 MG/1
6.25 TABLET ORAL 2 TIMES DAILY WITH MEALS
Status: DISCONTINUED | OUTPATIENT
Start: 2020-11-11 | End: 2020-11-12 | Stop reason: HOSPADM

## 2020-11-11 RX ORDER — INSULIN GLARGINE 100 [IU]/ML
18 INJECTION, SOLUTION SUBCUTANEOUS EVERY EVENING
Status: DISCONTINUED | OUTPATIENT
Start: 2020-11-11 | End: 2020-11-12 | Stop reason: HOSPADM

## 2020-11-11 RX ADMIN — PRAVASTATIN SODIUM 40 MG: 20 TABLET ORAL at 17:54

## 2020-11-11 RX ADMIN — SERTRALINE HYDROCHLORIDE 50 MG: 50 TABLET ORAL at 17:53

## 2020-11-11 RX ADMIN — AMLODIPINE BESYLATE 10 MG: 10 TABLET ORAL at 04:51

## 2020-11-11 RX ADMIN — LISINOPRIL 40 MG: 20 TABLET ORAL at 17:54

## 2020-11-11 RX ADMIN — INSULIN HUMAN 5 UNITS: 100 INJECTION, SOLUTION PARENTERAL at 11:51

## 2020-11-11 RX ADMIN — INSULIN HUMAN 2 UNITS: 100 INJECTION, SOLUTION PARENTERAL at 04:49

## 2020-11-11 RX ADMIN — DOCUSATE SODIUM 50 MG AND SENNOSIDES 8.6 MG 2 TABLET: 8.6; 5 TABLET, FILM COATED ORAL at 04:51

## 2020-11-11 RX ADMIN — INSULIN HUMAN 2 UNITS: 100 INJECTION, SOLUTION PARENTERAL at 00:42

## 2020-11-11 RX ADMIN — INSULIN GLARGINE 18 UNITS: 100 INJECTION, SOLUTION SUBCUTANEOUS at 17:53

## 2020-11-11 RX ADMIN — CARVEDILOL 6.25 MG: 12.5 TABLET, FILM COATED ORAL at 09:16

## 2020-11-11 RX ADMIN — CARVEDILOL 6.25 MG: 12.5 TABLET, FILM COATED ORAL at 16:42

## 2020-11-11 RX ADMIN — INSULIN HUMAN 2 UNITS: 100 INJECTION, SOLUTION PARENTERAL at 17:53

## 2020-11-11 ASSESSMENT — COGNITIVE AND FUNCTIONAL STATUS - GENERAL
MOVING FROM LYING ON BACK TO SITTING ON SIDE OF FLAT BED: A LITTLE
DAILY ACTIVITIY SCORE: 24
MOBILITY SCORE: 21
SUGGESTED CMS G CODE MODIFIER DAILY ACTIVITY: CH
CLIMB 3 TO 5 STEPS WITH RAILING: A LITTLE
SUGGESTED CMS G CODE MODIFIER MOBILITY: CJ
WALKING IN HOSPITAL ROOM: A LITTLE

## 2020-11-11 ASSESSMENT — ACTIVITIES OF DAILY LIVING (ADL): TOILETING: INDEPENDENT

## 2020-11-11 ASSESSMENT — ENCOUNTER SYMPTOMS
FOCAL WEAKNESS: 1
CHILLS: 0
SHORTNESS OF BREATH: 0
NAUSEA: 0
FEVER: 0
VOMITING: 0

## 2020-11-11 ASSESSMENT — GAIT ASSESSMENTS
GAIT LEVEL OF ASSIST: SUPERVISED
DISTANCE (FEET): 150
DEVIATION: SHUFFLED GAIT;DECREASED HEEL STRIKE;DECREASED TOE OFF

## 2020-11-11 ASSESSMENT — PAIN DESCRIPTION - PAIN TYPE: TYPE: ACUTE PAIN

## 2020-11-11 ASSESSMENT — FIBROSIS 4 INDEX: FIB4 SCORE: 1

## 2020-11-11 NOTE — PROGRESS NOTES
Pontine hemorrhage likely 2/2 to HTN. No new issues. Goal normotension prior to discharge. PT/OT. No ICU needs. CC to sign off. Signed out to hospitalist.

## 2020-11-11 NOTE — THERAPY
Missed Therapy     Patient Name: Joey Dawson  Age:  55 y.o., Sex:  male  Medical Record #: [id number]  Today's Date: 11/11/2020 11/11/20 0823   Treatment Variance   Reason For Missed Therapy Non-Medical - Other (Please Comment)   Interdisciplinary Plan of Care Collaboration   IDT Collaboration with  Nurse Practitioner   Collaboration Comments Orders received for a cognitive-linguistic evaluation. Spoke with neurology APRN - dysarthria has resolved and cognitive evaluation is currently not indicated. SLP signed off yesterday for dysphagia tx. Please re-consult with any change in status or concerns for new dysphagia or cognitive deficits.

## 2020-11-11 NOTE — THERAPY
Occupational Therapy   Initial Evaluation     Patient Name: Joey Dawson  Age:  55 y.o., Sex:  male  Medical Record #: [id number]  Today's Date: 11/11/2020     Precautions  Precautions: Swallow Precautions ( See Comments)    Assessment  Patient is 55 y.o. male with a diagnosis of pontine hemorrhage 2/2 HTN emergency. Pt was able to perform basic self care tasks, functional mobility and t/f's with supervision/MI, no lob noted. Pt's BUE strength, ROM, sensation and coordination intact and equal bilaterally, denies any vision changes. Pt appears to be back to his baseline and doesn't demonstrate the need for further acute OT needs at this time.     Plan    Recommend Occupational Therapy for Evaluation only     DC Equipment Recommendations: None  Discharge Recommendations: Anticipate that the patient will have no further occupational therapy needs after discharge from the hospital     Objective       11/11/20 1032   Prior Living Situation   Prior Services None   Housing / Facility 1 Story House   Steps Into Home 3   Steps In Home 0   Bathroom Set up Bathtub / Shower Combination   Equipment Owned None   Lives with - Patient's Self Care Capacity Parents;Other (Comments)  (niece and her family)   Comments I with ADLs/IADLs family. Family able to assist as needed upon d/c    Prior Level of ADL Function   Self Feeding Independent   Grooming / Hygiene Independent   Bathing Independent   Dressing Independent   Toileting Independent   Prior Level of IADL Function   Medication Management Independent   Laundry Independent   Kitchen Mobility Independent   Finances Independent   Home Management Independent   Shopping Independent   Prior Level Of Mobility Independent Without Device in Community   Driving / Transportation Driving Independent   Occupation (Pre-Hospital Vocational) Other (Comments)  (not working d/t pandemic)   Balance Assessment   Sitting Balance (Static) Good   Sitting Balance (Dynamic) Good   Standing Balance  (Static) Fair +   Standing Balance (Dynamic) Fair   Weight Shift Sitting Good   Weight Shift Standing Fair   Comments w/o AD, no lob noted   ADL Assessment   Eating Independent   Grooming Supervision;Standing  (sink side oral care)   Bathing   (discussd home s/u, and modifcations as needed)   Upper Body Dressing Supervision   Lower Body Dressing Supervision   Toileting Supervision   Functional Mobility   Sit to Stand Supervised   Bed, Chair, Wheelchair Transfer Supervised   Toilet Transfers Supervised   Comments w/o AD   Anticipated Discharge Equipment and Recommendations   DC Equipment Recommendations None

## 2020-11-11 NOTE — PROGRESS NOTES
Chief Complaint   Patient presents with   • Weakness     left sided   • Headache       Problem List Items Addressed This Visit     * (Principal) Acute hemorrhagic CVA (Pontine hemorrhage) (HCC)     - Hypertensive bleed  - Wean nicardipine  - Hold all anticoagulation  - Follow up MRI reults  - Q 4hr neurochecks  - PT/OT         Relevant Orders    REFERRAL TO PHYSIATRY (PMR)    REFERRAL TO NEUROLOGY      Other Visit Diagnoses     Uncontrolled hypertension        Relevant Medications    lisinopril (PRINIVIL) tablet 40 mg    pravastatin (PRAVACHOL) tablet 40 mg    cloNIDine (CATAPRES) tablet 0.1 mg    labetalol (NORMODYNE/TRANDATE) injection 10-20 mg    amLODIPine (NORVASC) tablet 10 mg    hydrALAZINE (APRESOLINE) tablet 25 mg    carvedilol (COREG) tablet 6.25 mg    Left-sided weakness        Ataxia            Neurology Stroke Progress Note    History of present illness:  This is a 55-year old male with PMhx significant for stroke (2014; Left PCA territory; no residual deficits), hypertension, dyslipidemia, type II DM who presented to [facility] on 11/9/20 for a chief complaint of mild left sided weakness, slurred speech and Right sided headache. The patient states that last night, he had a sleep study; he awoke this morning feeling well, around 0630. At 0650, he was taking a shower when he noted sudden onset of the above noted symptoms. EMS was called; on scene, ; BG WNL. At time of presentation here, NIHSS 3-4; notably had mild ataxia per exam as well. STAT CT head revealed tiny acute hemorrhage involving right/central man; likely hypertensive etiology given hypertensive crisis on arrival. ICH score 1.   Currently, patient is sitting up in stretcher; awake and alert. Still with very mild slurred speech, LUE/LLE weakness and mild ataxia/dysmetria. He admits to mild persistent Right sided headache as well. He denies dizziness, numbness, paresthesia, problem with vision or problem swallowing.      Neurology has been consulted by Dr. Yessi Brunson to further evaluate the findings noted above.      Interval, 11/10/20:   Patient is sitting up in bed; awake and alert. States that he feels well. Still with mild LUE ataxia and dysarthria. Headache to top of head. No events overnight; SBP 130s-150s.     Patient s/p MRI Brain this morning; confirms Right pontine hemorrhage; notably, GRE with several other areas of chronic appearing/old hemorrhage; all likely hypertensive given poorly controlled blood pressure.     Interval, 11/11/20:   Patient sitting up in bed; awake and alert. Feels well, speech improved and near normal now. Awaiting floor transfer. No events overnight. SBP 130s.    No changes to HPI as was previously documented.      Past medical history:   Past Medical History:   Diagnosis Date   • Hyperlipidemia    • Hypertension    • Stroke (HCC)    • Type II or unspecified type diabetes mellitus without mention of complication, not stated as uncontrolled        Past surgical history:   History reviewed. No pertinent surgical history.    Family history:   Family History   Problem Relation Age of Onset   • Cancer Mother         Breast cancer   • Heart Disease Father         CHF       Social history:   Social History     Socioeconomic History   • Marital status: Single     Spouse name: Not on file   • Number of children: Not on file   • Years of education: Not on file   • Highest education level: Not on file   Occupational History   • Not on file   Social Needs   • Financial resource strain: Not on file   • Food insecurity     Worry: Not on file     Inability: Not on file   • Transportation needs     Medical: Not on file     Non-medical: Not on file   Tobacco Use   • Smoking status: Never Smoker   • Smokeless tobacco: Never Used   Substance and Sexual Activity   • Alcohol use: Yes     Alcohol/week: 1.2 oz     Types: 2 Cans of beer per week     Comment: yearly   • Drug use: No   • Sexual activity: Yes    Lifestyle   • Physical activity     Days per week: Not on file     Minutes per session: Not on file   • Stress: Not on file   Relationships   • Social connections     Talks on phone: Not on file     Gets together: Not on file     Attends [employer] service: Not on file     Active member of club or organization: Not on file     Attends meetings of clubs or organizations: Not on file     Relationship status: Not on file   • Intimate partner violence     Fear of current or ex partner: Not on file     Emotionally abused: Not on file     Physically abused: Not on file     Forced sexual activity: Not on file   Other Topics Concern   • Not on file   Social History Narrative   • Not on file       Current medications:   Current Facility-Administered Medications   Medication Dose   • carvedilol (COREG) tablet 6.25 mg  6.25 mg   • insulin glargine (Lantus) injection  18 Units   • labetalol (NORMODYNE/TRANDATE) injection 10-20 mg  10-20 mg   • amLODIPine (NORVASC) tablet 10 mg  10 mg   • hydrALAZINE (APRESOLINE) tablet 25 mg  25 mg   • lisinopril (PRINIVIL) tablet 40 mg  40 mg   • pravastatin (PRAVACHOL) tablet 40 mg  40 mg   • sertraline (Zoloft) tablet 50 mg  50 mg   • Respiratory Therapy Consult     • ondansetron (ZOFRAN ODT) dispertab 4 mg  4 mg   • acetaminophen (Tylenol) tablet 650 mg  650 mg    Or   • acetaminophen (TYLENOL) suppository 650 mg  650 mg   • MD Alert...ICU Electrolyte Replacement per Pharmacy     • cloNIDine (CATAPRES) tablet 0.1 mg  0.1 mg   • ondansetron (ZOFRAN) syringe/vial injection 4 mg  4 mg   • promethazine (PHENERGAN) tablet 12.5-25 mg  12.5-25 mg   • promethazine (PHENERGAN) suppository 12.5-25 mg  12.5-25 mg   • prochlorperazine (COMPAZINE) injection 5-10 mg  5-10 mg   • senna-docusate (PERICOLACE or SENOKOT S) 8.6-50 MG per tablet 2 Tab  2 Tab    And   • polyethylene glycol/lytes (MIRALAX) PACKET 1 Packet  1 Packet    And   • magnesium hydroxide (MILK OF MAGNESIA) suspension 30 mL  30 mL    And    • bisacodyl (DULCOLAX) suppository 10 mg  10 mg   • insulin regular (HumuLIN R,NovoLIN R) injection  2-9 Units    And   • glucose 4 g chewable tablet 16 g  16 g    And   • dextrose 50% (D50W) injection 50 mL  50 mL       Medication Allergy:  Allergies   Allergen Reactions   • Food      Spicy cinnamon--sores in mouth and tongue swelling       Review of systems:   Constitutional: denies fever, night sweats, weight loss.   Eyes: denies acute vision change, eye pain or secretion.   Ears, Nose, Mouth, Throat: denies nasal secretion, nasal bleeding, difficulty swallowing, hearing loss, tinnitus, vertigo, ear pain, acute dental problems, oral ulcers or lesions.   Endocrine: denies recent weight changes, heat or cold intolerance, polyuria, polydypsia, polyphagia,abnormal hair growth.  Cardiovascular: denies new onset of chest pain, palpitations, syncope, or dyspnea of exertion.  Pulmonary: denies shortness of breath, new onset of cough, hemoptysis, wheezing, chest pain or flu-like symptoms.   GI: denies nausea, vomiting, diarrhea, GI bleeding, change in appetite, abdominal pain, and change in bowel habits.  : denies dysuria, urinary incontinence, hematuria.  Heme/oncology: denies history of easy bruising or bleeding. No history of cancer, DVTor PE.  Allergy/immunology: denies hives/urticaria, or itching.   Dermatologic: denies new rash, or new skin lesions.  Musculoskeletal:denies joint swelling or pain, muscle pain, neck and back pain.   Neurologic: As noted above.   Psychiatric: denies symptoms of depression, anxiety, hallucinations, mood swings or changes, suicidal or homicidal thoughts.     Physical examination:   Vitals:    11/11/20 0700 11/11/20 0800 11/11/20 0900 11/11/20 1000   BP: 129/72 119/69 128/71 137/66   Pulse: 61 (!) 58 77 76   Resp: 14 15 (!) 36 (!) 43   Temp:  36.7 °C (98 °F)  36.6 °C (97.8 °F)   TempSrc:  Temporal  Temporal   SpO2: 93% 93% 96% 92%   Weight:    109.7 kg (241 lb 13.5 oz)   Height:          General: Patient in no acute distress, pleasant and cooperative.  HEENT: Normocephalic, no signs of acute trauma.   Neck: supple, no meningeal signs or carotid bruits. There is normal range of motion. No tenderness on exam.   Chest: clear to auscultation. No cough.   CV: RRR, no murmurs.   Skin: no signs of acute rashes or trauma.   Musculoskeletal: joints exhibit full range of motion, without any pain to palpation. There are no signs of joint or muscle swelling. There is no tenderness to deep palpation of muscles.   Psychiatric: No hallucinatory behavior.      NEUROLOGICAL EXAM:   Mental status, orientation: Awake, alert and fully oriented.   Speech and language: speech is fluent, very mildly dysarthric. The patient is able to name, repeat and comprehend.   Memory: There is intact recollection of recent and remote events.   Cranial nerve exam: Pupils are 3-4 mm bilaterally and equally reactive to light and accommodation. Visual fields are intact by confrontation.There is no nystagmus on primary or secondary gaze. Intact full EOM in all directions of gaze. Face appears symmetric. Sensation in the face is intact to light touch. Uvula is midline. Palate elevates symmetrically. Tongue is midline and without any signs of tongue biting or fasciculations. Sternocleidomastoid muscles exhibit is normal strength bilaterally. Shoulder shrug is intact bilaterally.   Motor exam: Strength is 5/5 in RUE/RLE; 4+/5 to LUE/LLE. Tone is normal. No abnormal movements were seen on exam.   Sensory exam reveals normal sense of light touch and pinprick in all extremities.   Deep tendon reflexes:  2+ throughout. Plantar responses are flexor. There is no clonus.   Coordination: mild LUE dysmetria appreciated.    Gait: Not assessed at this time as patient is a fall risk.     No significant changes to exam as was documented on 11/10/20.     ICH SCORE: 1    ANCILLARY DATA REVIEWED:     Lab Data Review:  Recent Results (from the past 24  hour(s))   ACCU-CHEK GLUCOSE    Collection Time: 11/10/20 12:16 PM   Result Value Ref Range    Glucose - Accu-Ck 218 (H) 65 - 99 mg/dL   ACCU-CHEK GLUCOSE    Collection Time: 11/10/20  6:20 PM   Result Value Ref Range    Glucose - Accu-Ck 187 (H) 65 - 99 mg/dL   ACCU-CHEK GLUCOSE    Collection Time: 11/11/20 12:40 AM   Result Value Ref Range    Glucose - Accu-Ck 190 (H) 65 - 99 mg/dL   Magnesium    Collection Time: 11/11/20  3:50 AM   Result Value Ref Range    Magnesium 2.2 1.5 - 2.5 mg/dL   BASIC METABOLIC PANEL    Collection Time: 11/11/20  3:50 AM   Result Value Ref Range    Sodium 140 135 - 145 mmol/L    Potassium 4.1 3.6 - 5.5 mmol/L    Chloride 104 96 - 112 mmol/L    Co2 24 20 - 33 mmol/L    Glucose 176 (H) 65 - 99 mg/dL    Bun 12 8 - 22 mg/dL    Creatinine 0.80 0.50 - 1.40 mg/dL    Calcium 8.8 8.5 - 10.5 mg/dL    Anion Gap 12.0 7.0 - 16.0   ESTIMATED GFR    Collection Time: 11/11/20  3:50 AM   Result Value Ref Range    GFR If African American >60 >60 mL/min/1.73 m 2    GFR If Non African American >60 >60 mL/min/1.73 m 2   ACCU-CHEK GLUCOSE    Collection Time: 11/11/20  4:48 AM   Result Value Ref Range    Glucose - Accu-Ck 175 (H) 65 - 99 mg/dL       Labs reviewed by me.       Imaging reviewed by me:     MR-BRAIN-W/O   Final Result      1.  There is an approximately 13 x 16 mm sized acute right midbrain/cerebral peduncle hemorrhage.   2.  There are areas of small chronic hemorrhages in the basal ganglia and right medial parietal lobe.   3.  Large encephalomalacia with chronic hemorrhagic products in the left occipital lobe.   4.  Multiple chronic small infarcts.   5.  Mild chronic microvascular ischemic disease.   6.  Mild cerebral volume loss.      EC-ECHOCARDIOGRAM COMPLETE W/ CONT   Final Result      DX-CHEST-PORTABLE (1 VIEW)   Final Result      1.  There is no acute cardiopulmonary process.      CT-CTA NECK WITH & W/O-POST PROCESSING   Final Result      1.  CT angiogram of the neck demonstrating no  significant carotid artery stenosis or occlusion.   2.  There is a mildly enlarged heterogeneous thyroid gland with multiple thyroid nodules which could be further assessed on a nonemergent basis with thyroid ultrasound.      CT-CEREBRAL PERFUSION ANALYSIS   Final Result      1.  Cerebral blood flow less than 30% likely representing completed infarct = 11 mL.      2.  T Max more than 6 seconds likely representing combination of completed infarct and ischemia = 23 mL.      3.  Mismatched volume likely representing ischemic brain/penumbra = 12 mL      4.  Please note that the cerebral perfusion was performed on the limited brain tissue around the basal ganglia region. Infarct/ischemia outside the CT perfusion sections can be missed in this study.      CT-CTA HEAD WITH & W/O-POST PROCESS   Final Result      1.  CT angiogram of the [place] demonstrate no significant arterial stenosis or proximal occlusion.      CT-HEAD W/O   Final Result      1.  There is a focal area of increased attenuation in the right man most consistent with a small area of hemorrhage.   2.  There is an old infarct in the left posterior medial occipital lobe.          Modified Elton Scale (MRS): 0 = No symptoms      ASSESSMENT AND PLAN:  55-year old male with PMhx significant for stroke (2014; Left PCA territory; no residual deficits), hypertension, dyslipidemia, type II DM who presented to [facility] on 11/9/20 for a chief complaint of mild left sided weakness, slurred speech and Right sided headache; sudden onset, 20-30 minutes after waking. CT head revealed small Right pontine hemorrhage; likely hypertensive etiology given SBP 200s on arrival. ICH score 1. Patient continues to do well; SBP now maintained 130s; deficits improving, now with only mild dysarthria and ataxia.     Recommendations/Plan:     -q4h and PRN neuro assessment. VS per nursing/unit protocol.   -Goal SBP <150; Antihypertensives per primary.   -Telemetry;  currently SR. TTE with EF 60%; no gross structural abnormalities.   -Avoid all anticoagulation/antithrombotics at this time.   -Maintain strict euthermia, euglycemia, eunatremia; current Na is 140.   -PT/OT/SLP eval and treat. Physiatry consult.   -Will defer all other medical management to primary team at this time.   -DVT PPX: SCDs.     The plan of care above has been discussed with Dr. Frausto. Other than the above, no further recommendations from a neurological perspective. Please call with questions.     MIRANDA Perkins.RISH.  [facility]

## 2020-11-11 NOTE — CARE PLAN
Problem: Safety  Goal: Will remain free from injury  11/11/2020 0645 by Jann Hyman R.N.  Outcome: PROGRESSING AS EXPECTED  11/10/2020 2108 by Jann Hyman R.N.  Outcome: PROGRESSING AS EXPECTED  Goal: Will remain free from falls  11/11/2020 0645 by Jann Hyman R.N.  Outcome: PROGRESSING AS EXPECTED  11/10/2020 2108 by Jann Hyman R.N.  Outcome: PROGRESSING AS EXPECTED     Problem: Pain Management  Goal: Pain level will decrease to patient's comfort goal  11/11/2020 0645 by Jann Hyman R.N.  Outcome: PROGRESSING AS EXPECTED  11/10/2020 2108 by Jann Hyman R.N.  Outcome: PROGRESSING AS EXPECTED     Problem: Skin Integrity  Goal: Risk for impaired skin integrity will decrease  11/11/2020 0645 by Jann Hyman R.N.  Outcome: PROGRESSING AS EXPECTED  11/10/2020 2108 by Jann Hyman R.N.  Outcome: PROGRESSING AS EXPECTED

## 2020-11-11 NOTE — THERAPY
Physical Therapy   Initial Evaluation     Patient Name: Joey Dawson  Age:  55 y.o., Sex:  male  Medical Record #: [id number]  Today's Date: 11/11/2020     Precautions: Swallow Precautions ( See Comments)    Assessment  Patient is 55 y.o. male admitted with acute pontine hemorrhage due to hypertensive emergency. Pt initially with L sided weakness and slurred speech but reported felling back to baseline. No strength or sensation deficits noted. Pt completed transfers with SPV. Pt initially unsteady when ambulating likely due to being on bed rest for 2 days. Pt improved safety with increased distance. No further acute PT needs, anticipate pt will return to prior level by walking with staff 3x/day until medically cleared for discharge.  Patient will not be actively followed for physical therapy services at this time, however may be seen if requested by physician for 1 more visit within 30 days to address any discharge or equipment needs    Plan    Recommend Physical Therapy for Evaluation only for the following treatments:  dc needs    DC Equipment Recommendations: None  Discharge Recommendations: Anticipate that the patient will have no further physical therapy needs after discharge from the hospital          11/11/20 1042   Prior Living Situation   Prior Services None   Housing / Facility 1 Story House   Steps Into Home 3   Steps In Home 0   Equipment Owned None   Lives with - Patient's Self Care Capacity Parents;Other (Comments)  (other family)   Comments pt lives with his mother and other family members. Pt works in sales   Prior Level of Functional Mobility   Bed Mobility Independent   Transfer Status Independent   Ambulation Independent   Distance Ambulation (Feet)   (community)   Assistive Devices Used None   Stairs Independent   Comments independent and working   Gait Analysis   Gait Level Of Assist Supervised   Assistive Device None   Distance (Feet) 150   # of Times Distance was Traveled 1   Deviation  Shuffled Gait;Decreased Heel Strike;Decreased Toe Off   # of Stairs Climbed 0   Weight Bearing Status fwb   Comments initially pt unsteady as he was on bed rest for 2 days. Pt improved safety with increased distance. Anticipate pt will return to baseline by ambulating with staff 3x/day   Bed Mobility    Supine to Sit   (NT in chair)   Sit to Supine   (NT in chair)   Functional Mobility   Sit to Stand Supervised   Bed, Chair, Wheelchair Transfer Supervised   Mobility on unit with no AD   Anticipated Discharge Equipment and Recommendations   DC Equipment Recommendations None   Discharge Recommendations Anticipate that the patient will have no further physical therapy needs after discharge from the hospital

## 2020-11-12 VITALS
OXYGEN SATURATION: 93 % | HEART RATE: 71 BPM | RESPIRATION RATE: 43 BRPM | TEMPERATURE: 97.8 F | DIASTOLIC BLOOD PRESSURE: 80 MMHG | WEIGHT: 241.84 LBS | BODY MASS INDEX: 36.65 KG/M2 | HEIGHT: 68 IN | SYSTOLIC BLOOD PRESSURE: 157 MMHG

## 2020-11-12 PROBLEM — R79.89 ELEVATED TROPONIN: Status: RESOLVED | Noted: 2020-11-09 | Resolved: 2020-11-12

## 2020-11-12 LAB
GLUCOSE BLD-MCNC: 157 MG/DL (ref 65–99)
GLUCOSE BLD-MCNC: 168 MG/DL (ref 65–99)
GLUCOSE BLD-MCNC: 194 MG/DL (ref 65–99)
GLUCOSE BLD-MCNC: 209 MG/DL (ref 65–99)

## 2020-11-12 PROCEDURE — A9270 NON-COVERED ITEM OR SERVICE: HCPCS | Performed by: INTERNAL MEDICINE

## 2020-11-12 PROCEDURE — 700102 HCHG RX REV CODE 250 W/ 637 OVERRIDE(OP): Performed by: INTERNAL MEDICINE

## 2020-11-12 PROCEDURE — 82962 GLUCOSE BLOOD TEST: CPT | Mod: 91

## 2020-11-12 PROCEDURE — 700102 HCHG RX REV CODE 250 W/ 637 OVERRIDE(OP): Performed by: HOSPITALIST

## 2020-11-12 PROCEDURE — A9270 NON-COVERED ITEM OR SERVICE: HCPCS | Performed by: PSYCHIATRY & NEUROLOGY

## 2020-11-12 PROCEDURE — 99239 HOSP IP/OBS DSCHRG MGMT >30: CPT | Performed by: HOSPITALIST

## 2020-11-12 PROCEDURE — A9270 NON-COVERED ITEM OR SERVICE: HCPCS | Performed by: HOSPITALIST

## 2020-11-12 PROCEDURE — 700102 HCHG RX REV CODE 250 W/ 637 OVERRIDE(OP): Performed by: PSYCHIATRY & NEUROLOGY

## 2020-11-12 RX ORDER — AMLODIPINE BESYLATE 10 MG/1
10 TABLET ORAL DAILY
Qty: 30 TAB | Refills: 0 | Status: SHIPPED | OUTPATIENT
Start: 2020-11-13 | End: 2021-02-16 | Stop reason: SDUPTHER

## 2020-11-12 RX ORDER — HYDROCHLOROTHIAZIDE 12.5 MG/1
12.5 CAPSULE, GELATIN COATED ORAL DAILY
Qty: 30 CAP | Refills: 0 | Status: SHIPPED | OUTPATIENT
Start: 2020-11-12 | End: 2021-08-07

## 2020-11-12 RX ADMIN — DOCUSATE SODIUM 50 MG AND SENNOSIDES 8.6 MG 2 TABLET: 8.6; 5 TABLET, FILM COATED ORAL at 05:27

## 2020-11-12 RX ADMIN — INSULIN HUMAN 2 UNITS: 100 INJECTION, SOLUTION PARENTERAL at 05:29

## 2020-11-12 RX ADMIN — INSULIN HUMAN 3 UNITS: 100 INJECTION, SOLUTION PARENTERAL at 12:51

## 2020-11-12 RX ADMIN — AMLODIPINE BESYLATE 10 MG: 10 TABLET ORAL at 05:27

## 2020-11-12 RX ADMIN — INSULIN HUMAN 2 UNITS: 100 INJECTION, SOLUTION PARENTERAL at 00:14

## 2020-11-12 RX ADMIN — CARVEDILOL 6.25 MG: 12.5 TABLET, FILM COATED ORAL at 08:13

## 2020-11-12 ASSESSMENT — PAIN DESCRIPTION - PAIN TYPE
TYPE: ACUTE PAIN
TYPE: ACUTE PAIN

## 2020-11-12 NOTE — DISCHARGE SUMMARY
Discharge Summary    CHIEF COMPLAINT ON ADMISSION  Chief Complaint   Patient presents with   • Weakness     left sided   • Headache       Reason for Admission  EMS     Admission Date  11/9/2020    CODE STATUS  Full Code    HPI & HOSPITAL COURSE  This is a 55 y.o. male here with hypertensive emergency and intracranial hemorrhage.  He was admitted to the intensive care unit and evaluated by neurology and critical care.  He was started on nicardipine drip and his blood pressure meds were adjusted.  He has been weaned off drips and has been started on amlodipine and hydrochlorothiazide.  Patient is clinically improved his left-sided weakness is pretty much resolved he is tolerating his diet.  He has been cleared by speech therapy occupational physical therapy for discharge with no further needs recommended.  Reviewed with the patient importance of close follow-up with his PCP for blood pressure recheck next week.  We will keep a written blood pressure log.  He was incidentally noted to have thyroid nodules on CT of the neck and will need follow-up thyroid ultrasound    Therefore, he is discharged in good and stable condition to home with close outpatient follow-up.    The patient met 2-midnight criteria for an inpatient stay at the time of discharge.    Discharge Date  11/12/2020    FOLLOW UP ITEMS POST DISCHARGE  Follow-up with PCP for blood pressure recheck  Patient will need follow-up chemistry panel on his electrolytes since he is starting hydrochlorothiazide  [facility] with [facility]  Follow-up on thyroid nodules noted on CTA of neck    DISCHARGE DIAGNOSES  Principal Problem:    Acute hemorrhagic CVA (Pontine hemorrhage) ([facility]) POA: Yes  Active Problems:    DESTIN (obstructive sleep apnea) POA: Unknown    DM2 (diabetes mellitus, type 2) ([facility]) POA: Yes    Obesity (BMI 30-39.9) POA: Yes  Resolved Problems:    Hypertensive emergency POA: Yes    Elevated troponin POA: Yes      FOLLOW UP  Future Appointments  "  Date Time Provider Department [facility]   12/2/2020  2:40 PM [facility] None   1/7/2021  4:30 PM JEEVAN Purvis [facility] JEEVAN Elias  [address] [place] [address]  [phone]    In 1 week        MEDICATIONS ON DISCHARGE     Medication List      START taking these medications      Instructions   amLODIPine 10 MG Tabs  Start taking on: November 13, 2020  Commonly known as: NORVASC   Take 1 Tab by mouth every day.  Dose: 10 mg     hydrochlorothiazide 12.5 MG capsule  Commonly known as: MICROZIDE   Take 1 Cap by mouth every day.  Dose: 12.5 mg        CHANGE how you take these medications      Instructions   metFORMIN  MG Tb24  What changed: additional instructions  Commonly known as: GLUCOPHAGE XR   Take 2 Tabs by mouth 2 times a day.  Dose: 1,000 mg     pravastatin 40 MG tablet  What changed: when to take this  Commonly known as: PRAVACHOL   Take 1 Tab by mouth every day.  Dose: 40 mg     sertraline 50 MG Tabs  What changed: when to take this  Commonly known as: Zoloft   Take 1 Tab by mouth every day.  Dose: 50 mg        CONTINUE taking these medications      Instructions   Dulaglutide 0.75 MG/0.5ML Sopn   Doctor's comments: Please dispense 12 pens with 3 refills  Inject 0.5 mL as instructed every 7 days.  Dose: 0.5 mL     insulin detemir 100 UNIT/ML injection PEN  Commonly known as: Levemir   Inject 15 Units as instructed every evening.  Dose: 15 Units     lisinopril 40 MG tablet  Commonly known as: PRINIVIL   Take 1 Tab by mouth every day.  Dose: 40 mg     Pen Needles 5/16\" 30G X 8 MM Misc   1 Unknown by Does not apply route every day. To be used with insulin pen  Dose: 1 Unknown        STOP taking these medications    aspirin EC 81 MG Tbec  Commonly known as: ECOTRIN     naproxen 500 MG Tabs  Commonly known as: NAPROSYN            Allergies  Allergies   Allergen Reactions   • Food      Spicy cinnamon--sores in mouth and tongue swelling "       DIET  Orders Placed This Encounter   Procedures   • Diet Order Diet: Consistent CHO (Diabetic)     Standing Status:   Standing     Number of Occurrences:   1     Order Specific Question:   Diet:     Answer:   Consistent CHO (Diabetic) [4]       ACTIVITY  As tolerated.  Weight bearing as tolerated    CONSULTATIONS  Neurology and critical care          LABORATORY  Lab Results   Component Value Date    SODIUM 140 11/11/2020    POTASSIUM 4.1 11/11/2020    CHLORIDE 104 11/11/2020    CO2 24 11/11/2020    GLUCOSE 176 (H) 11/11/2020    BUN 12 11/11/2020    CREATININE 0.80 11/11/2020        Lab Results   Component Value Date    WBC 6.6 11/10/2020    HEMOGLOBIN 14.2 11/10/2020    HEMATOCRIT 43.6 11/10/2020    PLATELETCT 187 11/10/2020        Total time of the discharge process exceeds 35 minutes.

## 2020-11-12 NOTE — PROGRESS NOTES
Report received and assumed care of patient. Patient resting in bed with no distress noted. No c/o pain or weakness. Assisted to the BR and to chair for breakfast, patient transfers with standby assist and gait is steady. Call bell in reach and safety measures in place.

## 2020-11-12 NOTE — PROGRESS NOTES
Patient's IV discontinued and discharge instructions given. Patient discharged to home with mother walking out with escort.

## 2020-11-12 NOTE — CARE PLAN
Problem: Safety  Goal: Will remain free from injury  Outcome: PROGRESSING AS EXPECTED     Problem: Safety  Goal: Will remain free from falls  Outcome: PROGRESSING AS EXPECTED     Problem: Skin Integrity  Goal: Risk for impaired skin integrity will decrease  Outcome: PROGRESSING AS EXPECTED

## 2020-11-12 NOTE — DISCHARGE INSTRUCTIONS
Discharge Instructions    Discharged to home by car with relative. Discharged via wheelchair, hospital escort: Yes.  Special equipment needed: Not Applicable    Be sure to schedule a follow-up appointment with your primary care doctor or any specialists as instructed.     Discharge Plan:   Diet Plan: Discussed  Activity Level: Discussed  Confirmed Follow up Appointment: Patient to Call and Schedule Appointment  Confirmed Symptoms Management: Discussed  Medication Reconciliation Updated: Yes  Influenza Vaccine Indication: Patient Refuses    I understand that a diet low in cholesterol, fat, and sodium is recommended for good health. Unless I have been given specific instructions below for another diet, I accept this instruction as my diet prescription.   Other diet: diabetic    Special Instructions: None    · Is patient discharged on Warfarin / Coumadin?   No     Follow-up with PCP for blood pressure recheck  Patient will need follow-up chemistry panel on his electrolytes since he is starting hydrochlorothiazide  [facility]  Follow-up on thyroid nodules noted on CTA of neck      Hypertension, Adult  Hypertension is another name for high blood pressure. High blood pressure forces your heart to work harder to pump blood. This can cause problems over time.  There are two numbers in a blood pressure reading. There is a top number (systolic) over a bottom number (diastolic). It is best to have a blood pressure that is below 120/80. Healthy choices can help lower your blood pressure, or you may need medicine to help lower it.  What are the causes?  The cause of this condition is not known. Some conditions may be related to high blood pressure.  What increases the risk?  · Smoking.  · Having type 2 diabetes mellitus, high cholesterol, or both.  · Not getting enough exercise or physical activity.  · Being overweight.  · Having too much fat, sugar, calories, or salt (sodium) in your diet.  · Drinking  too much alcohol.  · Having long-term (chronic) kidney disease.  · Having a family history of high blood pressure.  · Age. Risk increases with age.  · Race. You may be at higher risk if you are .  · Gender. Men are at higher risk than women before age 45. After age 65, women are at higher risk than men.  · Having obstructive sleep apnea.  · Stress.  What are the signs or symptoms?  · High blood pressure may not cause symptoms. Very high blood pressure (hypertensive crisis) may cause:  ? Headache.  ? Feelings of worry or nervousness (anxiety).  ? Shortness of breath.  ? Nosebleed.  ? A feeling of being sick to your stomach (nausea).  ? Throwing up (vomiting).  ? Changes in how you see.  ? Very bad chest pain.  ? Seizures.  How is this treated?  · This condition is treated by making healthy lifestyle changes, such as:  ? Eating healthy foods.  ? Exercising more.  ? Drinking less alcohol.  · Your health care provider may prescribe medicine if lifestyle changes are not enough to get your blood pressure under control, and if:  ? Your top number is above 130.  ? Your bottom number is above 80.  · Your personal target blood pressure may vary.  Follow these instructions at home:  Eating and drinking    · If told, follow the DASH eating plan. To follow this plan:  ? Fill one half of your plate at each meal with fruits and vegetables.  ? Fill one fourth of your plate at each meal with whole grains. Whole grains include whole-wheat pasta, brown rice, and whole-grain bread.  ? Eat or drink low-fat dairy products, such as skim milk or low-fat yogurt.  ? Fill one fourth of your plate at each meal with low-fat (lean) proteins. Low-fat proteins include fish, chicken without skin, eggs, beans, and tofu.  ? Avoid fatty meat, cured and processed meat, or chicken with skin.  ? Avoid pre-made or processed food.  · Eat less than 1,500 mg of salt each day.  · Do not drink alcohol if:  ? Your doctor tells you not to  drink.  ? You are pregnant, may be pregnant, or are planning to become pregnant.  · If you drink alcohol:  ? Limit how much you use to:  § 0-1 drink a day for women.  § 0-2 drinks a day for men.  ? Be aware of how much alcohol is in your drink. In the [place] one drink equals one 12 oz bottle of beer (355 mL), one 5 oz glass of wine (148 mL), or one 1½ oz glass of hard liquor (44 mL).  Lifestyle    · Work with your doctor to stay at a healthy weight or to lose weight. Ask your doctor what the best weight is for you.  · Get at least 30 minutes of exercise most days of the week. This may include walking, swimming, or biking.  · Get at least 30 minutes of exercise that strengthens your muscles (resistance exercise) at least 3 days a week. This may include lifting weights or doing Pilates.  · Do not use any products that contain nicotine or tobacco, such as cigarettes, e-cigarettes, and chewing tobacco. If you need help quitting, ask your doctor.  · Check your blood pressure at home as told by your doctor.  · Keep all follow-up visits as told by your doctor. This is important.  Medicines  · Take over-the-counter and prescription medicines only as told by your doctor. Follow directions carefully.  · Do not skip doses of blood pressure medicine. The medicine does not work as well if you skip doses. Skipping doses also puts you at risk for problems.  · Ask your doctor about side effects or reactions to medicines that you should watch for.  Contact a doctor if you:  · Think you are having a reaction to the medicine you are taking.  · Have headaches that keep coming back (recurring).  · Feel dizzy.  · Have swelling in your ankles.  · Have trouble with your vision.  Get help right away if you:  · Get a very bad headache.  · Start to feel mixed up (confused).  · Feel weak or numb.  · Feel faint.  · Have very bad pain in your:  ? Chest.  ? Belly (abdomen).  · Throw up more than once.  · Have trouble  breathing.  Summary  · Hypertension is another name for high blood pressure.  · High blood pressure forces your heart to work harder to pump blood.  · For most people, a normal blood pressure is less than 120/80.  · Making healthy choices can help lower blood pressure. If your blood pressure does not get lower with healthy choices, you may need to take medicine.  This information is not intended to replace advice given to you by your health care provider. Make sure you discuss any questions you have with your health care provider.  Document Released: 06/05/2009 Document Revised: 08/28/2019 Document Reviewed: 08/28/2019  [employer] Patient Education © 2020 [employer]      Intracranial Hemorrhage  An intracranial hemorrhage is bleeding in the layers between the skull (cranium) and brain. A blood vessel bursts and allows blood to leak inside the cranial cavity. The leaking blood then collects (hematoma). This causes pressure and damage to brain cells. The bleeding can be mild to severe. In severe cases, it can lead to permanent damage or death. Symptoms may come on suddenly or develop over time. Early diagnosis and treatment leads to better recovery.  There are four types of intracranial hemorrhage: subarachnoid, subdural, extradural, or cerebral hemorrhage.  What are the causes?  · Head injury (trauma).  · Ruptured brain aneurysm.  · Bleeding from blood vessels that develop abnormally (arteriovenous malformation).  · Bleeding disorder.  · Use of blood thinners (anticoagulants).  · Use of certain drugs, such as cocaine.  For some people with intracranial hemorrhage, the cause is unknown.  What increases the risk?  · Using tobacco products, such as cigarettes and chewing tobacco.  · Having high blood pressure (hypertension).  · Abusing alcohol.  · Being a female, especially of postmenopausal age.  · Having a family history of disease in the blood vessels of the brain (cerebrovascular disease).  · Having certain genetic  syndromes that result in kidney disease or connective tissue disease.  What are the signs or symptoms?  · A sudden, severe headache with no known cause. The headache is often described as the worst headache ever experienced.  · Nausea or vomiting, especially when combined with other symptoms such as a headache.  · Sudden weakness or numbness of the face, arm, or leg, especially on one side of the body.  · Sudden trouble walking or difficulty moving arms or legs.  · Sudden confusion.  · Sudden personality changes.  · Trouble speaking (aphasia) or understanding.  · Difficulty swallowing.  · Sudden trouble seeing in one or both eyes.  · Double vision.  · Dizziness.  · Loss of balance or coordination.  · Intolerance to light.  · Stiff neck.  How is this diagnosed?  Your health care provider will perform a physical exam and ask about your symptoms. If an intracranial hemorrhage is suspected, various tests may be ordered. These tests may include:  · A CT scan.  · An MRI.  · A cerebral angiogram.  · A spinal tap (lumbar puncture).  · Blood tests.  How is this treated?  Immediate treatment in the hospital is often required to reduce the risk of brain damage. Treatment will depend on the cause of the bleeding, where it is located, and the extent of the bleeding and damage. The goals of treatment include stopping the bleeding, repairing the cause of bleeding, providing relief of symptoms, and preventing problems.  · Medicines may be given to:  ¨ Lower blood pressure (antihypertensives).  ¨ Relieve pain (analgesics).  ¨ Relieve nausea or vomiting.  · Surgery may be needed to stop the bleeding, repair the cause of the bleeding, or remove the blood.  · Rehabilitation may be needed to improve any cognitive and day-to-day functions impaired by the condition.  Further treatment depends on the duration, severity, and cause of your symptoms. Physical, speech, and occupational therapists will assess you and work to improve any  functions impaired by the intracranial hemorrhage. Measures will be taken to prevent short-term and long-term problems, including infection from breathing foreign material into the lungs (aspiration pneumonia), blood clots in the legs, bedsores, and falls.  Follow these instructions at home:  · Take medicines only as directed by your health care provider.  · Eat healthy foods as directed by your health care provider:  ¨ A diet low in salt (sodium), saturated fat, trans fat, and cholesterol may be recommended to manage your blood pressure.  ¨ Foods may need to be soft or pureed, or small bites may need to be taken in order to avoid aspirating or choking.  ¨ If studies show that your ability to swallow safely has been affected, you may need to seek help from specialists such as a dietitian, speech and language pathologist, or an occupational therapist. These health care providers can teach you how to safely get the nutrition your body needs.  · Rest and limit activities or movements as directed by your health care provider.  · Do not use any tobacco products including cigarettes, chewing tobacco, or electronic cigarettes. If you need help quitting, ask your health care provider.  · Limit alcohol intake to no more than 1 drink per day for nonpregnant women and 2 drinks per day for men. One drink equals 12 ounces of beer, 5 ounces of wine, or 1½ ounces of hard liquor.  · Make any other lifestyle changes as directed by your health care provider.  · Monitor and record your blood pressure as directed by your health care provider.  · A safe home environment is important to reduce the risk of falls. Your health care provider may arrange for specialists to evaluate your home. Having grab bars in the bedroom and bathroom is often important. Your health care provider may arrange for special equipment to be used at home, such as raised toilets and a seat for the shower.  · Do physical, occupational, and speech therapy as  directed by your health care provider. Ongoing therapy may be needed to maximize your recovery.  · Use a walker or a cane at all times if directed by your health care provider.  · Keep all follow-up visits with your health care provider and other specialists. This is important. This includes any referrals, physical therapy, and rehabilitation.  Get help right away if:  · You have a sudden, severe headache with no known cause.  · You have nausea or vomiting occurring with another symptom.  · You have sudden weakness or numbness of the face, arm, or leg, especially on one side of the body.  · You have sudden trouble walking or difficulty moving your arms or legs.  · You have sudden confusion.  · You have trouble speaking (aphasia) or understanding.  · You have sudden trouble seeing in one or both eyes.  · You have a sudden loss of balance or coordination.  · You have a stiff neck.  · You have difficulty breathing.  · You have a partial or total loss of consciousness.  These symptoms may represent a serious problem that is an emergency. Do not wait to see if the symptoms will go away. Get medical help right away. Call your local emergency services (911 in the [place] Do not drive yourself to the hospital.   This information is not intended to replace advice given to you by your health care provider. Make sure you discuss any questions you have with your health care provider.  Document Released: 07/15/2015 Document Revised: 05/19/2017 Document Reviewed: 02/11/2015  [employer] Patient Education © 2017 [employer]           Depression / Suicide Risk    As you are discharged from this [facility] facility, it is important to learn how to keep safe from harming yourself.    Recognize the warning signs:  · Abrupt changes in personality, positive or negative- including increase in energy   · Giving away possessions  · Change in eating patterns- significant weight changes-  positive or negative  · Change in  sleeping patterns- unable to sleep or sleeping all the time   · Unwillingness or inability to communicate  · Depression  · Unusual sadness, discouragement and loneliness  · Talk of wanting to die  · Neglect of personal appearance   · Rebelliousness- reckless behavior  · Withdrawal from people/activities they love  · Confusion- inability to concentrate     If you or a loved one observes any of these behaviors or has concerns about self-harm, here's what you can do:  · Talk about it- your feelings and reasons for harming yourself  · Remove any means that you might use to hurt yourself (examples: pills, rope, extension cords, firearm)  · Get professional help from the community (Mental Health, Substance Abuse, psychological counseling)  · Do not be alone:Call your Safe Contact- someone whom you trust who will be there for you.  · Call your local CRISIS HOTLINE 893-9384 or 323-966-4026  · Call your local Children's Mobile Crisis Response Team [place] (154) 535-7745 or www.[employer]  · Call the toll free National Suicide Prevention Hotlines   · National Suicide Prevention Lifeline 894-869-JVNK (1349)  · National Hope Line Network 800-SUICIDE (491-3697)

## 2020-11-17 ENCOUNTER — OFFICE VISIT (OUTPATIENT)
Dept: MEDICAL GROUP | Facility: PHYSICIAN GROUP | Age: 55
End: 2020-11-17
Payer: COMMERCIAL

## 2020-11-17 VITALS
WEIGHT: 255 LBS | BODY MASS INDEX: 38.65 KG/M2 | RESPIRATION RATE: 16 BRPM | HEIGHT: 68 IN | HEART RATE: 80 BPM | SYSTOLIC BLOOD PRESSURE: 140 MMHG | DIASTOLIC BLOOD PRESSURE: 82 MMHG | TEMPERATURE: 97.6 F | OXYGEN SATURATION: 93 %

## 2020-11-17 DIAGNOSIS — Z79.4 TYPE 2 DIABETES MELLITUS WITH OTHER SPECIFIED COMPLICATION, WITH LONG-TERM CURRENT USE OF INSULIN (HCC): ICD-10-CM

## 2020-11-17 DIAGNOSIS — E11.69 TYPE 2 DIABETES MELLITUS WITH OTHER SPECIFIED COMPLICATION, WITH LONG-TERM CURRENT USE OF INSULIN (HCC): ICD-10-CM

## 2020-11-17 DIAGNOSIS — G47.33 OSA (OBSTRUCTIVE SLEEP APNEA): ICD-10-CM

## 2020-11-17 DIAGNOSIS — G47.33 OBSTRUCTIVE SLEEP APNEA SYNDROME: ICD-10-CM

## 2020-11-17 DIAGNOSIS — Z11.59 NEED FOR HEPATITIS C SCREENING TEST: ICD-10-CM

## 2020-11-17 DIAGNOSIS — I61.3 PONTINE HEMORRHAGE (HCC): ICD-10-CM

## 2020-11-17 PROCEDURE — 99214 OFFICE O/P EST MOD 30 MIN: CPT | Performed by: NURSE PRACTITIONER

## 2020-11-17 ASSESSMENT — FIBROSIS 4 INDEX: FIB4 SCORE: 1

## 2020-11-17 NOTE — Clinical Note
Need [facility] order for Rk per patient's request for new CPAP unit and supplies, please send order with recent sleep study

## 2020-11-17 NOTE — PROGRESS NOTES
Chief Complaint   Patient presents with   • Hospital Follow-up   • Hypertension         This is a 55 y.o.male patient that presents today with the following: hospital follow up, stroke    Acute hemorrhagic CVA (Pontine hemorrhage) (Formerly [facility])  Patient recently hospitalized for stroke, suffered a pontine hemorrhage  He is now scheduled [facility]  Reports he has very few residual symptoms, at times his left leg will become weak and sometimes he finds that his speech is slurred but overall they have significantly improved  He does have a history of uncontrolled hypertension and diabetes but since he establish care with me we have got him back on all of his medications    DESTIN (obstructive sleep apnea)  Patient recently underwent sleep apnea which showed severe DESTIN  He does need new supplies, possibly including a new unit  We will send order to DME company of patient's choice for supplies and possible new unit    DM2 (diabetes mellitus, type 2) (Formerly [facility])  This is a chronic condition, uncontrolled  However he is restarted much of his medications, he is on Levemir insulin 15 units daily, Metformin 1000 mg twice a day  Appropriately on statin  He is to follow-up with me in 3 months with labs done before visit  Due for monofilament exam as well as retinal scan, these were completed today      Admission on 2020, Discharged on 2020   Component Date Value   • Report 2020                      Value:[facility]    Test Date:  2020  Pt Name:    RASHMI MOON              Department: ER  MRN:        [id number]                      Room:       [facility]  Gender:     Male                         Technician: [id number]  :        1965                   Requested By:ELVA PIPER  Order #:    [id number]                    Reading MD: ELVA PIPER, DO    Measurements  Intervals                                Axis  Rate:       77                           P:           42  TX:         180                          QRS:        -74  QRSD:       148                          T:          113  QT:         440  QTc:        499    Interpretive Statements  SINUS RHYTHM  RIGHT BUNDLE BRANCH BLOCK  LVH WITH IVCD AND SECONDARY REPOL ABNRM  No previous ECG available for comparison  Electronically Signed On 11-9-2020 12:50:20 PST by ELVA PIPER DO     • WBC 11/09/2020 5.9    • RBC 11/09/2020 5.67    • Hemoglobin 11/09/2020 15.0    • Hematocrit 11/09/2020 45.6    • MCV 11/09/2020 80.4*   • MCH 11/09/2020 26.5*   • MCHC 11/09/2020 32.9*   • RDW 11/09/2020 44.3    • Platelet Count 11/09/2020 185    • MPV 11/09/2020 10.0    • Neutrophils-Polys 11/09/2020 62.10    • Lymphocytes 11/09/2020 26.90    • Monocytes 11/09/2020 7.80    • Eosinophils 11/09/2020 2.40    • Basophils 11/09/2020 0.50    • Immature Granulocytes 11/09/2020 0.30    • Nucleated RBC 11/09/2020 0.00    • Neutrophils (Absolute) 11/09/2020 3.68    • Lymphs (Absolute) 11/09/2020 1.59    • Monos (Absolute) 11/09/2020 0.46    • Eos (Absolute) 11/09/2020 0.14    • Baso (Absolute) 11/09/2020 0.03    • Immature Granulocytes (a* 11/09/2020 0.02    • NRBC (Absolute) 11/09/2020 0.00    • Sodium 11/09/2020 135    • Potassium 11/09/2020 4.0    • Chloride 11/09/2020 100    • Co2 11/09/2020 24    • Anion Gap 11/09/2020 11.0    • Glucose 11/09/2020 222*   • Bun 11/09/2020 15    • Creatinine 11/09/2020 0.75    • Calcium 11/09/2020 8.9    • AST(SGOT) 11/09/2020 17    • ALT(SGPT) 11/09/2020 27    • Alkaline Phosphatase 11/09/2020 91    • Total Bilirubin 11/09/2020 0.5    • Albumin 11/09/2020 4.1    • Total Protein 11/09/2020 6.7    • Globulin 11/09/2020 2.6    • A-G Ratio 11/09/2020 1.6    • PT 11/09/2020 13.1    • INR 11/09/2020 0.96    • APTT 11/09/2020 25.2    • ABO Grouping Only 11/09/2020 A    • Rh Grouping Only 11/09/2020 POS    • Antibody Screen-Cod 11/09/2020 NEG    • Troponin T 11/09/2020 27*   • GFR If  11/09/2020 >60    •  GFR If Non  [facility]meri* 11/09/2020 >60    • COVID Order Status 11/09/2020 Received    • Cholesterol,Tot 11/09/2020 126    • Triglycerides 11/09/2020 82    • HDL 11/09/2020 38*   • LDL 11/09/2020 72    • Sodium 11/09/2020 134*   • Reaction Time Initial-R 11/09/2020 6.1    • Clot Kinetics-K 11/09/2020 2.6    • Clot Angle-Angle 11/09/2020 57.5    • Maximum Clot Strength-MA 11/09/2020 60.5    • Lysis 30 minutes-LY30 11/09/2020 0.0    • % Inhibition ADP 11/09/2020 0.0    • % Inhibition AA 11/09/2020 0.0    • TEG Algorithm 11/09/2020 Link Algorithm    • Magnesium 11/09/2020 1.7    • Glycohemoglobin 11/09/2020 10.7*   • Est Avg Glucose 11/09/2020 260    • SARS-CoV-2 Source 11/09/2020 NP Swab    • SARS-CoV-2 by PCR 11/09/2020 NotDetected    • Troponin T 11/09/2020 27*   • Phosphorus 11/09/2020 2.4*   • ABO Rh Confirm 11/09/2020 A POS    • Troponin T 11/09/2020 26*   • Left Ventrical Ejection * 11/09/2020 60    • Sodium 11/09/2020 137    • Sodium 11/09/2020 132*   • Troponin T 11/09/2020 27*   • Glucose - Accu-Ck 11/09/2020 184*   • WBC 11/10/2020 6.6    • RBC 11/10/2020 5.38    • Hemoglobin 11/10/2020 14.2    • Hematocrit 11/10/2020 43.6    • MCV 11/10/2020 81.0*   • MCH 11/10/2020 26.4*   • MCHC 11/10/2020 32.6*   • RDW 11/10/2020 45.3    • Platelet Count 11/10/2020 187    • MPV 11/10/2020 10.4    • Neutrophils-Polys 11/10/2020 61.20    • Lymphocytes 11/10/2020 29.70    • Monocytes 11/10/2020 7.10    • Eosinophils 11/10/2020 1.20    • Basophils 11/10/2020 0.50    • Immature Granulocytes 11/10/2020 0.30    • Nucleated RBC 11/10/2020 0.00    • Neutrophils (Absolute) 11/10/2020 4.04    • Lymphs (Absolute) 11/10/2020 1.96    • Monos (Absolute) 11/10/2020 0.47    • Eos (Absolute) 11/10/2020 0.08    • Baso (Absolute) 11/10/2020 0.03    • Immature Granulocytes (a* 11/10/2020 0.02    • NRBC (Absolute) 11/10/2020 0.00    • Sodium 11/10/2020 133*   • Potassium 11/10/2020 3.6    • Chloride 11/10/2020 101    • Co2 11/10/2020 24    •  Glucose 11/10/2020 191*   • Bun 11/10/2020 9    • Creatinine 11/10/2020 0.66    • Calcium 11/10/2020 8.6    • Anion Gap 11/10/2020 8.0    • Glucose - Accu-Ck 11/09/2020 176*   • Glucose - Accu-Ck 11/10/2020 185*   • Magnesium 11/10/2020 1.9    • GFR If  11/10/2020 >60    • GFR If Non  Ameri* 11/10/2020 >60    • Glucose - Accu-Ck 11/10/2020 148*   • Glucose - Accu-Ck 11/10/2020 218*   • Glucose - Accu-Ck 11/10/2020 187*   • Glucose - Accu-Ck 11/11/2020 190*   • Magnesium 11/11/2020 2.2    • Sodium 11/11/2020 140    • Potassium 11/11/2020 4.1    • Chloride 11/11/2020 104    • Co2 11/11/2020 24    • Glucose 11/11/2020 176*   • Bun 11/11/2020 12    • Creatinine 11/11/2020 0.80    • Calcium 11/11/2020 8.8    • Anion Gap 11/11/2020 12.0    • GFR If  11/11/2020 >60    • GFR If Non  Ameri* 11/11/2020 >60    • Glucose - Accu-Ck 11/11/2020 175*   • Glucose - Accu-Ck 11/11/2020 282*   • Glucose - Accu-Ck 11/11/2020 178*   • Glucose - Accu-Ck 11/12/2020 194*   • Glucose - Accu-Ck 11/12/2020 157*   • Glucose - Accu-Ck 11/12/2020 168*   • Glucose - Accu-Ck 11/12/2020 209*         clinical course has been stable    Past Medical History:   Diagnosis Date   • Hyperlipidemia    • Hypertension    • Stroke (HCC)    • Type II or unspecified type diabetes mellitus without mention of complication, not stated as uncontrolled        History reviewed. No pertinent surgical history.    Family History   Problem Relation Age of Onset   • Cancer Mother         Breast cancer   • Heart Disease Father         CHF       Food    Current Outpatient Medications Ordered in Epic   Medication Sig Dispense Refill   • amLODIPine (NORVASC) 10 MG Tab Take 1 Tab by mouth every day. 30 Tab 0   • hydrochlorothiazide (MICROZIDE) 12.5 MG capsule Take 1 Cap by mouth every day. 30 Cap 0   • Dulaglutide 0.75 MG/0.5ML Solution Pen-injector Inject 0.5 mL as instructed every 7 days. 6 mL 3   • insulin detemir (LEVEMIR) 100  "UNIT/ML injection PEN Inject 15 Units as instructed every evening. 5 Each 3   • metFORMIN ER (GLUCOPHAGE XR) 500 MG TABLET SR 24 HR Take 2 Tabs by mouth 2 times a day. (Patient taking differently: Take 1,000 mg by mouth 2 times a day. 2 tablets = 1000 mg) 360 Tab 3   • lisinopril (PRINIVIL) 40 MG tablet Take 1 Tab by mouth every day. 90 Tab 3   • pravastatin (PRAVACHOL) 40 MG tablet Take 1 Tab by mouth every day. (Patient taking differently: Take 40 mg by mouth every evening.) 90 Tab 3   • sertraline (ZOLOFT) 50 MG Tab Take 1 Tab by mouth every day. (Patient taking differently: Take 50 mg by mouth every evening.) 90 Tab 3   • Insulin Pen Needle (PEN NEEDLES 5/16\") 30G X 8 MM Misc 1 Unknown by Does not apply route every day. To be used with insulin pen 100 Each 3     No current Epic-ordered facility-administered medications on file.        Constitutional ROS: No unexpected change in weight, No weakness, No unexplained fevers, sweats, or chills  Pulmonary ROS: No chronic cough, sputum, or hemoptysis, No shortness of breath, No recent change in breathing.  positive for DESTIN  Cardiovascular ROS: No chest pain  Gastrointestinal ROS: No abdominal pain, No nausea, vomiting, diarrhea, or constipation  Musculoskeletal/Extremities ROS: No clubbing, No peripheral edema, No pain, redness or swelling on the joints  Neurologic ROS: Normal development, No seizures, No weakness, Positive for stroke  Endocrine ROS: Positive per HPI    Physical exam:  /82   Pulse 80   Temp 36.4 °C (97.6 °F) (Temporal)   Resp 16   Ht 1.727 m (5' 8\")   Wt 115.7 kg (255 lb)   SpO2 93%   BMI 38.77 kg/m²   General Appearance: Pleasant middle-aged male, alert, no distress, obese, well-groomed  Skin: Skin color, texture, turgor normal. No rashes or lesions.  Lungs: negative findings: normal respiratory rate and rhythm, lungs clear to auscultation  Heart: negative. RRR without murmur, gallop, or rubs.  No ectopy.  Abdomen: Abdomen soft, " non-tender. BS normal. No masses,  No organomegaly  Musculoskeletal: negative findings: no evidence of joint instability, no evidence of muscle atrophy, no deformities present  Neurologic: intact, CN II through XII grossly intact  Diabetic Foot Exam: No ulcers, erythema or skin lesions present, patient tested with monofilament (10g) and tuning fork found to be sensitive bilaterally throughout the ball of the foot, great toe and heel.      Medical decision making/discussion:     Continue on meds     Follow up in 3 months with labs that were previously ordered    Will send sleep study with order for new supplies and unit    Joey was seen today for hospital follow-up and hypertension.    Diagnoses and all orders for this visit:    Type 2 diabetes mellitus with other specified complication, with long-term current use of insulin (HCC)  -     Diabetic Monofilament LE Exam  -     POCT Retinal Eye Exam    Acute hemorrhagic CVA (Pontine hemorrhage) (HCC)    Obstructive sleep apnea syndrome    DESTIN (obstructive sleep apnea)    Need for hepatitis C screening test  -     HCV Scrn ( [id number] [employer]); Future        Return in about 3 months (around 2021) for Follow-up, Discuss Labs.        Please note that this dictation was created using voice recognition software. I have made every reasonable attempt to correct obvious errors, but I expect that there are errors of grammar and possibly content that I did not discover before finalizing the note.

## 2020-11-17 NOTE — PATIENT INSTRUCTIONS
Continue on meds     Follow up in 3 months with labs that were previously ordered    Will send sleep study with order for new supplies and unit

## 2020-11-18 NOTE — ASSESSMENT & PLAN NOTE
Patient recently underwent sleep apnea which showed severe DESTIN  He does need new supplies, possibly including a new unit  We will send order to [employer] company of patient's choice for supplies and possible new unit

## 2020-11-18 NOTE — ASSESSMENT & PLAN NOTE
Patient recently hospitalized for stroke, suffered a pontine hemorrhage  He is now scheduled to [facility]  Reports he has very few residual symptoms, at times his left leg will become weak and sometimes he finds that his speech is slurred but overall they have significantly improved  He does have a history of uncontrolled hypertension and diabetes but since he establish care with me we have got him back on all of his medications

## 2020-11-18 NOTE — ASSESSMENT & PLAN NOTE
This is a chronic condition, uncontrolled  However he is restarted much of his medications, he is on Levemir insulin 15 units daily, Metformin 1000 mg twice a day  Appropriately on statin  He is to follow-up with me in 3 months with labs done before visit  Due for monofilament exam as well as retinal scan, these were completed today

## 2020-12-02 ENCOUNTER — OFFICE VISIT (OUTPATIENT)
Dept: NEUROLOGY | Facility: MEDICAL CENTER | Age: 55
End: 2020-12-02
Payer: COMMERCIAL

## 2020-12-02 VITALS
RESPIRATION RATE: 15 BRPM | OXYGEN SATURATION: 95 % | WEIGHT: 257.28 LBS | DIASTOLIC BLOOD PRESSURE: 88 MMHG | HEIGHT: 68 IN | TEMPERATURE: 97.6 F | BODY MASS INDEX: 38.99 KG/M2 | HEART RATE: 91 BPM | SYSTOLIC BLOOD PRESSURE: 144 MMHG

## 2020-12-02 DIAGNOSIS — G47.33 OSA (OBSTRUCTIVE SLEEP APNEA): ICD-10-CM

## 2020-12-02 DIAGNOSIS — E78.2 MIXED HYPERLIPIDEMIA: ICD-10-CM

## 2020-12-02 DIAGNOSIS — Z79.4 TYPE 2 DIABETES MELLITUS WITH OTHER SPECIFIED COMPLICATION, WITH LONG-TERM CURRENT USE OF INSULIN (HCC): ICD-10-CM

## 2020-12-02 DIAGNOSIS — E04.1 THYROID NODULE: ICD-10-CM

## 2020-12-02 DIAGNOSIS — I61.3 PONTINE HEMORRHAGE (HCC): ICD-10-CM

## 2020-12-02 DIAGNOSIS — E11.69 TYPE 2 DIABETES MELLITUS WITH OTHER SPECIFIED COMPLICATION, WITH LONG-TERM CURRENT USE OF INSULIN (HCC): ICD-10-CM

## 2020-12-02 DIAGNOSIS — I10 ESSENTIAL HYPERTENSION: ICD-10-CM

## 2020-12-02 PROCEDURE — 99215 OFFICE O/P EST HI 40 MIN: CPT | Performed by: NURSE PRACTITIONER

## 2020-12-02 RX ORDER — ATORVASTATIN CALCIUM 40 MG/1
40 TABLET, FILM COATED ORAL
Qty: 90 TAB | Refills: 1 | Status: SHIPPED | OUTPATIENT
Start: 2020-12-02 | End: 2021-06-16

## 2020-12-02 ASSESSMENT — ENCOUNTER SYMPTOMS
DOUBLE VISION: 0
COUGH: 0
NAUSEA: 0
NECK PAIN: 0
HEADACHES: 0
ORTHOPNEA: 1
BLURRED VISION: 0
FEVER: 0
HEARTBURN: 1
TREMORS: 0
SENSORY CHANGE: 0
SPEECH CHANGE: 0
NERVOUS/ANXIOUS: 0
TINGLING: 0
BACK PAIN: 0
DIZZINESS: 0
FOCAL WEAKNESS: 1
CHILLS: 0
DEPRESSION: 0
VOMITING: 0

## 2020-12-02 ASSESSMENT — FIBROSIS 4 INDEX: FIB4 SCORE: 1

## 2020-12-02 NOTE — PATIENT INSTRUCTIONS
Blood pressure goal less than 130/80    A1C goal less than 7.0, current 10.7,    LDL (bad cholesterol) goal 70, current 72, stop pravastatin and start atorvastatin 40mg every bedtime for stroke prevention (Pravastatin has not been proven effective for stroke prevention in the way that atorvastatin has) .  will need follow up with primary care evaluate liver function and intervals and refill    We will repeat your CT scan in 2 months and see you back here in about 3 months.  Do not have CT scan done prior to 2/9/2021, I want to make sure I have given the blood a chance to clear up prior to performing.    Talk to your primary care about thyroid nodules found on CTA of neck, you may need a thyroid ultrasound.     Avoid any over the counter anti-inflammatory drugs, use acetaminophen (tylenol) as needed for pain.      If any new signs of stroke:  sudden weakness, numbness, speech difficulty (slurring or difficulty finding words), imbalance, incoordination, worse headache of life or vision loss occur, call 911.      Take all medications as prescribed unless instructed by your provider.      Stroke Prevention  Some medical conditions and lifestyle choices can lead to a higher risk for a stroke. You can help to prevent a stroke by making nutrition, lifestyle, and other changes.  What nutrition changes can be made?    · Eat healthy foods.  ? Choose foods that are high in fiber. These include:  § Fresh fruits.  § Fresh vegetables.  § Whole grains.  ? Eat at least 5 or more servings of fruits and vegetables each day. Try to fill half of your plate at each meal with fruits and vegetables.  ? Choose lean protein foods. These include:  § Lowfat (lean) cuts of meat.  § Chicken without skin.  § Fish.  § Tofu.  § Beans.  § Nuts.  ? Eat low-fat dairy products.  ? Avoid foods that:  § Are high in salt (sodium).  § Have saturated fat.  § Have trans fat.  § Have cholesterol.  § Are processed.  § Are premade.  · Follow eating guidelines  as told by your doctor. These may include:  ? Reducing how many calories you eat and drink each day.  ? Limiting how much salt you eat or drink each day to 1,500 milligrams (mg).  ? Using only healthy fats for cooking. These include:  § Olive oil.  § Canola oil.  § Sunflower oil.  ? Counting how many carbohydrates you eat and drink each day.  What lifestyle changes can be made?  · Try to stay at a healthy weight. Talk to your doctor about what a good weight is for you.  · Get at least 30 minutes of moderate physical activity at least 5 days a week. This can include:  ? Fast walking.  ? Biking.  ? Swimming.  · Do not use any products that have nicotine or tobacco. This includes cigarettes and e-cigarettes. If you need help quitting, ask your doctor. Avoid being around tobacco smoke in general.  · Limit how much alcohol you drink to no more than 1 drink a day for nonpregnant women and 2 drinks a day for men. One drink equals 12 oz of beer, 5 oz of wine, or 1½ oz of hard liquor.  · Do not use drugs.  · Avoid taking birth control pills. Talk to your doctor about the risks of taking birth control pills if:  ? You are over 35 years old.  ? You smoke.  ? You get migraines.  ? You have had a blood clot.  What other changes can be made?  · Manage your cholesterol.  ? It is important to eat a healthy diet.  ? If your cholesterol cannot be managed through your diet, you may also need to take medicines. Take medicines as told by your doctor.  · Manage your diabetes.  ? It is important to eat a healthy diet and to exercise regularly.  ? If your blood sugar cannot be managed through diet and exercise, you may need to take medicines. Take medicines as told by your doctor.  · Control your high blood pressure (hypertension).  ? Try to keep your blood pressure below 130/80. This can help lower your risk of stroke.  ? It is important to eat a healthy diet and to exercise regularly.  ? If your blood pressure cannot be managed through  "diet and exercise, you may need to take medicines. Take medicines as told by your doctor.  ? Ask your doctor if you should check your blood pressure at home.  ? Have your blood pressure checked every year. Do this even if your blood pressure is normal.  · Talk to your doctor about getting checked for a sleep disorder. Signs of this can include:  ? Snoring a lot.  ? Feeling very tired.  · Take over-the-counter and prescription medicines only as told by your doctor. These may include aspirin or blood thinners (antiplatelets or anticoagulants).  · Make sure that any other medical conditions you have are managed.  Where to find more information  · American Stroke Association: www.strokeassociation.org  · National Stroke Association: www.stroke.org  Get help right away if:  · You have any symptoms of stroke. \"BE FAST\" is an easy way to remember the main warning signs:  ? B - Balance. Signs are dizziness, sudden trouble walking, or loss of balance.  ? E - Eyes. Signs are trouble seeing or a sudden change in how you see.  ? F - Face. Signs are sudden weakness or loss of feeling of the face, or the face or eyelid drooping on one side.  ? A - Arms. Signs are weakness or loss of feeling in an arm. This happens suddenly and usually on one side of the body.  ? S - Speech. Signs are sudden trouble speaking, slurred speech, or trouble understanding what people say.  ? T - Time. Time to call emergency services. Write down what time symptoms started.  · You have other signs of stroke, such as:  ? A sudden, very bad headache with no known cause.  ? Feeling sick to your stomach (nausea).  ? Throwing up (vomiting).  ? Jerky movements you cannot control (seizure).  These symptoms may represent a serious problem that is an emergency. Do not wait to see if the symptoms will go away. Get medical help right away. Call your local emergency services (911 in the [place] Do not drive yourself to the hospital.  Summary  · You can prevent a " stroke by eating healthy, exercising, not smoking, drinking less alcohol, and treating other health problems, such as diabetes, high blood pressure, or high cholesterol.  · Do not use any products that contain nicotine or tobacco, such as cigarettes and e-cigarettes.  · Get help right away if you have any signs or symptoms of a stroke.  This information is not intended to replace advice given to you by your health care provider. Make sure you discuss any questions you have with your health care provider.  Document Released: 06/18/2013 Document Revised: 02/13/2020 Document Reviewed: 03/21/2018  Elsevier Patient Education © 2020 [employer]

## 2020-12-02 NOTE — PROGRESS NOTES
Subjective:      HPI  Joey Dawson  is a 55 y.o. 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     He presented to [facility] on 11/9/2020  with complaints of headache and right sided weakness.  Blood pressure on admission 189/97.     He had consistently been using his CPAP but it was not as effective as it was in the past; he actually had a repeat sleep study the night prior to his admission.     PMH includes History of stroke, bursitis of left hip, DESTIN, DM,  HLD, obesity and HTN.  Social History:  No history of tobacco use, drinks 2 cans of beer weekly, denies drug use.  He works in sales.       Patient is here alone today.  Blood pressure at home averaging high 140s/80s.  He is awaiting his new CPAP but is still using his old machine.   He still feels like he has some balance issues and that his left leg is a little weak,  He is walking and exercising to build it up.  He denies any cognitive deficits.  He has been back to work since 11/16/2020.     Review of Systems   Constitutional: Negative for chills and fever.   HENT: Negative for hearing loss.    Eyes: Negative for blurred vision and double vision.   Respiratory: Negative for cough.    Cardiovascular: Positive for orthopnea. Negative for chest pain.   Gastrointestinal: Positive for heartburn. Negative for nausea and vomiting.   Genitourinary: Negative for dysuria.   Musculoskeletal: Negative for back pain and neck pain.   Skin: Negative for rash.   Neurological: Positive for focal weakness. Negative for dizziness, tingling, tremors, sensory change, speech change and headaches.   Psychiatric/Behavioral: Negative for depression. The patient is not nervous/anxious.      Current Outpatient Medications on File Prior to Visit   Medication Sig Dispense Refill   • amLODIPine (NORVASC) 10 MG Tab Take 1 Tab by mouth every day. 30 Tab 0   • hydrochlorothiazide (MICROZIDE) 12.5 MG capsule Take 1  "Cap by mouth every day. 30 Cap 0   • Dulaglutide 0.75 MG/0.5ML Solution Pen-injector Inject 0.5 mL as instructed every 7 days. 6 mL 3   • insulin detemir (LEVEMIR) 100 UNIT/ML injection PEN Inject 15 Units as instructed every evening. 5 Each 3   • metFORMIN ER (GLUCOPHAGE XR) 500 MG TABLET SR 24 HR Take 2 Tabs by mouth 2 times a day. (Patient taking differently: Take 1,000 mg by mouth 2 times a day. 2 tablets = 1000 mg) 360 Tab 3   • lisinopril (PRINIVIL) 40 MG tablet Take 1 Tab by mouth every day. 90 Tab 3   • sertraline (ZOLOFT) 50 MG Tab Take 1 Tab by mouth every day. (Patient taking differently: Take 50 mg by mouth every evening.) 90 Tab 3   • Insulin Pen Needle (PEN NEEDLES 5/16\") 30G X 8 MM Misc 1 Unknown by Does not apply route every day. To be used with insulin pen 100 Each 3     No current facility-administered medications on file prior to visit.      Past Medical History:   Diagnosis Date   • Hyperlipidemia    • Hypertension    • Stroke (HCC)    • Type II or unspecified type diabetes mellitus without mention of complication, not stated as uncontrolled           Objective:     I personally reviewed imaging below and agree with the findings  CT head 11/9/2020  1.  There is a focal area of increased attenuation in the right man most consistent with a small area of hemorrhage.  2.  There is an old infarct in the left posterior medial occipital lobe.    CTA head 11/9/2020  1.  CT angiogram of the [place] demonstrate no significant arterial stenosis or proximal occlusion.  CT Perfusion  1.  Cerebral blood flow less than 30% likely representing completed infarct = 11 mL.     2.  T Max more than 6 seconds likely representing combination of completed infarct and ischemia = 23 mL.     3.  Mismatched volume likely representing ischemic brain/penumbra = 12 mL     4.  Please note that the cerebral perfusion was performed on the limited brain tissue around the basal ganglia region. Infarct/ischemia outside the " "CT perfusion sections can be missed in this study.    CTA neck  1.  CT angiogram of the neck demonstrating no significant carotid artery stenosis or occlusion.  2.  There is a mildly enlarged heterogeneous thyroid gland with multiple thyroid nodules which could be further assessed on a nonemergent basis with thyroid ultrasound.    MRI brain 11/10/2020  1.  There is an approximately 13 x 16 mm sized acute right midbrain/cerebral peduncle hemorrhage.  2.  There are areas of small chronic hemorrhages in the basal ganglia and right medial parietal lobe.  3.  Large encephalomalacia with chronic hemorrhagic products in the left occipital lobe.  4.  Multiple chronic small infarcts.  5.  Mild chronic microvascular ischemic disease.  6.  Mild cerebral volume loss.    TTE:  LVEF 60%, LA not well visualized,     Stroke Labs: 0.80, LDL 72, A1C 10.7  /88 (BP Location: Left arm)   Pulse 91   Temp 36.4 °C (97.6 °F) (Temporal)   Resp 15   Ht 1.727 m (5' 8\")   Wt 116.7 kg (257 lb 4.4 oz)   SpO2 95%   BMI 39.12 kg/m²      Physical Exam    Constitutional:  Alert, no apparent distress,  Psych:   mood and affect WNL  Neuro:  Oriented X 4, speech fluent, naming and memory intact  Muskuloskeletal:  Moves all extremities equally, strength 5/5 bilaterally, no drift  CN II: Visual fields are full to confrontation. Fundoscopic exam is normal with sharp discs and no vascular changes. Pupils are 3 mm and briskly reactive to light.   CN III, IV, VI  EOMs intact, no ptosis  CN V: Facial sensation is intact to pinprick in all 3 divisions bilaterally. Corneal responses are intact.  CN VII: Face is symmetric with normal eye closure and smile.  CN VII: Hearing is normal to rubbing fingers  CN IX, X: Palate elevates symmetrically. Phonation is normal.  CN XI: Head turning and shoulder shrug are intact  CN XII: Tongue is midline with normal movements and no atrophy.                           Sensation to PP equal bilaterally                "  No limb ataxia with finger to nose and heel to shin                 Ambulates with steady gait.                 Rhomberg negative                Biceps,brachioradialis, tricep, patellar and ankle reflex all 1+     Cardiovascular:    S1S2, no abnormal rhythm auscultated, no peripheral edema  Neck:                     No carotid bruits noted   Pulmonary:            Respirations easy, lungs clear to auscultation all fields.     Skin:                     No obvious rashes.      Iniital NIHSS  3-4      Current NIHSS    1a. LOC: 0  1b. LOC Questions: 0  1c. LOC Commands: 0  2. Best Gaze:0  3. Visual Fields: 0  4. Facial Paresis: 0  5a. Motor arm left: 0  5b. Motor arm right: 0  6a. Motor leg left: 0  6b. Motor leg right: 0  7. Sensory: 0  8. Best Language: 0  9. Limb Ataxia0:   10. Dysarthria:0  11. Extinction/Inattention: 0    Total Score Current0          Current mRS1            Assessment/Plan:     1. 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  ICH Score  Amelia Coma Scale: 0    3-4 (2 points), 5-12 (1 point), 13-15 (0 points)  Age greater than 80:0   No (0 points), yes (1 point)  ICH volume >/= 30 mL: No (0 points), yes (1 point)  Intraventricular hemorrhage: No (0 points), yes (1 point)  Infratentorial origin of hemorrhage  1 No (0 points), yes (1 point)    Total score: 1    1 point: 13% mortality  2 points: 26% mortality  3 points: 72% mortality  4 points: 97% mortality  >/=5 points: 100% mortality    Stroke risk factors include HTN, HLD, DM DESTIN  Avoid NSAIDS, avoid alcohol    Repeat CT of head in 2 months, if no signs of bleeding would recommend starting 81mg aspirin daily for stroke prevention given risk factors.  Will only start ASA if blood pressure is well controlled.     2. Essential hypertension  Blood pressure goal less than 130/80, above goal in office today,     PCP for optimal management    3. Mixed hyperlipidemia  LDL goal < 70, current 72,  stop pravastatin 40mg and start atorvastatin 40mg per SPARCL trial for stroke prevention,  discussed medication side effects, will need follow up with primary care evaluate liver function and intervals and refill    Exercise at least 30 minutes daily, avoid red meat, fried foods, butter, cheese.   Eat 5-6 servings of vegetables and fruits daily, choose lean white meat without skin (chicken, turkey, white fish)--baked, broiled or grilled.      4. DESTIN (obstructive sleep apnea)   Continue CPAP.     5. DMII    A1C goal less than 7.0, current 10.7, follow up with endocrinology for optimal blood glucose control.     6.  Thryoid nodules:  Per CTA of neck       Radiology recomemnds follow up US, defer to primary are.    I have counseled patient on stroke prevention strategies, stroke symptoms and mimics.  Diet and exercise modifications.  We discussed medication side effects and instructions.         Follow up in 3 months, after CT.

## 2020-12-15 ENCOUNTER — TELEPHONE (OUTPATIENT)
Dept: NEUROLOGY | Facility: MEDICAL CENTER | Age: 55
End: 2020-12-15

## 2020-12-15 NOTE — TELEPHONE ENCOUNTER
atorvastatin (LIPITOR) 40 MG Tab 90 Tab 1/1 12/2/2020 12/2/2021    Sig - Route: Take 1 Tab by mouth every bedtime. - Oral    Sent to pharmacy as: Atorvastatin Calcium 40 MG Oral Tablet (LIPITOR)    Class: Phone In    Notes to Pharmacy: Refills per PCP      Called above script into [facility] in [place] script never was received by them. Spoke to Gisselle

## 2020-12-26 ENCOUNTER — TELEPHONE (OUTPATIENT)
Dept: URGENT CARE | Facility: PHYSICIAN GROUP | Age: 55
End: 2020-12-26

## 2020-12-26 ENCOUNTER — APPOINTMENT (OUTPATIENT)
Dept: URGENT CARE | Facility: PHYSICIAN GROUP | Age: 55
End: 2020-12-26
Payer: COMMERCIAL

## 2020-12-26 NOTE — TELEPHONE ENCOUNTER
1. Caller Name: Joey Dawson                        Call Back Number: [phone]      How would the patient prefer to be contacted with a response: Phone call OK to leave a detailed message    Patient needs orders for a new cpap machine and supplies sent to [facility] in [place] (also known as [facility] now). Ph: [phone]    Thank you!

## 2020-12-27 ENCOUNTER — OFFICE VISIT (OUTPATIENT)
Dept: URGENT CARE | Facility: PHYSICIAN GROUP | Age: 55
End: 2020-12-27
Payer: COMMERCIAL

## 2020-12-27 VITALS
TEMPERATURE: 95.8 F | RESPIRATION RATE: 16 BRPM | HEIGHT: 68 IN | BODY MASS INDEX: 39.56 KG/M2 | HEART RATE: 78 BPM | SYSTOLIC BLOOD PRESSURE: 162 MMHG | DIASTOLIC BLOOD PRESSURE: 96 MMHG | OXYGEN SATURATION: 94 % | WEIGHT: 261 LBS

## 2020-12-27 DIAGNOSIS — H69.93 DYSFUNCTION OF BOTH EUSTACHIAN TUBES: ICD-10-CM

## 2020-12-27 DIAGNOSIS — J32.9 RHINOSINUSITIS: ICD-10-CM

## 2020-12-27 PROCEDURE — 99213 OFFICE O/P EST LOW 20 MIN: CPT | Performed by: PHYSICIAN ASSISTANT

## 2020-12-27 ASSESSMENT — FIBROSIS 4 INDEX: FIB4 SCORE: 1

## 2020-12-27 NOTE — PROGRESS NOTES
Chief Complaint   Patient presents with   • Head Pain     x1-2weeks    • Sinus Problem     Jaw to ear px.    • Otalgia   • Cough       HISTORY OF PRESENT ILLNESS: Patient is a 55 y.o. male who presents today for the following:    Patient comes in for evaluation of a 2-week history of jaw pain, ear pressure, and clear sinus drainage.  He denies headache, fever, sore throat.  He does get into coughing fits due to the postnasal drip.  He has not taken any over-the-counter medication.  He denies shortness of breath, body aches, chills.    Patient Active Problem List    Diagnosis Date Noted   • Acute hemorrhagic CVA (Pontine hemorrhage) ([facility]) 11/09/2020     Priority: High   • Obesity (BMI 30-39.9) 05/29/2019     Priority: Low   • DM2 (diabetes mellitus, type 2) ([facility]) 04/09/2015     Priority: Low   • Sleep apnea 04/09/2015     Priority: Low   • Mixed hyperlipidemia 04/09/2015     Priority: Low   • Essential hypertension 12/02/2020   • Thyroid nodule 12/02/2020   • DESTIN (obstructive sleep apnea) 11/10/2020   • Situational depression 10/06/2020   • Trochanteric bursitis of left hip 10/06/2020   • Soft tissue mass 05/30/2019   • ED (erectile dysfunction) 06/16/2015   • Vitamin D deficiency 04/27/2015   • RBC microcytosis 04/27/2015   • Microalbuminuria 04/27/2015   • History of CVA (cerebrovascular accident) without residual deficits 04/09/2015       Allergies:Food    Current Outpatient Medications Ordered in Epic   Medication Sig Dispense Refill   • atorvastatin (LIPITOR) 40 MG Tab Take 1 Tab by mouth every bedtime. 90 Tab 1   • amLODIPine (NORVASC) 10 MG Tab Take 1 Tab by mouth every day. 30 Tab 0   • Dulaglutide 0.75 MG/0.5ML Solution Pen-injector Inject 0.5 mL as instructed every 7 days. 6 mL 3   • insulin detemir (LEVEMIR) 100 UNIT/ML injection PEN Inject 15 Units as instructed every evening. 5 Each 3   • metFORMIN ER (GLUCOPHAGE XR) 500 MG TABLET SR 24 HR Take 2 Tabs by mouth 2 times a day. (Patient taking differently:  "Take 1,000 mg by mouth 2 times a day. 2 tablets = 1000 mg) 360 Tab 3   • lisinopril (PRINIVIL) 40 MG tablet Take 1 Tab by mouth every day. 90 Tab 3   • sertraline (ZOLOFT) 50 MG Tab Take 1 Tab by mouth every day. (Patient taking differently: Take 50 mg by mouth every evening.) 90 Tab 3   • Insulin Pen Needle (PEN NEEDLES \") 30G X 8 MM Misc 1 Unknown by Does not apply route every day. To be used with insulin pen 100 Each 3   • hydrochlorothiazide (MICROZIDE) 12.5 MG capsule Take 1 Cap by mouth every day. (Patient not taking: Reported on 2020) 30 Cap 0     No current Epic-ordered facility-administered medications on file.        Past Medical History:   Diagnosis Date   • Hyperlipidemia    • Hypertension    • Stroke (HCC)    • Type II or unspecified type diabetes mellitus without mention of complication, not stated as uncontrolled        Social History     Tobacco Use   • Smoking status: Never Smoker   • Smokeless tobacco: Never Used   Substance Use Topics   • Alcohol use: Yes     Alcohol/week: 1.2 oz     Types: 2 Cans of beer per week     Comment: yearly   • Drug use: No       Family Status   Relation Name Status   • Mo  Alive   • Fa     • Sis ##Sis1 Alive     Family History   Problem Relation Age of Onset   • Cancer Mother         Breast cancer   • Heart Disease Father         CHF       Review of Systems:   Constitutional ROS: No unexpected change in weight, No weakness, No fatigue  Eye ROS: No recent significant change in vision, No eye pain, redness, discharge  Ear ROS: No drainage, No tinnitus or vertigo, No recent change in hearing  Mouth/Throat ROS: No teeth or gum problems, No bleeding gums, No tongue complaints  Neck ROS: No swollen glands, No significant pain in neck  Pulmonary ROS: No chronic cough, sputum, or hemoptysis, No dyspnea on exertion, No wheezing  Cardiovascular ROS: No diaphoresis, No edema, No palpitations  Musculoskeletal/Extremities ROS: No peripheral edema, No pain, redness " "or swelling on the joints  Hematologic/Lymphatic ROS: No chills, No night sweats, No weight loss  Skin/Integumentary ROS: No edema, No evidence of rash, No itching      Exam:  BP (!) 162/96   Pulse 78   Temp (!) 35.4 °C (95.8 °F) (Temporal)   Resp 16   Ht 1.727 m (5' 8\")   Wt 118.4 kg (261 lb)   SpO2 94%   General: Well developed, well nourished. No distress.    Eye: PERRL/EOMI; conjunctivae clear, lids normal.  ENMT: Lips without lesions, MMM. Oropharynx is clear. Bilateral TMs are within normal limits but bulging bilaterally with serous effusions.  Pulmonary: Unlabored respiratory effort. Lungs clear to auscultation, no wheezes, no rhonchi.    Cardiovascular: Regular rate and rhythm without murmur.   Neurologic: Grossly nonfocal. No facial asymmetry noted.  Lymph: No cervical lymphadenopathy noted.  Skin: Warm, dry, good turgor. No rashes in visible areas.   Psych: Normal mood. Alert and oriented to person, place and time.    Assessment/Plan:  Discussed likely viral etiology versus allergies.  Vitals and exam are unremarkable.  Low suspicion for pneumonia.  Discussed appropriate over-the-counter symptomatic medication, and when to return to clinic. Follow up for worsening or persistent symptoms.  1. Rhinosinusitis     2. Dysfunction of both eustachian tubes         "

## 2021-02-01 ENCOUNTER — PATIENT MESSAGE (OUTPATIENT)
Dept: MEDICAL GROUP | Facility: PHYSICIAN GROUP | Age: 56
End: 2021-02-01

## 2021-02-06 ENCOUNTER — HOSPITAL ENCOUNTER (OUTPATIENT)
Dept: LAB | Facility: MEDICAL CENTER | Age: 56
End: 2021-02-06
Attending: NURSE PRACTITIONER
Payer: COMMERCIAL

## 2021-02-06 DIAGNOSIS — E11.69 TYPE 2 DIABETES MELLITUS WITH OTHER SPECIFIED COMPLICATION, WITH LONG-TERM CURRENT USE OF INSULIN (HCC): ICD-10-CM

## 2021-02-06 DIAGNOSIS — E78.2 MIXED HYPERLIPIDEMIA: ICD-10-CM

## 2021-02-06 DIAGNOSIS — Z11.59 NEED FOR HEPATITIS C SCREENING TEST: ICD-10-CM

## 2021-02-06 DIAGNOSIS — I10 ESSENTIAL HYPERTENSION: ICD-10-CM

## 2021-02-06 DIAGNOSIS — Z79.4 TYPE 2 DIABETES MELLITUS WITH OTHER SPECIFIED COMPLICATION, WITH LONG-TERM CURRENT USE OF INSULIN (HCC): ICD-10-CM

## 2021-02-06 LAB
ALBUMIN SERPL BCP-MCNC: 3.9 G/DL (ref 3.2–4.9)
ALBUMIN/GLOB SERPL: 1.4 G/DL
ALP SERPL-CCNC: 105 U/L (ref 30–99)
ALT SERPL-CCNC: 18 U/L (ref 2–50)
ANION GAP SERPL CALC-SCNC: 10 MMOL/L (ref 7–16)
AST SERPL-CCNC: 16 U/L (ref 12–45)
BASOPHILS # BLD AUTO: 0.6 % (ref 0–1.8)
BASOPHILS # BLD: 0.04 K/UL (ref 0–0.12)
BILIRUB SERPL-MCNC: 0.4 MG/DL (ref 0.1–1.5)
BUN SERPL-MCNC: 18 MG/DL (ref 8–22)
CALCIUM SERPL-MCNC: 8.9 MG/DL (ref 8.5–10.5)
CHLORIDE SERPL-SCNC: 96 MMOL/L (ref 96–112)
CHOLEST SERPL-MCNC: 104 MG/DL (ref 100–199)
CO2 SERPL-SCNC: 24 MMOL/L (ref 20–33)
CREAT SERPL-MCNC: 0.92 MG/DL (ref 0.5–1.4)
CREAT UR-MCNC: 131.98 MG/DL
EOSINOPHIL # BLD AUTO: 0.11 K/UL (ref 0–0.51)
EOSINOPHIL NFR BLD: 1.8 % (ref 0–6.9)
ERYTHROCYTE [DISTWIDTH] IN BLOOD BY AUTOMATED COUNT: 42.1 FL (ref 35.9–50)
EST. AVERAGE GLUCOSE BLD GHB EST-MCNC: 278 MG/DL
FASTING STATUS PATIENT QL REPORTED: NORMAL
GLOBULIN SER CALC-MCNC: 2.7 G/DL (ref 1.9–3.5)
GLUCOSE SERPL-MCNC: 269 MG/DL (ref 65–99)
HBA1C MFR BLD: 11.3 % (ref 0–5.6)
HCT VFR BLD AUTO: 44.1 % (ref 42–52)
HCV AB SER QL: NORMAL
HDLC SERPL-MCNC: 33 MG/DL
HGB BLD-MCNC: 14.2 G/DL (ref 14–18)
IMM GRANULOCYTES # BLD AUTO: 0.02 K/UL (ref 0–0.11)
IMM GRANULOCYTES NFR BLD AUTO: 0.3 % (ref 0–0.9)
LDLC SERPL CALC-MCNC: 56 MG/DL
LYMPHOCYTES # BLD AUTO: 2.1 K/UL (ref 1–4.8)
LYMPHOCYTES NFR BLD: 33.5 % (ref 22–41)
MCH RBC QN AUTO: 26.6 PG (ref 27–33)
MCHC RBC AUTO-ENTMCNC: 32.2 G/DL (ref 33.7–35.3)
MCV RBC AUTO: 82.6 FL (ref 81.4–97.8)
MICROALBUMIN UR-MCNC: 54.4 MG/DL
MICROALBUMIN/CREAT UR: 412 MG/G (ref 0–30)
MONOCYTES # BLD AUTO: 0.47 K/UL (ref 0–0.85)
MONOCYTES NFR BLD AUTO: 7.5 % (ref 0–13.4)
NEUTROPHILS # BLD AUTO: 3.52 K/UL (ref 1.82–7.42)
NEUTROPHILS NFR BLD: 56.3 % (ref 44–72)
NRBC # BLD AUTO: 0 K/UL
NRBC BLD-RTO: 0 /100 WBC
PLATELET # BLD AUTO: 203 K/UL (ref 164–446)
PMV BLD AUTO: 10.5 FL (ref 9–12.9)
POTASSIUM SERPL-SCNC: 4.1 MMOL/L (ref 3.6–5.5)
PROT SERPL-MCNC: 6.6 G/DL (ref 6–8.2)
RBC # BLD AUTO: 5.34 M/UL (ref 4.7–6.1)
SODIUM SERPL-SCNC: 130 MMOL/L (ref 135–145)
TRIGL SERPL-MCNC: 76 MG/DL (ref 0–149)
WBC # BLD AUTO: 6.3 K/UL (ref 4.8–10.8)

## 2021-02-06 PROCEDURE — 36415 COLL VENOUS BLD VENIPUNCTURE: CPT

## 2021-02-06 PROCEDURE — G0472 HEP C SCREEN HIGH RISK/OTHER: HCPCS

## 2021-02-06 PROCEDURE — 80053 COMPREHEN METABOLIC PANEL: CPT

## 2021-02-06 PROCEDURE — 82043 UR ALBUMIN QUANTITATIVE: CPT

## 2021-02-06 PROCEDURE — 85025 COMPLETE CBC W/AUTO DIFF WBC: CPT

## 2021-02-06 PROCEDURE — 82570 ASSAY OF URINE CREATININE: CPT

## 2021-02-06 PROCEDURE — 80061 LIPID PANEL: CPT

## 2021-02-06 PROCEDURE — 83036 HEMOGLOBIN GLYCOSYLATED A1C: CPT

## 2021-02-16 ENCOUNTER — OFFICE VISIT (OUTPATIENT)
Dept: MEDICAL GROUP | Facility: PHYSICIAN GROUP | Age: 56
End: 2021-02-16
Payer: COMMERCIAL

## 2021-02-16 VITALS
SYSTOLIC BLOOD PRESSURE: 176 MMHG | HEIGHT: 68 IN | TEMPERATURE: 97.1 F | OXYGEN SATURATION: 93 % | HEART RATE: 87 BPM | BODY MASS INDEX: 39.1 KG/M2 | DIASTOLIC BLOOD PRESSURE: 110 MMHG | WEIGHT: 258 LBS | RESPIRATION RATE: 16 BRPM

## 2021-02-16 DIAGNOSIS — I10 ESSENTIAL HYPERTENSION: ICD-10-CM

## 2021-02-16 DIAGNOSIS — E11.69 TYPE 2 DIABETES MELLITUS WITH OTHER SPECIFIED COMPLICATION, WITH LONG-TERM CURRENT USE OF INSULIN (HCC): ICD-10-CM

## 2021-02-16 DIAGNOSIS — R80.9 MICROALBUMINURIA: ICD-10-CM

## 2021-02-16 DIAGNOSIS — Z79.4 TYPE 2 DIABETES MELLITUS WITH OTHER SPECIFIED COMPLICATION, WITH LONG-TERM CURRENT USE OF INSULIN (HCC): ICD-10-CM

## 2021-02-16 DIAGNOSIS — E78.2 MIXED HYPERLIPIDEMIA: ICD-10-CM

## 2021-02-16 PROCEDURE — 99214 OFFICE O/P EST MOD 30 MIN: CPT | Performed by: NURSE PRACTITIONER

## 2021-02-16 RX ORDER — DULAGLUTIDE 1.5 MG/.5ML
0.5 INJECTION, SOLUTION SUBCUTANEOUS
Qty: 2.24 ML | Refills: 3 | Status: SHIPPED | OUTPATIENT
Start: 2021-02-16 | End: 2021-03-08

## 2021-02-16 RX ORDER — AMLODIPINE BESYLATE 10 MG/1
10 TABLET ORAL DAILY
Qty: 90 TABLET | Refills: 3 | Status: SHIPPED | OUTPATIENT
Start: 2021-02-16 | End: 2021-09-15 | Stop reason: SDUPTHER

## 2021-02-16 ASSESSMENT — FIBROSIS 4 INDEX: FIB4 SCORE: 1.06

## 2021-02-16 ASSESSMENT — PATIENT HEALTH QUESTIONNAIRE - PHQ9: CLINICAL INTERPRETATION OF PHQ2 SCORE: 0

## 2021-02-17 NOTE — ASSESSMENT & PLAN NOTE
This is a chronic condition uncontrolled, A1c is over 11  Currently taking Metformin 1000 mg twice daily, Trulicity 0.75 mg once a week and Levemir insulin 15 units  He does not feel that his diet has changed  He does tell me that in the past he was on Trulicity 1.5 mg once a week, will increase the dose to reflect this.  He will also increase his Levemir to 20 units at bedtime and continue on the Metformin  He is also noted to have microalbuminuria, most likely related to uncontrolled hypertension and type 2 diabetes  We will plan on rechecking labs again in 3 months  Appropriately on statin

## 2021-02-17 NOTE — ASSESSMENT & PLAN NOTE
The ASCVD Risk score (Thadjulianna DUBOSE Jr, et al., 2013) failed to calculate.  Appropriately on statin, takes atorvastatin 40 mg daily  Up-to-date with labs  Discussed the importance of ongoing healthy lifestyle modifications

## 2021-02-17 NOTE — PROGRESS NOTES
Chief Complaint   Patient presents with   • Follow-Up     fv on medications, labs. cough that pt states that he cannot get rid of.        HISTORY OF PRESENT ILLNESS: Patient is a 55 y.o. male established patient who presents today to review labs, discuss chronic conditions    Microalbuminuria  See additional notes, medication adjustments have been made    Essential hypertension  This is a chronic condition, uncontrolled  On his med list he has amlodipine listed but does tell me he is not taking this anymore, he stopped taking when he ran out of the prescription  He is currently only taking lisinopril 40 mg daily, blood pressure extremely elevated 176/110 but he is asymptomatic of this  He was advised to continue lisinopril but will have him restart the amlodipine 10 mg daily  He is going to return in 2 to 3 days for blood pressure check    DM2 (diabetes mellitus, type 2) ([facility])  This is a chronic condition uncontrolled, A1c is over 11  Currently taking Metformin 1000 mg twice daily, Trulicity 0.75 mg once a week and Levemir insulin 15 units  He does not feel that his diet has changed  He does tell me that in the past he was on Trulicity 1.5 mg once a week, will increase the dose to reflect this.  He will also increase his Levemir to 20 units at bedtime and continue on the Metformin  He is also noted to have microalbuminuria, most likely related to uncontrolled hypertension and type 2 diabetes  We will plan on rechecking labs again in 3 months  Appropriately on statin    Mixed hyperlipidemia  The ASCVD Risk score (Thad SEDRICK Jr, et al., 2013) failed to calculate.  Appropriately on statin, takes atorvastatin 40 mg daily  Up-to-date with labs  Discussed the importance of ongoing healthy lifestyle modifications      Patient Active Problem List    Diagnosis Date Noted   • Acute hemorrhagic CVA (Pontine hemorrhage) ([facility]) 11/09/2020   • Obesity (BMI 30-39.9) 05/29/2019   • DM2 (diabetes mellitus, type 2) ([facility]) 04/09/2015   •  "Sleep apnea 04/09/2015   • Mixed hyperlipidemia 04/09/2015   • Essential hypertension 12/02/2020   • Thyroid nodule 12/02/2020   • DESTIN (obstructive sleep apnea) 11/10/2020   • Situational depression 10/06/2020   • Trochanteric bursitis of left hip 10/06/2020   • Soft tissue mass 05/30/2019   • ED (erectile dysfunction) 06/16/2015   • Vitamin D deficiency 04/27/2015   • RBC microcytosis 04/27/2015   • Microalbuminuria 04/27/2015   • History of CVA (cerebrovascular accident) without residual deficits 04/09/2015       Allergies:Food    Current Outpatient Medications   Medication Sig Dispense Refill   • amLODIPine (NORVASC) 10 MG Tab Take 1 tablet by mouth every day. 90 tablet 3   • Dulaglutide (TRULICITY) 1.5 MG/0.5ML Solution Pen-injector Inject 0.5 mL under the skin every 7 days. 2.24 mL 3   • atorvastatin (LIPITOR) 40 MG Tab Take 1 Tab by mouth every bedtime. 90 Tab 1   • Dulaglutide 0.75 MG/0.5ML Solution Pen-injector Inject 0.5 mL as instructed every 7 days. 6 mL 3   • insulin detemir (LEVEMIR) 100 UNIT/ML injection PEN Inject 15 Units as instructed every evening. 5 Each 3   • metFORMIN ER (GLUCOPHAGE XR) 500 MG TABLET SR 24 HR Take 2 Tabs by mouth 2 times a day. (Patient taking differently: Take 1,000 mg by mouth 2 times a day. 2 tablets = 1000 mg) 360 Tab 3   • lisinopril (PRINIVIL) 40 MG tablet Take 1 Tab by mouth every day. 90 Tab 3   • sertraline (ZOLOFT) 50 MG Tab Take 1 Tab by mouth every day. (Patient taking differently: Take 50 mg by mouth every evening.) 90 Tab 3   • Insulin Pen Needle (PEN NEEDLES 5/16\") 30G X 8 MM Misc 1 Unknown by Does not apply route every day. To be used with insulin pen 100 Each 3   • hydrochlorothiazide (MICROZIDE) 12.5 MG capsule Take 1 Cap by mouth every day. (Patient not taking: Reported on 12/27/2020) 30 Cap 0     No current facility-administered medications for this visit.       Social History     Tobacco Use   • Smoking status: Never Smoker   • Smokeless tobacco: Never Used " "  Substance Use Topics   • Alcohol use: Yes     Alcohol/week: 1.2 oz     Types: 2 Cans of beer per week     Comment: yearly   • Drug use: No       Family Status   Relation Name Status   • Mo  Alive   • Fa     • Sis ##Sis1 Alive     Family History   Problem Relation Age of Onset   • Cancer Mother         Breast cancer   • Heart Disease Father         CHF       Review of Systems:   Constitutional: Negative for fever, chills, weight loss and malaise/fatigue.   Respiratory: Negative for cough, sputum production, shortness of breath and wheezing.    Cardiovascular: Negative for chest pain, palpitations, orthopnea and leg swelling.  Positive for uncontrolled hypertension  Gastrointestinal: Negative for heartburn, nausea, vomiting and abdominal pain.   Genitourinary/Renal: Negative for dysuria, urgency and frequency.   Musculoskeletal: Negative for myalgias, back pain and joint pain.   Skin: Negative for rash and itching.   Neurological: Negative for dizziness, tingling, tremors, sensory change, focal weakness and headaches.   Endo/Heme/Allergies: Positive per HPI      Exam:  BP (!) 176/110   Pulse 87   Temp 36.2 °C (97.1 °F) (Temporal)   Resp 16   Ht 1.727 m (5' 8\")   Wt 117 kg (258 lb)   SpO2 93%   General:  Well nourished, well developed male in NAD  Skin: warm, dry, intact, no evidence of rash or concerning lesions  Head: is grossly normal.  HEENT: eyes clear, conjunctiva normal, PERRLA,TMs normal; bilaterally  Neck: Supple without JVD or bruit. Thyroid is not enlarged.  Pulmonary: Clear to ausculation. Normal effort. No rales, ronchi, or wheezing.  Cardiovascular: Regular rate and rhythm without murmur. Carotid and radial pulses are intact and equal bilaterally.  Extremities: no clubbing, cyanosis, or edema.  Psych/mental: no depression, anxiety, hallucinations  Neuro: alert, intact, CN 2-12 grossly intact    Medical decision-making and discussion:    As mentioned above, medications adjusted, will " increase Trulicity to 1.5 mg once a week as well as Levemir insulin to 20 units daily, he will continue on the Metformin as previously prescribed.  He is also going to restart amlodipine as previously prescribed and continue on lisinopril.  He is to follow-up in 3 months with labs done before visit.  Strongly encouraged healthy lifestyle modifications        Assessment/Plan:  Joey was seen today for follow-up.    Diagnoses and all orders for this visit:    Type 2 diabetes mellitus with other specified complication, with long-term current use of insulin (HCC)  -     HEMOGLOBIN A1C; Future    Essential hypertension    Mixed hyperlipidemia    Microalbuminuria  -     MICROALBUMIN CREAT RATIO URINE; Future    Other orders  -     amLODIPine (NORVASC) 10 MG Tab; Take 1 tablet by mouth every day.  -     Dulaglutide (TRULICITY) 1.5 MG/0.5ML Solution Pen-injector; Inject 0.5 mL under the skin every 7 days.         Return in about 3 months (around 5/16/2021) for Follow-up, Discuss Labs.    Please note that this dictation was created using voice recognition software. I have made every reasonable attempt to correct obvious errors, but I expect that there are errors of grammar and possibly content that I did not discover before finalizing the note.

## 2021-02-17 NOTE — PATIENT INSTRUCTIONS
Will increase the dose of trulicity    Increase the insulin to 20 units    BP  Check in 3 days    Repeat labs in 3 months    Will add amlodipine 10 mg, continue on lisinopril    Start Allegra or Zyrtec, and start taking mucinex

## 2021-03-08 ENCOUNTER — OFFICE VISIT (OUTPATIENT)
Dept: NEUROLOGY | Facility: MEDICAL CENTER | Age: 56
End: 2021-03-08
Attending: NURSE PRACTITIONER
Payer: COMMERCIAL

## 2021-03-08 VITALS
HEART RATE: 84 BPM | TEMPERATURE: 98.1 F | BODY MASS INDEX: 38.42 KG/M2 | WEIGHT: 253.53 LBS | SYSTOLIC BLOOD PRESSURE: 136 MMHG | OXYGEN SATURATION: 95 % | DIASTOLIC BLOOD PRESSURE: 80 MMHG | HEIGHT: 68 IN | RESPIRATION RATE: 16 BRPM

## 2021-03-08 DIAGNOSIS — I10 ESSENTIAL HYPERTENSION: ICD-10-CM

## 2021-03-08 DIAGNOSIS — I61.3 PONTINE HEMORRHAGE (HCC): ICD-10-CM

## 2021-03-08 DIAGNOSIS — E78.2 MIXED HYPERLIPIDEMIA: ICD-10-CM

## 2021-03-08 DIAGNOSIS — Z79.4 TYPE 2 DIABETES MELLITUS WITH OTHER SPECIFIED COMPLICATION, WITH LONG-TERM CURRENT USE OF INSULIN (HCC): ICD-10-CM

## 2021-03-08 DIAGNOSIS — E11.69 TYPE 2 DIABETES MELLITUS WITH OTHER SPECIFIED COMPLICATION, WITH LONG-TERM CURRENT USE OF INSULIN (HCC): ICD-10-CM

## 2021-03-08 DIAGNOSIS — G47.33 OSA (OBSTRUCTIVE SLEEP APNEA): ICD-10-CM

## 2021-03-08 PROCEDURE — 99214 OFFICE O/P EST MOD 30 MIN: CPT | Performed by: NURSE PRACTITIONER

## 2021-03-08 PROCEDURE — 99212 OFFICE O/P EST SF 10 MIN: CPT | Performed by: NURSE PRACTITIONER

## 2021-03-08 ASSESSMENT — ENCOUNTER SYMPTOMS
COUGH: 1
BACK PAIN: 0
CHILLS: 0
DIZZINESS: 0
DEPRESSION: 1
HEARTBURN: 0
PALPITATIONS: 0
NAUSEA: 0
NERVOUS/ANXIOUS: 0
NECK PAIN: 0
BLURRED VISION: 0
BRUISES/BLEEDS EASILY: 0
FEVER: 0
HEADACHES: 0
VOMITING: 0

## 2021-03-08 ASSESSMENT — FIBROSIS 4 INDEX: FIB4 SCORE: 1.06

## 2021-03-09 NOTE — PATIENT INSTRUCTIONS
Blood pressure goal less than 130/80.     Get CT scan, ordered at last visit, call [phone] to schedule     Hypertension, Adult  Hypertension is another name for high blood pressure. High blood pressure forces your heart to work harder to pump blood. This can cause problems over time.  There are two numbers in a blood pressure reading. There is a top number (systolic) over a bottom number (diastolic). It is best to have a blood pressure that is below 130/80   Healthy choices can help lower your blood pressure, or you may need medicine to help lower it.  What are the causes?  The cause of this condition is not known. Some conditions may be related to high blood pressure.  What increases the risk?  · Smoking.  · Having type 2 diabetes mellitus, high cholesterol, or both.  · Not getting enough exercise or physical activity.  · Being overweight.  · Having too much fat, sugar, calories, or salt (sodium) in your diet.  · Drinking too much alcohol.  · Having long-term (chronic) kidney disease.  · Having a family history of high blood pressure.  · Age. Risk increases with age.  · Race. You may be at higher risk if you are .  · Gender. Men are at higher risk than women before age 45. After age 65, women are at higher risk than men.  · Having obstructive sleep apnea.  · Stress.  What are the signs or symptoms?  · High blood pressure may not cause symptoms. Very high blood pressure (hypertensive crisis) may cause:  ? Headache.  ? Feelings of worry or nervousness (anxiety).  ? Shortness of breath.  ? Nosebleed.  ? A feeling of being sick to your stomach (nausea).  ? Throwing up (vomiting).  ? Changes in how you see.  ? Very bad chest pain.  ? Seizures.  How is this treated?  · This condition is treated by making healthy lifestyle changes, such as:  ? Eating healthy foods.  ? Exercising more.  ? Drinking less alcohol.  · Your health care provider may prescribe medicine if lifestyle changes are not enough to  get your blood pressure under control, and if:  ? Your top number is above 130.  ? Your bottom number is above 80.  · Your personal target blood pressure may vary.  Follow these instructions at home:  Eating and drinking    · If told, follow the DASH eating plan. To follow this plan:  ? Fill one half of your plate at each meal with fruits and vegetables.  ? Fill one fourth of your plate at each meal with whole grains. Whole grains include whole-wheat pasta, brown rice, and whole-grain bread.  ? Eat or drink low-fat dairy products, such as skim milk or low-fat yogurt.  ? Fill one fourth of your plate at each meal with low-fat (lean) proteins. Low-fat proteins include fish, chicken without skin, eggs, beans, and tofu.  ? Avoid fatty meat, cured and processed meat, or chicken with skin.  ? Avoid pre-made or processed food.  · Eat less than 1,500 mg of salt each day.  · Do not drink alcohol if:  ? Your doctor tells you not to drink.  ? You are pregnant, may be pregnant, or are planning to become pregnant.  · If you drink alcohol:  ? Limit how much you use to:  § 0-1 drink a day for women.  § 0-2 drinks a day for men.  ? Be aware of how much alcohol is in your drink. In the [place] one drink equals one 12 oz bottle of beer (355 mL), one 5 oz glass of wine (148 mL), or one 1½ oz glass of hard liquor (44 mL).  Lifestyle    · Work with your doctor to stay at a healthy weight or to lose weight. Ask your doctor what the best weight is for you.  · Get at least 30 minutes of exercise most days of the week. This may include walking, swimming, or biking.  · Get at least 30 minutes of exercise that strengthens your muscles (resistance exercise) at least 3 days a week. This may include lifting weights or doing Pilates.  · Do not use any products that contain nicotine or tobacco, such as cigarettes, e-cigarettes, and chewing tobacco. If you need help quitting, ask your doctor.  · Check your blood pressure at home as told by your  doctor.  · Keep all follow-up visits as told by your doctor. This is important.  Medicines  · Take over-the-counter and prescription medicines only as told by your doctor. Follow directions carefully.  · Do not skip doses of blood pressure medicine. The medicine does not work as well if you skip doses. Skipping doses also puts you at risk for problems.  · Ask your doctor about side effects or reactions to medicines that you should watch for.  Contact a doctor if you:  · Think you are having a reaction to the medicine you are taking.  · Have headaches that keep coming back (recurring).  · Feel dizzy.  · Have swelling in your ankles.  · Have trouble with your vision.  Get help right away if you:  · Get a very bad headache.  · Start to feel mixed up (confused).  · Feel weak or numb.  · Feel faint.  · Have very bad pain in your:  ? Chest.  ? Belly (abdomen).  · Throw up more than once.  · Have trouble breathing.  Summary  · Hypertension is another name for high blood pressure.  · High blood pressure forces your heart to work harder to pump blood.  · For most people, a normal blood pressure is less than 120/80.  · Making healthy choices can help lower blood pressure. If your blood pressure does not get lower with healthy choices, you may need to take medicine.  This information is not intended to replace advice given to you by your health care provider. Make sure you discuss any questions you have with your health care provider.  Document Released: 06/05/2009 Document Revised: 08/28/2019 Document Reviewed: 08/28/2019  [employer] Patient Education © 2020 [employer]  Stroke Prevention  Some medical conditions and lifestyle choices can lead to a higher risk for a stroke. You can help to prevent a stroke by making nutrition, lifestyle, and other changes.  What nutrition changes can be made?    · Eat healthy foods.  ? Choose foods that are high in fiber. These include:  § Fresh fruits.  § Fresh vegetables.  § Whole  grains.  ? Eat at least 5 or more servings of fruits and vegetables each day. Try to fill half of your plate at each meal with fruits and vegetables.  ? Choose lean protein foods. These include:  § Lowfat (lean) cuts of meat.  § Chicken without skin.  § Fish.  § Tofu.  § Beans.  § Nuts.  ? Eat low-fat dairy products.  ? Avoid foods that:  § Are high in salt (sodium).  § Have saturated fat.  § Have trans fat.  § Have cholesterol.  § Are processed.  § Are premade.  · Follow eating guidelines as told by your doctor. These may include:  ? Reducing how many calories you eat and drink each day.  ? Limiting how much salt you eat or drink each day to 1,500 milligrams (mg).  ? Using only healthy fats for cooking. These include:  § Olive oil.  § Canola oil.  § Sunflower oil.  ? Counting how many carbohydrates you eat and drink each day.  What lifestyle changes can be made?  · Try to stay at a healthy weight. Talk to your doctor about what a good weight is for you.  · Get at least 30 minutes of moderate physical activity at least 5 days a week. This can include:  ? Fast walking.  ? Biking.  ? Swimming.  · Do not use any products that have nicotine or tobacco. This includes cigarettes and e-cigarettes. If you need help quitting, ask your doctor. Avoid being around tobacco smoke in general.  · Limit how much alcohol you drink to no more than 1 drink a day for nonpregnant women and 2 drinks a day for men. One drink equals 12 oz of beer, 5 oz of wine, or 1½ oz of hard liquor.  · Do not use drugs.  · Avoid taking birth control pills. Talk to your doctor about the risks of taking birth control pills if:  ? You are over 35 years old.  ? You smoke.  ? You get migraines.  ? You have had a blood clot.  What other changes can be made?  · Manage your cholesterol.  ? It is important to eat a healthy diet.  ? If your cholesterol cannot be managed through your diet, you may also need to take medicines. Take medicines as told by your  "doctor.  · Manage your diabetes.  ? It is important to eat a healthy diet and to exercise regularly.  ? If your blood sugar cannot be managed through diet and exercise, you may need to take medicines. Take medicines as told by your doctor.  · Control your high blood pressure (hypertension).  ? Try to keep your blood pressure below 130/80. This can help lower your risk of stroke.  ? It is important to eat a healthy diet and to exercise regularly.  ? If your blood pressure cannot be managed through diet and exercise, you may need to take medicines. Take medicines as told by your doctor.  ? Ask your doctor if you should check your blood pressure at home.  ? Have your blood pressure checked every year. Do this even if your blood pressure is normal.  · Talk to your doctor about getting checked for a sleep disorder. Signs of this can include:  ? Snoring a lot.  ? Feeling very tired.  · Take over-the-counter and prescription medicines only as told by your doctor. These may include aspirin or blood thinners (antiplatelets or anticoagulants).  · Make sure that any other medical conditions you have are managed.  Where to find more information  · American Stroke Association: www.strokeassociation.org  · National Stroke Association: www.stroke.org  Get help right away if:  · You have any symptoms of stroke. \"BE FAST\" is an easy way to remember the main warning signs:  ? B - Balance. Signs are dizziness, sudden trouble walking, or loss of balance.  ? E - Eyes. Signs are trouble seeing or a sudden change in how you see.  ? F - Face. Signs are sudden weakness or loss of feeling of the face, or the face or eyelid drooping on one side.  ? A - Arms. Signs are weakness or loss of feeling in an arm. This happens suddenly and usually on one side of the body.  ? S - Speech. Signs are sudden trouble speaking, slurred speech, or trouble understanding what people say.  ? T - Time. Time to call emergency services. Write down what time " symptoms started.  · You have other signs of stroke, such as:  ? A sudden, very bad headache with no known cause.  ? Feeling sick to your stomach (nausea).  ? Throwing up (vomiting).  ? Jerky movements you cannot control (seizure).  These symptoms may represent a serious problem that is an emergency. Do not wait to see if the symptoms will go away. Get medical help right away. Call your local emergency services (911 in the [place] Do not drive yourself to the hospital.  Summary  · You can prevent a stroke by eating healthy, exercising, not smoking, drinking less alcohol, and treating other health problems, such as diabetes, high blood pressure, or high cholesterol.  · Do not use any products that contain nicotine or tobacco, such as cigarettes and e-cigarettes.  · Get help right away if you have any signs or symptoms of a stroke.  This information is not intended to replace advice given to you by your health care provider. Make sure you discuss any questions you have with your health care provider.  Document Released: 06/18/2013 Document Revised: 02/13/2020 Document Reviewed: 03/21/2018  [employer] Patient Education © 2020 [employer]

## 2021-03-09 NOTE — PROGRESS NOTES
Subjective:   HPI  Joey Dawson  is a 55 y.o. right handed male who presents to [facility] for follow up.    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         He presented to [facility]  with complaints of headache and right sided weakness.  Blood pressure on admission 189/97.      He had consistently been using his CPAP but it was not as effective as it was in the past; he actually had a repeat sleep study the night prior to his admission.      PMH includes History of stroke, bursitis of left hip, DESTIN, DM,  HLD, obesity and HTN.  Social History:  No history of tobacco use, drinks 2 cans of beer weekly, denies drug use.  He works in sales.         Patient is here alone today.  Blood pressure at home 141/84, 149/92. He just got a new CPAP and feels like that is helping  He did not have CT scan yet.  He has been back at work since  and has not had any problems.      Review of Systems   Constitutional: Negative for chills and fever.   HENT: Negative for hearing loss.    Eyes: Negative for blurred vision.   Respiratory: Positive for cough.    Cardiovascular: Negative for chest pain and palpitations.   Gastrointestinal: Negative for heartburn, nausea and vomiting.   Genitourinary: Negative for dysuria.   Musculoskeletal: Negative for back pain and neck pain.   Skin: Negative for rash.   Neurological: Negative for dizziness and headaches.   Endo/Heme/Allergies: Does not bruise/bleed easily.   Psychiatric/Behavioral: Positive for depression. The patient is not nervous/anxious.         Dog  about 2-3 weeks ago.      Current Outpatient Medications on File Prior to Visit   Medication Sig Dispense Refill   • amLODIPine (NORVASC) 10 MG Tab Take 1 tablet by mouth every day. 90 tablet 3   • atorvastatin (LIPITOR) 40 MG Tab Take 1 Tab by mouth every bedtime. 90 Tab 1   • Dulaglutide 0.75 MG/0.5ML Solution Pen-injector Inject 0.5 mL as instructed  "every 7 days. 6 mL 3   • insulin detemir (LEVEMIR) 100 UNIT/ML injection PEN Inject 15 Units as instructed every evening. 5 Each 3   • metFORMIN ER (GLUCOPHAGE XR) 500 MG TABLET SR 24 HR Take 2 Tabs by mouth 2 times a day. (Patient taking differently: Take 1,000 mg by mouth 2 times a day. 2 tablets = 1000 mg) 360 Tab 3   • lisinopril (PRINIVIL) 40 MG tablet Take 1 Tab by mouth every day. 90 Tab 3   • sertraline (ZOLOFT) 50 MG Tab Take 1 Tab by mouth every day. (Patient taking differently: Take 50 mg by mouth every evening.) 90 Tab 3   • hydrochlorothiazide (MICROZIDE) 12.5 MG capsule Take 1 Cap by mouth every day. (Patient not taking: Reported on 12/27/2020) 30 Cap 0   • Insulin Pen Needle (PEN NEEDLES 5/16\") 30G X 8 MM Misc 1 Unknown by Does not apply route every day. To be used with insulin pen 100 Each 3     No current facility-administered medications on file prior to visit.     Past Medical History:   Diagnosis Date   • Hyperlipidemia    • Hypertension    • Stroke (HCC)    • Type II or unspecified type diabetes mellitus without mention of complication, not stated as uncontrolled           Objective:     I personally reviewed imaging below and agree with the findings  CT head 11/9/2020  1.  There is a focal area of increased attenuation in the right man most consistent with a small area of hemorrhage.  2.  There is an old infarct in the left posterior medial occipital lobe.     CTA head 11/9/2020  1.  CT angiogram of the [place] demonstrate no significant arterial stenosis or proximal occlusion.  CT Perfusion  1.  Cerebral blood flow less than 30% likely representing completed infarct = 11 mL.     2.  T Max more than 6 seconds likely representing combination of completed infarct and ischemia = 23 mL.     3.  Mismatched volume likely representing ischemic brain/penumbra = 12 mL     4.  Please note that the cerebral perfusion was performed on the limited brain tissue around the basal ganglia region. " "Infarct/ischemia outside the CT perfusion sections can be missed in this study.     CTA neck  1.  CT angiogram of the neck demonstrating no significant carotid artery stenosis or occlusion.  2.  There is a mildly enlarged heterogeneous thyroid gland with multiple thyroid nodules which could be further assessed on a nonemergent basis with thyroid ultrasound.     MRI brain 11/10/2020  1.  There is an approximately 13 x 16 mm sized acute right midbrain/cerebral peduncle hemorrhage.  2.  There are areas of small chronic hemorrhages in the basal ganglia and right medial parietal lobe.  3.  Large encephalomalacia with chronic hemorrhagic products in the left occipital lobe.  4.  Multiple chronic small infarcts.  5.  Mild chronic microvascular ischemic disease.  6.  Mild cerebral volume loss.     TTE:  LVEF 60%, LA not well visualized,      Stroke Labs: 0.80, LDL 72, A1C 10.7    Encounter Vitals  Standard Vitals  Vitals  Blood Pressure: 136/80  Temperature: 36.7 °C (98.1 °F)  Temp src: Temporal  Pulse: 84  Respiration: 16  Pulse Oximetry: 95 %  Height: 172.7 cm (5' 8\")  Weight: 115 kg (253 lb 8.5 oz)  Encounter Vitals  Temperature: 36.7 °C (98.1 °F)  Temp src: Temporal  Blood Pressure: 136/80  Pulse: 84  Respiration: 16  Pulse Oximetry: 95 %  Weight: 115 kg (253 lb 8.5 oz)  Height: 172.7 cm (5' 8\")  BMI (Calculated): 38.55    Physical Exam    Constitutional:  Alert, no apparent distress,  Psych:   mood and affect WNL  Neuro:  Oriented X 4, speech fluent, naming and memory intact  Muskuloskeletal:  Moves all extremities equally, strength 5/5 bilaterally, no drift  CN II: Visual fields are full to confrontation. Fundoscopic exam is normal with sharp discs and no vascular changes. Pupils are 4 mm and briskly reactive to light.   CN III, IV, VI  EOMs intact, no ptosis  CN V: Facial sensation is intact to pinprick in all 3 divisions bilaterally. Corneal responses are intact.  CN VII: Face is symmetric with normal eye closure " and smile.  CN VII: Hearing is normal to rubbing fingers  CN IX, X: Palate elevates symmetrically. Phonation is normal.  CN XI: Head turning and shoulder shrug are intact  CN XII: Tongue is midline with normal movements and no atrophy.                           Sensation to PP equal bilaterally                 No limb ataxia with finger to nose and heel to shin                 Ambulates with steady gait.                 Rhomberg negative                Biceps,brachioradialis, tricep, patellar and ankle reflex all 2+     Cardiovascular:    S1S2, no abnormal rhythm auscultated, no peripheral edema  Neck:                     No carotid bruits noted   Pulmonary:            Respirations easy, lungs clear to auscultation all fields.     Skin:                     No obvious rashes.    Iniital NIHSS  3-4      Current NIHSS    1a. LOC: 0  1b. LOC Questions: 0  1c. LOC Commands: 0  2. Best Gaze:0  3. Visual Fields: 0  4. Facial Paresis: 0  5a. Motor arm left: 0  5b. Motor arm right: 0  6a. Motor leg left: 0  6b. Motor leg right: 0  7. Sensory: 0  8. Best Language: 0  9. Limb Ataxia: 0  10. Dysarthria: 0  11. Extinction/Inattention: 0    Total Score Current  0          Current mRS  0     Assessment/Plan:   1. 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  ICH Score  Crosby Coma Scale: 0    3-4 (2 points), 5-12 (1 point), 13-15 (0 points)  Age greater than 80:0   No (0 points), yes (1 point)  ICH volume >/= 30 mL: No (0 points), yes (1 point)  Intraventricular hemorrhage: No (0 points), yes (1 point)  Infratentorial origin of hemorrhage  1 No (0 points), yes (1 point)     Total score: 1     1 point: 13% mortality  2 points: 26% mortality  3 points: 72% mortality  4 points: 97% mortality  >/=5 points: 100% mortality     Stroke risk factors include HTN, HLD, DM DESTIN  Avoid NSAIDS, avoid alcohol     Repeat CT of head , ordered at last visit, I gave him the # today for  scheduling. I will call him when I get results.  Given MRI evidence of remote infarcts with stroke risk factors, he should be on 81mg ASA for secondary prevention.       2. Essential hypertension  Blood pressure goal less than 130/80, above goal today:   136/80, he needs close follow up with PCP for optimal control.  Discussed keeping blood pressure log.           3. Mixed hyperlipidemia  LDL goal < 70, current 72, continue  atorvastatin 40mg per SPARCL trial for stroke prevention,  discussed medication side effects, will need follow up with primary care evaluate liver function and intervals and refill     Exercise at least 30 minutes daily, avoid red meat, fried foods, butter, cheese.   Eat 5-6 servings of vegetables and fruits daily, choose lean white meat without skin (chicken, turkey, white fish)--baked, broiled or grilled.        4. DESTIN (obstructive sleep apnea)   Continue CPAP.      5. DMII     A1C goal less than 7.0, current 10.7, follow up with endocrinology for optimal blood glucose control.      I will see him back in 6 months for risk factor assessment.         I spent a total of 25  minutes caring for patient,  my time includes counseling, review of systems, HPI and assessment, review of images, labs and testing as above.  I reviewed the hospital records, PMH, social and family history.   I have counseled patient on stroke prevention strategies, stroke symptoms and mimics.  Diet and exercise modifications.  We discussed medication side effects and instructions.

## 2021-03-17 ENCOUNTER — HOSPITAL ENCOUNTER (OUTPATIENT)
Dept: RADIOLOGY | Facility: MEDICAL CENTER | Age: 56
End: 2021-03-17
Attending: NURSE PRACTITIONER
Payer: COMMERCIAL

## 2021-03-17 DIAGNOSIS — I61.3 PONTINE HEMORRHAGE (HCC): ICD-10-CM

## 2021-03-17 PROCEDURE — 70450 CT HEAD/BRAIN W/O DYE: CPT

## 2021-04-24 ENCOUNTER — HOSPITAL ENCOUNTER (OUTPATIENT)
Dept: LAB | Facility: MEDICAL CENTER | Age: 56
End: 2021-04-24
Attending: NURSE PRACTITIONER
Payer: COMMERCIAL

## 2021-04-24 DIAGNOSIS — E11.69 TYPE 2 DIABETES MELLITUS WITH OTHER SPECIFIED COMPLICATION, WITH LONG-TERM CURRENT USE OF INSULIN (HCC): ICD-10-CM

## 2021-04-24 DIAGNOSIS — R80.9 MICROALBUMINURIA: ICD-10-CM

## 2021-04-24 DIAGNOSIS — Z79.4 TYPE 2 DIABETES MELLITUS WITH OTHER SPECIFIED COMPLICATION, WITH LONG-TERM CURRENT USE OF INSULIN (HCC): ICD-10-CM

## 2021-04-24 LAB
CREAT UR-MCNC: 187.17 MG/DL
EST. AVERAGE GLUCOSE BLD GHB EST-MCNC: 321 MG/DL
HBA1C MFR BLD: 12.8 % (ref 4–5.6)
MICROALBUMIN UR-MCNC: 133.8 MG/DL
MICROALBUMIN/CREAT UR: 715 MG/G (ref 0–30)

## 2021-04-24 PROCEDURE — 83036 HEMOGLOBIN GLYCOSYLATED A1C: CPT

## 2021-04-24 PROCEDURE — 36415 COLL VENOUS BLD VENIPUNCTURE: CPT

## 2021-04-24 PROCEDURE — 82570 ASSAY OF URINE CREATININE: CPT

## 2021-04-24 PROCEDURE — 82043 UR ALBUMIN QUANTITATIVE: CPT

## 2021-04-29 ENCOUNTER — OFFICE VISIT (OUTPATIENT)
Dept: MEDICAL GROUP | Facility: PHYSICIAN GROUP | Age: 56
End: 2021-04-29
Payer: COMMERCIAL

## 2021-04-29 VITALS
HEIGHT: 68 IN | HEART RATE: 76 BPM | TEMPERATURE: 98.1 F | DIASTOLIC BLOOD PRESSURE: 86 MMHG | WEIGHT: 235 LBS | OXYGEN SATURATION: 98 % | SYSTOLIC BLOOD PRESSURE: 140 MMHG | BODY MASS INDEX: 35.61 KG/M2 | RESPIRATION RATE: 16 BRPM

## 2021-04-29 DIAGNOSIS — E11.65 UNCONTROLLED TYPE 2 DIABETES MELLITUS WITH HYPERGLYCEMIA (HCC): ICD-10-CM

## 2021-04-29 PROCEDURE — 99213 OFFICE O/P EST LOW 20 MIN: CPT | Performed by: NURSE PRACTITIONER

## 2021-04-29 RX ORDER — DULAGLUTIDE 1.5 MG/.5ML
INJECTION, SOLUTION SUBCUTANEOUS
COMMUNITY
Start: 2021-03-26 | End: 2021-07-22

## 2021-04-29 RX ORDER — ASPIRIN 81 MG/1
81 TABLET, CHEWABLE ORAL DAILY
COMMUNITY

## 2021-04-29 ASSESSMENT — FIBROSIS 4 INDEX: FIB4 SCORE: 1.06

## 2021-04-29 NOTE — PROGRESS NOTES
Chief Complaint   Patient presents with   • Lab Follow-up       HISTORY OF PRESENT ILLNESS: Patient is a 55 y.o. male established patient who presents today to discuss labs    Uncontrolled type 2 diabetes mellitus with hyperglycemia ([facility])  This is a chronic condition uncontrolled  A1c at his last visit in February was over 11 is now up to 13.8  Reports that he consistently takes his medication including Trulicity 1.5 mg once a week, Metformin 1000 mg twice daily and long-acting insulin at 20 units a day  Reports to me that his diet has not changed  He does agree to further testing, will verify if patient is perhaps type I  He will have kim 65 and C-peptide, he will be notified of results and further actions if needed  He does understand that if there is indication that he has type 1 diabetes he will need to be referred to endocrinology for further management  He is to continue on his oral medications as prescribed and will increase his long-acting insulin to 24 units  He is appropriately on ACE inhibitor as well as statin      Patient Active Problem List    Diagnosis Date Noted   • Acute hemorrhagic CVA (Pontine hemorrhage) ([facility]) 11/09/2020   • Obesity (BMI 30-39.9) 05/29/2019   • Uncontrolled type 2 diabetes mellitus with hyperglycemia (HCC) 04/09/2015   • Sleep apnea 04/09/2015   • Mixed hyperlipidemia 04/09/2015   • Essential hypertension 12/02/2020   • Thyroid nodule 12/02/2020   • DESTIN (obstructive sleep apnea) 11/10/2020   • Situational depression 10/06/2020   • Trochanteric bursitis of left hip 10/06/2020   • Soft tissue mass 05/30/2019   • ED (erectile dysfunction) 06/16/2015   • Vitamin D deficiency 04/27/2015   • RBC microcytosis 04/27/2015   • Microalbuminuria 04/27/2015   • History of CVA (cerebrovascular accident) without residual deficits 04/09/2015       Allergies:Food    Current Outpatient Medications   Medication Sig Dispense Refill   • aspirin (ASA) 81 MG Chew Tab chewable tablet Chew 81 mg every  "day.     • amLODIPine (NORVASC) 10 MG Tab Take 1 tablet by mouth every day. 90 tablet 3   • atorvastatin (LIPITOR) 40 MG Tab Take 1 Tab by mouth every bedtime. 90 Tab 1   • insulin detemir (LEVEMIR) 100 UNIT/ML injection PEN Inject 15 Units as instructed every evening. (Patient taking differently: Inject 20 Units under the skin every evening.) 5 Each 3   • metFORMIN ER (GLUCOPHAGE XR) 500 MG TABLET SR 24 HR Take 2 Tabs by mouth 2 times a day. (Patient taking differently: Take 1,000 mg by mouth 2 times a day. 2 tablets = 1000 mg) 360 Tab 3   • lisinopril (PRINIVIL) 40 MG tablet Take 1 Tab by mouth every day. 90 Tab 3   • sertraline (ZOLOFT) 50 MG Tab Take 1 Tab by mouth every day. (Patient taking differently: Take 50 mg by mouth every evening.) 90 Tab 3   • Insulin Pen Needle (PEN NEEDLES 5/16\") 30G X 8 MM Misc 1 Unknown by Does not apply route every day. To be used with insulin pen 100 Each 3   • TRULICITY 1.5 MG/0.5ML Solution Pen-injector      • hydrochlorothiazide (MICROZIDE) 12.5 MG capsule Take 1 Cap by mouth every day. (Patient not taking: Reported on 2020) 30 Cap 0     No current facility-administered medications for this visit.       Social History     Tobacco Use   • Smoking status: Never Smoker   • Smokeless tobacco: Never Used   Substance Use Topics   • Alcohol use: Yes     Alcohol/week: 1.2 oz     Types: 2 Cans of beer per week     Comment: yearly   • Drug use: No       Family Status   Relation Name Status   • Mo  Alive   • Fa     • Sis ##Sis1 Alive     Family History   Problem Relation Age of Onset   • Cancer Mother         Breast cancer   • Heart Disease Father         CHF       Review of Systems:   Constitutional: Negative for fever, chills, weight loss and malaise/fatigue.   Respiratory: Negative for cough, sputum production, shortness of breath and wheezing.    Cardiovascular: Negative for chest pain, palpitations, orthopnea and leg swelling.   Gastrointestinal: Negative for heartburn, " "nausea, vomiting and abdominal pain.   Genitourinary/Renal: Negative for dysuria, urgency and frequency.   Musculoskeletal: Negative for myalgias, back pain and joint pain.   Skin: Negative for rash and itching.   Neurological: Negative for dizziness, tingling, tremors, sensory change, focal weakness and headaches.   Endo/Heme/Allergies: Positive per HPI  Exam:  /86 (BP Location: Left arm, Patient Position: Sitting, BP Cuff Size: Adult long)   Pulse 76   Temp 36.7 °C (98.1 °F) (Temporal)   Resp 16   Ht 1.727 m (5' 8\")   Wt 107 kg (235 lb)   SpO2 98%   General:  Well nourished, well developed male in NAD  Skin: warm, dry, intact, no evidence of rash or concerning lesions  Head: is grossly normal.  HEENT: eyes clear, conjunctiva normal, PERRLA  Pulmonary: Clear to ausculation. Normal effort. No rales, ronchi, or wheezing.  Cardiovascular: Regular rate and rhythm without murmur. Carotid and radial pulses are intact and equal bilaterally.  Abdomen: soft, non-tender, positive bowel sounds  Musculoskeletal: no clubbing, cyanosis, or edema.  Psych/mental: no depression, anxiety, hallucinations  Neuro: alert, intact, CN 2-12 grossly intact    Medical decision-making and discussion:    Labs as soon as possible, he will be notified of results and further actions if needed.  We will have him increase his long-acting insulin to 24 units daily and continue on his other oral medications as well as Trulicity        Assessment/Plan:  Joey was seen today for lab follow-up.    Diagnoses and all orders for this visit:    Uncontrolled type 2 diabetes mellitus with hyperglycemia (HCC)  -     CHAVEZ-65; Future  -     C-PEPTIDE; Future         Return in about 4 months (around 8/29/2021) for Discuss Labs, Follow-up.    Please note that this dictation was created using voice recognition software. I have made every reasonable attempt to correct obvious errors, but I expect that there are errors of grammar and possibly content that " I did not discover before finalizing the note.

## 2021-05-01 ENCOUNTER — HOSPITAL ENCOUNTER (OUTPATIENT)
Dept: LAB | Facility: MEDICAL CENTER | Age: 56
End: 2021-05-01
Attending: NURSE PRACTITIONER
Payer: COMMERCIAL

## 2021-05-01 DIAGNOSIS — E11.65 UNCONTROLLED TYPE 2 DIABETES MELLITUS WITH HYPERGLYCEMIA (HCC): ICD-10-CM

## 2021-05-01 PROCEDURE — 86341 ISLET CELL ANTIBODY: CPT

## 2021-05-01 PROCEDURE — 84681 ASSAY OF C-PEPTIDE: CPT

## 2021-05-01 PROCEDURE — 36415 COLL VENOUS BLD VENIPUNCTURE: CPT

## 2021-05-05 LAB
C PEPTIDE SERPL-MCNC: 2 NG/ML (ref 0.8–3.5)
GAD65 AB SER IA-ACNC: <5 IU/ML (ref 0–5)

## 2021-05-10 DIAGNOSIS — E11.65 UNCONTROLLED TYPE 2 DIABETES MELLITUS WITH HYPERGLYCEMIA (HCC): ICD-10-CM

## 2021-06-16 DIAGNOSIS — E78.2 MIXED HYPERLIPIDEMIA: ICD-10-CM

## 2021-06-16 RX ORDER — ATORVASTATIN CALCIUM 40 MG/1
TABLET, FILM COATED ORAL
Qty: 90 TABLET | Refills: 3 | Status: SHIPPED | OUTPATIENT
Start: 2021-06-16 | End: 2021-09-15 | Stop reason: SDUPTHER

## 2021-07-22 RX ORDER — DULAGLUTIDE 1.5 MG/.5ML
INJECTION, SOLUTION SUBCUTANEOUS
Qty: 12 EACH | Refills: 1 | Status: SHIPPED | OUTPATIENT
Start: 2021-07-22 | End: 2021-08-24

## 2021-07-22 NOTE — TELEPHONE ENCOUNTER
Patient has recently been seen by PCP within the last 6 months per protocol (4/21). Will refill DM supplies for 6 months.  Lab Results   Component Value Date/Time    HBA1C 12.8 (H) 04/24/2021 09:44 AM      Lab Results   Component Value Date/Time    MALBCRT 715 (H) 04/24/2021 09:44 AM    MICROALBUR 133.8 04/24/2021 09:44 AM      Lab Results   Component Value Date/Time    ALKPHOSPHAT 105 (H) 02/06/2021 08:56 AM    ASTSGOT 16 02/06/2021 08:56 AM    ALTSGPT 18 02/06/2021 08:56 AM    TBILIRUBIN 0.4 02/06/2021 08:56 AM

## 2021-07-28 ENCOUNTER — OFFICE VISIT (OUTPATIENT)
Dept: ENDOCRINOLOGY | Facility: MEDICAL CENTER | Age: 56
End: 2021-07-28
Attending: NURSE PRACTITIONER
Payer: COMMERCIAL

## 2021-07-28 VITALS
RESPIRATION RATE: 14 BRPM | DIASTOLIC BLOOD PRESSURE: 76 MMHG | WEIGHT: 254 LBS | HEIGHT: 68 IN | BODY MASS INDEX: 38.49 KG/M2 | OXYGEN SATURATION: 94 % | HEART RATE: 93 BPM | SYSTOLIC BLOOD PRESSURE: 136 MMHG

## 2021-07-28 DIAGNOSIS — E55.9 VITAMIN D DEFICIENCY: ICD-10-CM

## 2021-07-28 DIAGNOSIS — R80.9 MICROALBUMINURIA: ICD-10-CM

## 2021-07-28 DIAGNOSIS — E11.65 UNCONTROLLED TYPE 2 DIABETES MELLITUS WITH HYPERGLYCEMIA (HCC): ICD-10-CM

## 2021-07-28 DIAGNOSIS — I10 ESSENTIAL HYPERTENSION: ICD-10-CM

## 2021-07-28 DIAGNOSIS — E04.1 THYROID NODULE: ICD-10-CM

## 2021-07-28 DIAGNOSIS — E11.69 HYPERLIPIDEMIA ASSOCIATED WITH TYPE 2 DIABETES MELLITUS (HCC): ICD-10-CM

## 2021-07-28 DIAGNOSIS — E78.5 HYPERLIPIDEMIA ASSOCIATED WITH TYPE 2 DIABETES MELLITUS (HCC): ICD-10-CM

## 2021-07-28 LAB
HBA1C MFR BLD: 11.4 % (ref 0–5.6)
INT CON NEG: NEGATIVE
INT CON POS: POSITIVE

## 2021-07-28 PROCEDURE — 83036 HEMOGLOBIN GLYCOSYLATED A1C: CPT | Performed by: NURSE PRACTITIONER

## 2021-07-28 PROCEDURE — 99214 OFFICE O/P EST MOD 30 MIN: CPT | Performed by: NURSE PRACTITIONER

## 2021-07-28 PROCEDURE — 99212 OFFICE O/P EST SF 10 MIN: CPT | Performed by: NURSE PRACTITIONER

## 2021-07-28 ASSESSMENT — FIBROSIS 4 INDEX: FIB4 SCORE: 1.077496047522358132

## 2021-07-28 NOTE — PROGRESS NOTES
CHIEF COMPLAINT: Patient is here for follow up of Type 2 Diabetes Mellitus    HPI:     Joey Dawson is a 56 y.o. male with Type 2 Diabetes Mellitus here for follow up.    Labs from 2021 HbA1c is     BG Diary:21  {GLUCOSE TESTIN}    Weight {Weight change:72590}    Diabetes Complications   Retinopathy: No known retinopathy.  Last eye exam: {MONTH:81568} {YEAR:71269}  Neuropathy: Denies paresthesias or numbness in hands or feet. Denies any foot wounds.  Exercise: Minimal.  Diet: Fair.  Patient's medications, allergies, and social histories were reviewed and updated as appropriate.    ROS:     CONS:     No fever, no chills   EYES:     No diplopia, no blurry vision   CV:           No chest pain, no palpitations   PULM:     No SOB, no cough, no hemoptysis.   GI:            No nausea, no vomiting, no diarrhea, no constipation   ENDO:     No polyuria, no polydipsia, no heat intolerance, no cold intolerance       Past Medical History:  Problem List:  2020: Essential hypertension  2020: Thyroid nodule  2020: DESTIN (obstructive sleep apnea)  2020: Acute hemorrhagic CVA (Pontine hemorrhage) (HCC)  2020: Elevated troponin  2020-10: Situational depression  2020-10: Trochanteric bursitis of left hip  2019: Soft tissue mass  2019: Obesity (BMI 30-39.9)  2015: ED (erectile dysfunction)  2015: Vitamin D deficiency  2015: RBC microcytosis  2015: Microalbuminuria  2015: Uncontrolled type 2 diabetes mellitus with hyperglycemia   (HCC)  2015: Hypertensive emergency  2015: Sleep apnea  2015: Mixed hyperlipidemia  2015: History of CVA (cerebrovascular accident) without residual   deficits      Past Surgical History:  No past surgical history on file.     Allergies:  Food     Social History:  Social History     Tobacco Use   • Smoking status: Never Smoker   • Smokeless tobacco: Never Used   Vaping Use   • Vaping Use: Never used   Substance Use Topics   • Alcohol use: Yes      "Alcohol/week: 1.2 oz     Types: 2 Cans of beer per week     Comment: yearly   • Drug use: No        Family History:   family history includes Cancer in his mother; Heart Disease in his father.      PHYSICAL EXAM:   OBJECTIVE:  Vital signs: /76 (BP Location: Left arm, Patient Position: Sitting, BP Cuff Size: Adult)   Pulse 93   Resp 14   Ht 1.727 m (5' 8\")   Wt 115 kg (254 lb)   SpO2 94%   BMI 38.62 kg/m²   GENERAL: Well-developed, well-nourished in no apparent distress.   EYE:  No ocular asymmetry, PERRLA  HENT: Pink, moist mucous membranes.    NECK: No thyromegaly.   CARDIOVASCULAR:  No murmurs  LUNGS: Clear breath sounds  ABDOMEN: Soft, nontender   EXTREMITIES: No clubbing, cyanosis, or edema.   NEUROLOGICAL: No gross focal motor abnormalities   LYMPH: No cervical adenopathy palpated.   SKIN: No rashes, lesions.     Labs:  Lab Results   Component Value Date/Time    HBA1C 11.4 (A) 07/28/2021 02:52 PM        Lab Results   Component Value Date/Time    WBC 6.3 02/06/2021 08:56 AM    RBC 5.34 02/06/2021 08:56 AM    HEMOGLOBIN 14.2 02/06/2021 08:56 AM    MCV 82.6 02/06/2021 08:56 AM    MCH 26.6 (L) 02/06/2021 08:56 AM    MCHC 32.2 (L) 02/06/2021 08:56 AM    RDW 42.1 02/06/2021 08:56 AM    MPV 10.5 02/06/2021 08:56 AM       Lab Results   Component Value Date/Time    SODIUM 130 (L) 02/06/2021 08:56 AM    POTASSIUM 4.1 02/06/2021 08:56 AM    CHLORIDE 96 02/06/2021 08:56 AM    CO2 24 02/06/2021 08:56 AM    ANION 10.0 02/06/2021 08:56 AM    GLUCOSE 269 (H) 02/06/2021 08:56 AM    BUN 18 02/06/2021 08:56 AM    CREATININE 0.92 02/06/2021 08:56 AM    CALCIUM 8.9 02/06/2021 08:56 AM    ASTSGOT 16 02/06/2021 08:56 AM    ALTSGPT 18 02/06/2021 08:56 AM    TBILIRUBIN 0.4 02/06/2021 08:56 AM    ALBUMIN 3.9 02/06/2021 08:56 AM    TOTPROTEIN 6.6 02/06/2021 08:56 AM    GLOBULIN 2.7 02/06/2021 08:56 AM    AGRATIO 1.4 02/06/2021 08:56 AM       Lab Results   Component Value Date/Time    CHOLSTRLTOT 104 02/06/2021 0856    " TRIGLYCERIDE 76 02/06/2021 0856    HDL 33 (A) 02/06/2021 0856    LDL 56 02/06/2021 0856       Lab Results   Component Value Date/Time    MALBCRT 715 (H) 04/24/2021 09:44 AM    MICROALBUR 133.8 04/24/2021 09:44 AM        No results found for: TSHULTRASEN  No results found for: FREEDIR  No results found for: FREET3  No results found for: THYSTIMIG        ASSESSMENT/PLAN:     1. Uncontrolled type 2 diabetes mellitus with hyperglycemia (HCC)  ***      No follow-ups on file.      This patient during there office visit today was started on a new medication.  Side effects of the new medication were discussed with the patient today in the office.     Thank you kindly for allowing me to participate in the diabetes care plan for this patient.    Jose Marley MD, [facility], [facility]  07/28/21    CC:   Freda Ojeda, [email or web address].

## 2021-07-28 NOTE — PROGRESS NOTES
Chief Complaint:  Consult requested by JEEVAN Purvis for initial evaluation of Type 2 Diabetes Mellitus.    HPI:   Joey Dawson is a 56 y.o. male with Type 2 Diabetes Mellitus diagnosed in 2000. Denies hospitalizations for DKA in the past.    Patient monitors blood glucose 4 times per week. Blood glucose values range: 200-300's    Denies hypoglycemic episodes occurring in the past.  Patient denies hypoglycemic unawareness.  Patient denies episodes of severe hypoglycemia requiring third party assistance.  Patient is not wearing a medical alert bracelet or necklace.  Patient does not have a glucagon emergency kit.    Reports attending diabetes education classes when first diagnosed.  Diet: 3 meals with snacks.     Point-of-care A1c 07/28/2021: 11.4%     Ref. Range 5/1/2021 11:22   C-Peptide Latest Ref Range: 0.8 - 3.5 ng/mL 2.0      Ref. Range 5/1/2021 11:22   CHAVEZ Antibody Latest Ref Range: 0.0 - 5.0 IU/mL <5.0       Current diabetes regimen:  Levemir 24 units daily  Metformin XR 1000 mg twice daily  Trulicity 1.5 mg weekly    Diabetes Complications   Patient reports history of retinopathy.  Most recent exam shows diabetic retinopathy, nonproliferative bilateral retinas.  Reports laser eye surgery. Last eye exam: 12/15/2020.  Patient denies history of peripheral sensory neuropathy.  Patient denies numbness and/or burning in his feet. Patient denies history of foot sores.   Patient denies history of kidney damage.  Patient is currently taking lisinopril 40 mg daily.  Blood pressure currently 136/76.     Ref. Range 4/24/2021 09:44   Micro Alb Creat Ratio Latest Ref Range: 0 - 30 mg/g 715 (H)   Creatinine, Urine Latest Units: mg/dL 187.17   Microalbumin, Urine Random Latest Units: mg/dL 133.8     Patient denies history of coronary artery disease. Patient reports history of stroke 2013 & 2020 and  TIA. Denies history of PAD. Reports history of hyperlipidemia.  Currently taking atorvastatin 40 mg daily.   Patient denies myalgias or muscle cramps.     Ref. Range 2/6/2021 08:56   Cholesterol,Tot Latest Ref Range: 100 - 199 mg/dL 104   Triglycerides Latest Ref Range: 0 - 149 mg/dL 76   HDL Latest Ref Range: >=40 mg/dL 33 (A)   LDL Latest Ref Range: <100 mg/dL 56     History of 2 CVAs in the past.  The first CVA occurred in 2013.  The second CVA occurred 11/09/2020 in which the patient had a hyper tensive emergency the resulted in an intracranial hemorrhage.  No residual deficits from this injury.  Patient still maintains a full-time career and is hopeful with better glucose control but this will reduce his chances of a recurrent CVA.    Patient had CTA of neck completed during this hospitalization in November 2021.  Incidental finding of multiple thyroid nodules with the largest measuring 2 cm on the left lobe.  Patient denies dysphagia, anterior neck pain and shortness of breath.  Patient has no past history of thyroid disease.  No current thyroid panel for review at this time.    History of vitamin D deficiency.  Patient is not currently taking any vitamin D supplementation.  No current vitamin D level available for review.     Ref. Range 4/24/2015 14:17   25-Hydroxy   Vitamin D 25 Latest Ref Range: 30 - 100 ng/mL 15 (L)       ROS:     CONS:     No fever, no chills, no weight loss, no fatigue   EYES:      No diplopia, no blurry vision, no redness of eyes, no swelling of eyelids   ENT:    No hearing loss, No ear pain, No sore throat, no dysphagia, no neck swelling   CV:     No chest pain, no palpitations, no claudication, no orthopnea, no PND   PULM:    No SOB, no cough, no hemoptysis, no wheezing    GI:   No nausea, no vomiting, no diarrhea, no constipation, no bloody stools   :  Passing urine well, no dysuria, no hematuria   ENDO:   No polyuria, no polydipsia, no heat intolerance, no cold intolerance   NEURO: No headaches, no dizziness, no convulsions, no tremors   MUSC:  No joint swellings, no arthralgias, no  myalgias, no weakness   SKIN:   No rash, no ulcers, no dry skin   PSYCH:   No depression, no anxiety, no difficulty sleeping       Past Medical History:  Patient Active Problem List    Diagnosis Date Noted   • Essential hypertension 12/02/2020   • Thyroid nodule 12/02/2020   • DESTIN (obstructive sleep apnea) 11/10/2020   • Acute hemorrhagic CVA (Pontine hemorrhage) ([facility]) 11/09/2020   • Situational depression 10/06/2020   • Trochanteric bursitis of left hip 10/06/2020   • Soft tissue mass 05/30/2019   • Obesity (BMI 30-39.9) 05/29/2019   • ED (erectile dysfunction) 06/16/2015   • Vitamin D deficiency 04/27/2015   • RBC microcytosis 04/27/2015   • Microalbuminuria 04/27/2015   • Uncontrolled type 2 diabetes mellitus with hyperglycemia ([facility]) 04/09/2015   • Sleep apnea 04/09/2015   • Mixed hyperlipidemia 04/09/2015   • History of CVA (cerebrovascular accident) without residual deficits 04/09/2015       Past Surgical History:  No past surgical history on file.     Allergies:  Food     Current Medications:    Current Outpatient Medications:   •  TRULICITY 1.5 MG/0.5ML Solution Pen-injector, INJECT 0.5ML SUBCUTANEOUSLY EVERY 7 DAYS., Disp: 12 Each, Rfl: 1  •  insulin detemir (LEVEMIR) 100 UNIT/ML injection PEN, Inject 24 Units under the skin every evening., Disp: 6 Each, Rfl: 3  •  atorvastatin (LIPITOR) 40 MG Tab, TAKE 1 TABLET BY MOUTH ONCE DAILY AT BEDTIME, Disp: 90 tablet, Rfl: 3  •  aspirin (ASA) 81 MG Chew Tab chewable tablet, Chew 81 mg every day., Disp: , Rfl:   •  amLODIPine (NORVASC) 10 MG Tab, Take 1 tablet by mouth every day., Disp: 90 tablet, Rfl: 3  •  metFORMIN ER (GLUCOPHAGE XR) 500 MG TABLET SR 24 HR, Take 2 Tabs by mouth 2 times a day. (Patient taking differently: Take 1,000 mg by mouth 2 times a day. 2 tablets = 1000 mg), Disp: 360 Tab, Rfl: 3  •  lisinopril (PRINIVIL) 40 MG tablet, Take 1 Tab by mouth every day., Disp: 90 Tab, Rfl: 3  •  sertraline (ZOLOFT) 50 MG Tab, Take 1 Tab by mouth every day.  "(Patient taking differently: Take 50 mg by mouth every evening.), Disp: 90 Tab, Rfl: 3  •  Insulin Pen Needle (PEN NEEDLES 5/16\") 30G X 8 MM Misc, 1 Unknown by Does not apply route every day. To be used with insulin pen, Disp: 100 Each, Rfl: 3  •  hydrochlorothiazide (MICROZIDE) 12.5 MG capsule, Take 1 Cap by mouth every day. (Patient not taking: Reported on 12/27/2020), Disp: 30 Cap, Rfl: 0    Social History:  Social History     Socioeconomic History   • Marital status: Single     Spouse name: Not on file   • Number of children: Not on file   • Years of education: Not on file   • Highest education level: Not on file   Occupational History   • Not on file   Tobacco Use   • Smoking status: Never Smoker   • Smokeless tobacco: Never Used   Vaping Use   • Vaping Use: Never used   Substance and Sexual Activity   • Alcohol use: Yes     Alcohol/week: 1.2 oz     Types: 2 Cans of beer per week     Comment: yearly   • Drug use: No   • Sexual activity: Yes   Other Topics Concern   • Not on file   Social History Narrative   • Not on file     Social Determinants of Health     Financial Resource Strain:    • Difficulty of Paying Living Expenses:    Food Insecurity:    • Worried About Running Out of Food in the Last Year:    • Ran Out of Food in the Last Year:    Transportation Needs:    • Lack of Transportation (Medical):    • Lack of Transportation (Non-Medical):    Physical Activity:    • Days of Exercise per Week:    • Minutes of Exercise per Session:    Stress:    • Feeling of Stress :    Social Connections:    • Frequency of Communication with Friends and Family:    • Frequency of Social Gatherings with Friends and Family:    • Attends Congregation Services:    • Active Member of Clubs or Organizations:    • Attends Club or Organization Meetings:    • Marital Status:    Intimate Partner Violence:    • Fear of Current or Ex-Partner:    • Emotionally Abused:    • Physically Abused:    • Sexually Abused:         Family History: " "  Family History   Problem Relation Age of Onset   • Cancer Mother         Breast cancer   • Heart Disease Father         CHF       PHYSICAL EXAM:   Vital signs: /76 (BP Location: Left arm, Patient Position: Sitting, BP Cuff Size: Adult)   Pulse 93   Resp 14   Ht 1.727 m (5' 8\")   Wt 115 kg (254 lb)   SpO2 94%   BMI 38.62 kg/m²   GENERAL: Well-developed, well-nourished  in no apparent distress.   EYE: No ocular and eyelid asymmetry, Anicteric sclerae,  PERRL, No exophthalmos or lidlag  HENT: Hearing grossly intact, Normocephalic, atraumatic. Pink, moist mucous membranes, No exudate  NECK: Supple. Trachea midline.   CARDIOVASCULAR: Regular rate and rhythm. No murmurs, rubs, or gallops.   LUNGS: Clear to auscultation bilaterally   ABDOMEN: Soft, nontender with positive bowel sounds.   EXTREMITIES: No clubbing, cyanosis, or edema.   NEUROLOGICAL: Cranial nerves II-XII are grossly intact   Symmetric reflexes at the patella no proximal muscle weakness, No visible tremor with both outstretched hands  LYMPH: No cervical, supraclavicular,  adenopathy palpated.   SKIN: No rashes, lesions. Turgor is normal.  FOOT: Normal sensation to monofilament testing, normal pulses, no ulcers.  Normal Vibration quantitative sensation test.      ASSESSMENT/PLAN:     1. Uncontrolled type 2 diabetes mellitus with hyperglycemia (HCC)  Unstable.  Continue following diabetes regimen:  Start Toujeo max 24 units daily-patient given coupon and samples to reduce cost  Start Farxiga 5 mg daily-patient given coupon and samples to reduce cost  Continue Metformin 1000 mg twice daily  Increase Trulicity to 3 mg weekly    Detailed discussion with patient regarding all of these medications and how they reduce glucose levels.  Explained to patient that he may slowly have to decrease Toujeo with the addition of Farxiga and the increase in his Trulicity.  Recommend patient continue testing his blood sugars 4-6 times a day.  Would further " recommend a CGM monitoring device at the next appointment once patient is more familiar with the medications and how they are working to lower his glucose.    Recommend balanced meal planning such as [email or web address].  Recommend 2 L to 3 L of water each day.  Dilated eye exam is up-to-date.  Daily foot inspection is always encouraged.  Recommend 150 minutes of exercise each week.    2. Hyperlipidemia associated with type 2 diabetes mellitus (HCC)  Stable.  Continue taking atorvastatin 40 mg daily.    3. Microalbuminuria  Unstable.  SGLT2 inhibitor has been added to regimen.  Hopefully over time this does lower the microalbumin ratio.  Continue lisinopril therapy and will continue to monitor the microalbumin ratio going forward.    4. Thyroid nodule  Stable.  Recommend ultrasound of thyroid to be ordered at next appointment.  Current thyroid panel is recommended and should be drawn before next appointment.    5. Essential hypertension  Unstable.  Continue taking amlodipine and lisinopril and will evaluate this further at the next appointment once glucose levels are better controlled.    6.  Vitamin D deficiency  Unstable.  Recommend current vitamin D level to further assess quantity of vitamin D supplementation.    Complete lab draw prior to next appointment.  Next appointment in 6 weeks with CDE.  Point-of-care A1c in 3 months.  3-month appointment with provider.      Thank you kindly for allowing me to participate in the diabetes care plan for this patient.    Tashia Junior, APRN  07/28/21    CC:   JEEVAN Purvis

## 2021-08-24 ENCOUNTER — TELEPHONE (OUTPATIENT)
Dept: ENDOCRINOLOGY | Facility: MEDICAL CENTER | Age: 56
End: 2021-08-24

## 2021-08-24 DIAGNOSIS — E11.65 UNCONTROLLED TYPE 2 DIABETES MELLITUS WITH HYPERGLYCEMIA (HCC): ICD-10-CM

## 2021-08-24 RX ORDER — DAPAGLIFLOZIN 10 MG/1
1 TABLET, FILM COATED ORAL DAILY
Qty: 30 TABLET | Refills: 6 | Status: SHIPPED | OUTPATIENT
Start: 2021-08-24 | End: 2021-09-15 | Stop reason: SDUPTHER

## 2021-08-24 RX ORDER — INSULIN GLARGINE 300 U/ML
30 INJECTION, SOLUTION SUBCUTANEOUS
Qty: 4.5 ML | Refills: 6 | Status: SHIPPED | OUTPATIENT
Start: 2021-08-24 | End: 2021-09-15 | Stop reason: SDUPTHER

## 2021-08-24 RX ORDER — DULAGLUTIDE 3 MG/.5ML
3 INJECTION, SOLUTION SUBCUTANEOUS
Qty: 2 ML | Refills: 6 | Status: SHIPPED | OUTPATIENT
Start: 2021-08-24 | End: 2021-09-15 | Stop reason: SDUPTHER

## 2021-08-25 NOTE — TELEPHONE ENCOUNTER
VOICEMAIL  1. Caller Name: Joey Dawson                        Call Back Number: [phone]      2. Message: Patient called because he was wondering if he Jeni had sent his prescription for the new medication sent to [facility] in [place]. He believes she was going to begin him on new meds on 07/28/21.He wanted to see if we could sent a script over to pharmacy.    3. Patient approves office to leave a detailed voicemail/MyChart message: yes

## 2021-09-01 DIAGNOSIS — E11.69 TYPE 2 DIABETES MELLITUS WITH OTHER SPECIFIED COMPLICATION, WITH LONG-TERM CURRENT USE OF INSULIN (HCC): ICD-10-CM

## 2021-09-01 DIAGNOSIS — Z79.4 TYPE 2 DIABETES MELLITUS WITH OTHER SPECIFIED COMPLICATION, WITH LONG-TERM CURRENT USE OF INSULIN (HCC): ICD-10-CM

## 2021-09-07 ENCOUNTER — NON-PROVIDER VISIT (OUTPATIENT)
Dept: ENDOCRINOLOGY | Facility: MEDICAL CENTER | Age: 56
End: 2021-09-07
Attending: INTERNAL MEDICINE
Payer: COMMERCIAL

## 2021-09-07 VITALS
DIASTOLIC BLOOD PRESSURE: 78 MMHG | SYSTOLIC BLOOD PRESSURE: 128 MMHG | HEART RATE: 84 BPM | OXYGEN SATURATION: 94 % | WEIGHT: 250 LBS | HEIGHT: 68 IN | BODY MASS INDEX: 37.89 KG/M2

## 2021-09-07 DIAGNOSIS — E11.65 UNCONTROLLED TYPE 2 DIABETES MELLITUS WITH HYPERGLYCEMIA (HCC): ICD-10-CM

## 2021-09-07 LAB
HBA1C MFR BLD: 9.9 % (ref 0–5.6)
INT CON NEG: ABNORMAL
INT CON POS: ABNORMAL

## 2021-09-07 PROCEDURE — 99211 OFF/OP EST MAY X REQ PHY/QHP: CPT | Performed by: NURSE PRACTITIONER

## 2021-09-07 PROCEDURE — 99211 OFF/OP EST MAY X REQ PHY/QHP: CPT | Performed by: INTERNAL MEDICINE

## 2021-09-07 PROCEDURE — 83036 HEMOGLOBIN GLYCOSYLATED A1C: CPT

## 2021-09-07 RX ORDER — ERGOCALCIFEROL 1.25 MG/1
50000 CAPSULE ORAL
Qty: 12 CAPSULE | Refills: 3 | Status: SHIPPED | OUTPATIENT
Start: 2021-09-07 | End: 2021-09-15 | Stop reason: SDUPTHER

## 2021-09-07 ASSESSMENT — FIBROSIS 4 INDEX: FIB4 SCORE: 1.077496047522358132

## 2021-09-07 NOTE — PROGRESS NOTES
RN-CDE Note    Subjective:   Endocrinology Clinic Progress Note  PCP: JEEVAN Purvis    HPI:  Joey Dawson is a 56 y.o. old patient who is seen today for review of Type 2 Diabetes.  Recent changes in health: Health good.  DM:   Last A1c:   Lab Results   Component Value Date/Time    HBA1C 9.9 (A) 09/07/2021 05:12 PM      Previous A1c was 11.4 on 7/28/21  A1C GOAL: < 7    Diabetes Medications:   Toujeo 30 units daily  Farxiga 10 mg daily  Metformin 1000 mg BID Trulicity 3 mg weekly.      Exercise: Bwlling  Diet: Trying to get better.  Encouraged the plate method of eating.  Patient's body mass index is 38.01 kg/m². Exercise and nutrition counseling were performed at this visit.    Glucose monitoring frequency: Testing randomly  130's-200  Hypoglycemic episodes: no  Last Retinal Exam: on file and up-to-date  Daily Foot Exam: Yes   Foot Exam:  Monofilament: done  Monofilament testing with a 10 gram force: sensation intact: intact bilaterally  Visual Inspection: Feet without maceration, ulcers, fissures.  Pedal pulses: intact bilaterally   Lab Results   Component Value Date/Time    MALBCRT 715 (H) 04/24/2021 09:44 AM    MICROALBUR 133.8 04/24/2021 09:44 AM        He  reports that he has never smoked. He has never used smokeless tobacco.      Plan:     Discussed and educated on:   - All medications, side effects and compliance (discussed carefully)  - Annual eye examinations at Ophthalmology  - Home glucose monitoring emphasized  - Weight control and daily exercise    Recommended medication changes: He will increase his Toujeo max as needed to 36 units daily. Started on Olivia 2 CGM and will adjust insulin as needed to keep fasting blood sugar around 100.  He will call with any questions.

## 2021-09-13 ENCOUNTER — OFFICE VISIT (OUTPATIENT)
Dept: NEUROLOGY | Facility: MEDICAL CENTER | Age: 56
End: 2021-09-13
Attending: NURSE PRACTITIONER
Payer: COMMERCIAL

## 2021-09-13 VITALS
RESPIRATION RATE: 14 BRPM | DIASTOLIC BLOOD PRESSURE: 70 MMHG | BODY MASS INDEX: 37.57 KG/M2 | SYSTOLIC BLOOD PRESSURE: 126 MMHG | HEART RATE: 82 BPM | HEIGHT: 68 IN | OXYGEN SATURATION: 95 % | WEIGHT: 247.9 LBS | TEMPERATURE: 98.1 F

## 2021-09-13 DIAGNOSIS — E11.69 TYPE 2 DIABETES MELLITUS WITH OTHER SPECIFIED COMPLICATION, WITH LONG-TERM CURRENT USE OF INSULIN (HCC): ICD-10-CM

## 2021-09-13 DIAGNOSIS — I10 ESSENTIAL HYPERTENSION: ICD-10-CM

## 2021-09-13 DIAGNOSIS — Z79.4 TYPE 2 DIABETES MELLITUS WITH OTHER SPECIFIED COMPLICATION, WITH LONG-TERM CURRENT USE OF INSULIN (HCC): ICD-10-CM

## 2021-09-13 DIAGNOSIS — G47.33 OSA (OBSTRUCTIVE SLEEP APNEA): ICD-10-CM

## 2021-09-13 DIAGNOSIS — I61.3 PONTINE HEMORRHAGE (HCC): ICD-10-CM

## 2021-09-13 DIAGNOSIS — E78.2 MIXED HYPERLIPIDEMIA: ICD-10-CM

## 2021-09-13 PROBLEM — E11.9 DIABETES MELLITUS (HCC): Status: ACTIVE | Noted: 2021-09-13

## 2021-09-13 PROCEDURE — 99212 OFFICE O/P EST SF 10 MIN: CPT | Performed by: NURSE PRACTITIONER

## 2021-09-13 PROCEDURE — 99215 OFFICE O/P EST HI 40 MIN: CPT | Performed by: NURSE PRACTITIONER

## 2021-09-13 ASSESSMENT — ENCOUNTER SYMPTOMS
VOMITING: 0
BACK PAIN: 0
DEPRESSION: 0
NAUSEA: 0
HEARTBURN: 1
BRUISES/BLEEDS EASILY: 0
NECK PAIN: 0
TINGLING: 0
SPEECH CHANGE: 0
WEAKNESS: 0
NERVOUS/ANXIOUS: 0
TREMORS: 0
COUGH: 1
FOCAL WEAKNESS: 0
BLURRED VISION: 0
FEVER: 0
DIZZINESS: 0
DOUBLE VISION: 0
SENSORY CHANGE: 0
HEADACHES: 0

## 2021-09-13 ASSESSMENT — FIBROSIS 4 INDEX: FIB4 SCORE: 1.077496047522358132

## 2021-09-13 NOTE — PROGRESS NOTES
Subjective     HPI  Joey Dawson  is a 56 y.o. right handed male who presents to [facility] for follow up for L pontine hemorrhage.  He is here today to discuss stroke prevention.      I last saw him on 3/8/2021      He presented to [facility] on 11/9/2020  with complaints of headache and right sided weakness.  Blood pressure on admission 189/97.      He had consistently been using his CPAP but it was not as effective as it was in the past; he actually had a repeat sleep study the night prior to his admission.      PMH includes History of stroke, bursitis of left hip, DESTIN, DM,  HLD, obesity and HTN.  Social History:  No history of tobacco use, drinks 2 cans of beer weekly, denies drug use.  He works in sales.         Patient is here alone today. He continues to use CPAP every night, he has recently brought down his A1C and has lost some weight.  He restarted ASA 81mg after CT of head was completed in March 2017.     Review of Systems   Constitutional: Negative for fever.   HENT: Negative for hearing loss.    Eyes: Negative for blurred vision and double vision.   Respiratory: Positive for cough.    Cardiovascular: Negative for chest pain.   Gastrointestinal: Positive for heartburn. Negative for nausea and vomiting.   Musculoskeletal: Negative for back pain and neck pain.   Skin: Negative for rash.   Neurological: Negative for dizziness, tingling, tremors, sensory change, speech change, focal weakness, weakness and headaches.        Poor balance   Endo/Heme/Allergies: Does not bruise/bleed easily.   Psychiatric/Behavioral: Negative for depression. The patient is not nervous/anxious.         Depression has improved with Zoloft       Objective      I personally reviewed imaging below and agree with findings    CT head 3/17/2021    No acute intracranial hemorrhage or territorial infarct.  2.  Interval resolution of previously described pontine hemorrhage.  3.  Multiple chronic  "infarcts involving the LEFT caudate, LEFT occipital lobe, and bilateral cerebellar hemispheres.    Encounter Vitals  Standard Vitals  Vitals  Blood Pressure: 126/70  Temperature: 36.7 °C (98.1 °F)  Temp src: Temporal  Pulse: 82  Respiration: 14  Pulse Oximetry: 95 %  Height: 172.7 cm (5' 8\")  Weight: 112 kg (247 lb 14.4 oz)  Encounter Vitals  Temperature: 36.7 °C (98.1 °F)  Temp src: Temporal  Blood Pressure: 126/70  Pulse: 82  Respiration: 14  Pulse Oximetry: 95 %  Weight: 112 kg (247 lb 14.4 oz)  Height: 172.7 cm (5' 8\")  BMI (Calculated): 37.69    PHYSICAL ASSESSMENT  Constitutional:  Alert, no apparent distress,  Psych:   mood and affect WNL  Muskuloskeletal:  Moves all extremities equally, strength 5/5 bilaterally, no drift  NEUROLOGICAL ASSESSMENT  Oriented X 4, speech fluent, naming and memory intact  CN II: Visual fields are full to confrontation. Fundoscopic exam is normal with sharp discs and no vascular changes. Pupils are 3  mm and briskly reactive to light.   CN III, IV, VI  EOMs intact, no ptosis  CN V: Facial sensation is intact to pinprick in all 3 divisions bilaterally. Corneal responses are intact.  CN VII: Face is symmetric with normal eye closure and smile.  CN VII: Hearing is normal to rubbing fingers  CN IX, X: Palate elevates symmetrically. Phonation is normal.  CN XI: Head turning and shoulder shrug are intact  CN XII: Tongue is midline with normal movements and no atrophy.                           Sensation to PP equal bilaterally                 No limb ataxia with finger to nose and heel to shin                 Ambulates with steady gait.                 Rhomberg negative                 Cardiovascular:    S1S2, no abnormal rhythm auscultated, no peripheral edema  Neck:                     No carotid bruits noted   Pulmonary:            Respirations easy, lungs clear to auscultation all fields.     Skin:                     No obvious rashes.    Iniital NIHSS  3-4      Current " NIHSS    1a. LOC: 0  1b. LOC Questions: 0  1c. LOC Commands: 0  2. Best Gaze:0  3. Visual Fields: 0  4. Facial Paresis:0   5a. Motor arm left: 0  5b. Motor arm right: 0  6a. Motor leg left: 0  6b. Motor leg right: 0  7. Sensory: 0  8. Best Language: 0  9. Limb Ataxia: 0  10. Dysarthria: 0  11. Extinction/Inattention: 0    Total Score Current  0          Current mRS 1 (has some balance issues, likely related to remote cerebellar infarcts, states he has had monofilament testing and does not have diabetic neuropathy)        Assessment & Plan     1. Pontine hemorrhage (HCC)  with MRI evidence of multple remote hemorrhages, likely hypertensive in nature.  ICH Score  Pranav Coma Scale: 0    3-4 (2 points), 5-12 (1 point), 13-15 (0 points)  Age greater than 80:0   No (0 points), yes (1 point)  ICH volume >/= 30 mL: No (0 points), yes (1 point)  Intraventricular hemorrhage: No (0 points), yes (1 point)  Infratentorial origin of hemorrhage  1 No (0 points), yes (1 point)     Total score: 1     1 point: 13% mortality  2 points: 26% mortality  3 points: 72% mortality  4 points: 97% mortality  >/=5 points: 100% mortality     Stroke risk factors include HTN, HLD, DM DESTIN  Avoid NSAIDS, avoid alcohol     Continue ASA 81mg   2. Mixed hyperlipidemia  LDL goal < 70, current 56  continue  atorvastatin 40mg per SPARCL trial for stroke prevention,  discussed medication side effects, will need follow up with primary care evaluate liver function and intervals and refill     Exercise at least 30 minutes daily, avoid red meat, fried foods, butter, cheese.   Eat 5-6 servings of vegetables and fruits daily, choose lean white meat without skin (chicken, turkey, white fish)--baked, broiled or grilled.    3. DESTIN (obstructive sleep apnea)  Continue CPAP     4. Essential hypertension  Blood pressure goal less than 130/80, currently at goal.     5. Type 2 diabetes mellitus with other specified complication, with long-term current use of insulin  (HCC)  A1C goal less than 7.0, currently 9.9, (previous 12.8 in April 2021)     Continue follow up with endocrinology      I spent a total of 47 minutes caring for patient,  my time includes counseling, review of systems, HPI and assessment, review of images, labs and testing as above.  I reviewed the hospital records, PMH, social and family history.   I have counseled patient on stroke prevention strategies, stroke symptoms and mimics.  Diet and exercise modifications.  We discussed medication side effects and instructions.       I have provided patient a written personalized stroke prevention plan, it is filed under the media tab under ‘[facility]”.    Follow up PRN

## 2021-09-15 DIAGNOSIS — E78.2 MIXED HYPERLIPIDEMIA: ICD-10-CM

## 2021-09-15 DIAGNOSIS — Z79.4 TYPE 2 DIABETES MELLITUS WITH OTHER SPECIFIED COMPLICATION, WITH LONG-TERM CURRENT USE OF INSULIN (HCC): ICD-10-CM

## 2021-09-15 DIAGNOSIS — E11.69 TYPE 2 DIABETES MELLITUS WITH OTHER SPECIFIED COMPLICATION, WITH LONG-TERM CURRENT USE OF INSULIN (HCC): ICD-10-CM

## 2021-09-15 DIAGNOSIS — E11.65 UNCONTROLLED TYPE 2 DIABETES MELLITUS WITH HYPERGLYCEMIA (HCC): ICD-10-CM

## 2021-09-15 DIAGNOSIS — I10 ESSENTIAL HYPERTENSION: ICD-10-CM

## 2021-09-15 RX ORDER — INSULIN GLARGINE 300 U/ML
30 INJECTION, SOLUTION SUBCUTANEOUS
Qty: 4.5 ML | Refills: 6 | Status: SHIPPED | OUTPATIENT
Start: 2021-09-15 | End: 2021-10-11 | Stop reason: SDUPTHER

## 2021-09-15 RX ORDER — ERGOCALCIFEROL 1.25 MG/1
50000 CAPSULE ORAL
Qty: 12 CAPSULE | Refills: 3 | Status: SHIPPED | OUTPATIENT
Start: 2021-09-15 | End: 2022-10-11 | Stop reason: SDUPTHER

## 2021-09-15 RX ORDER — AMLODIPINE BESYLATE 10 MG/1
10 TABLET ORAL DAILY
Qty: 90 TABLET | Refills: 3 | Status: SHIPPED | OUTPATIENT
Start: 2021-09-15 | End: 2022-06-29 | Stop reason: SDUPTHER

## 2021-09-15 RX ORDER — LISINOPRIL 40 MG/1
TABLET ORAL
Qty: 90 TABLET | Refills: 3 | Status: SHIPPED | OUTPATIENT
Start: 2021-09-15 | End: 2022-06-29 | Stop reason: SDUPTHER

## 2021-09-15 RX ORDER — METFORMIN HYDROCHLORIDE 500 MG/1
1000 TABLET, EXTENDED RELEASE ORAL 2 TIMES DAILY
Qty: 360 TABLET | Refills: 3 | Status: SHIPPED | OUTPATIENT
Start: 2021-09-15 | End: 2022-06-29 | Stop reason: SDUPTHER

## 2021-09-15 RX ORDER — DULAGLUTIDE 3 MG/.5ML
3 INJECTION, SOLUTION SUBCUTANEOUS
Qty: 2 ML | Refills: 6 | Status: SHIPPED | OUTPATIENT
Start: 2021-09-15 | End: 2022-06-29

## 2021-09-15 RX ORDER — ATORVASTATIN CALCIUM 40 MG/1
TABLET, FILM COATED ORAL
Qty: 90 TABLET | Refills: 3 | Status: SHIPPED | OUTPATIENT
Start: 2021-09-15 | End: 2022-06-29 | Stop reason: SDUPTHER

## 2021-09-15 RX ORDER — DAPAGLIFLOZIN 10 MG/1
1 TABLET, FILM COATED ORAL DAILY
Qty: 30 TABLET | Refills: 6 | Status: SHIPPED | OUTPATIENT
Start: 2021-09-15 | End: 2022-06-29 | Stop reason: SDUPTHER

## 2021-09-20 PROCEDURE — RXMED WILLOW AMBULATORY MEDICATION CHARGE: Performed by: INTERNAL MEDICINE

## 2021-09-21 ENCOUNTER — PHARMACY VISIT (OUTPATIENT)
Dept: PHARMACY | Facility: MEDICAL CENTER | Age: 56
End: 2021-09-21
Payer: COMMERCIAL

## 2021-10-06 DIAGNOSIS — F43.21 SITUATIONAL DEPRESSION: ICD-10-CM

## 2021-10-08 NOTE — TELEPHONE ENCOUNTER
Received request via: Patient    Was the patient seen in the last year in this department? Yes  4/29/21  Does the patient have an active prescription (recently filled or refills available) for medication(s) requested? No

## 2021-10-11 DIAGNOSIS — E11.65 UNCONTROLLED TYPE 2 DIABETES MELLITUS WITH HYPERGLYCEMIA (HCC): ICD-10-CM

## 2021-10-11 DIAGNOSIS — F43.21 SITUATIONAL DEPRESSION: ICD-10-CM

## 2021-10-11 PROCEDURE — RXMED WILLOW AMBULATORY MEDICATION CHARGE: Performed by: INTERNAL MEDICINE

## 2021-10-11 RX ORDER — INSULIN GLARGINE 300 U/ML
45 INJECTION, SOLUTION SUBCUTANEOUS
Qty: 13.5 ML | Refills: 3 | Status: SHIPPED | OUTPATIENT
Start: 2021-10-11 | End: 2022-06-29

## 2021-10-12 PROCEDURE — RXMED WILLOW AMBULATORY MEDICATION CHARGE: Performed by: NURSE PRACTITIONER

## 2021-10-14 ENCOUNTER — PHARMACY VISIT (OUTPATIENT)
Dept: PHARMACY | Facility: MEDICAL CENTER | Age: 56
End: 2021-10-14
Payer: COMMERCIAL

## 2021-10-21 ENCOUNTER — PHARMACY VISIT (OUTPATIENT)
Dept: PHARMACY | Facility: MEDICAL CENTER | Age: 56
End: 2021-10-21
Payer: COMMERCIAL

## 2021-10-21 PROCEDURE — RXMED WILLOW AMBULATORY MEDICATION CHARGE: Performed by: INTERNAL MEDICINE

## 2021-10-27 ENCOUNTER — PHARMACY VISIT (OUTPATIENT)
Dept: PHARMACY | Facility: MEDICAL CENTER | Age: 56
End: 2021-10-27
Payer: COMMERCIAL

## 2021-10-27 PROCEDURE — RXMED WILLOW AMBULATORY MEDICATION CHARGE: Performed by: INTERNAL MEDICINE

## 2021-11-03 ENCOUNTER — OFFICE VISIT (OUTPATIENT)
Dept: ENDOCRINOLOGY | Facility: MEDICAL CENTER | Age: 56
End: 2021-11-03
Attending: NURSE PRACTITIONER
Payer: COMMERCIAL

## 2021-11-03 VITALS
SYSTOLIC BLOOD PRESSURE: 126 MMHG | BODY MASS INDEX: 37.89 KG/M2 | DIASTOLIC BLOOD PRESSURE: 72 MMHG | WEIGHT: 250 LBS | HEIGHT: 68 IN | OXYGEN SATURATION: 93 % | HEART RATE: 82 BPM

## 2021-11-03 DIAGNOSIS — R80.9 MICROALBUMINURIA: ICD-10-CM

## 2021-11-03 DIAGNOSIS — I10 ESSENTIAL HYPERTENSION: ICD-10-CM

## 2021-11-03 DIAGNOSIS — E55.9 VITAMIN D DEFICIENCY: ICD-10-CM

## 2021-11-03 DIAGNOSIS — E78.5 HYPERLIPIDEMIA ASSOCIATED WITH TYPE 2 DIABETES MELLITUS (HCC): ICD-10-CM

## 2021-11-03 DIAGNOSIS — E11.69 HYPERLIPIDEMIA ASSOCIATED WITH TYPE 2 DIABETES MELLITUS (HCC): ICD-10-CM

## 2021-11-03 DIAGNOSIS — E04.1 THYROID NODULE: ICD-10-CM

## 2021-11-03 DIAGNOSIS — E11.65 UNCONTROLLED TYPE 2 DIABETES MELLITUS WITH HYPERGLYCEMIA (HCC): ICD-10-CM

## 2021-11-03 PROCEDURE — 99214 OFFICE O/P EST MOD 30 MIN: CPT | Performed by: NURSE PRACTITIONER

## 2021-11-03 PROCEDURE — 99212 OFFICE O/P EST SF 10 MIN: CPT | Performed by: NURSE PRACTITIONER

## 2021-11-03 ASSESSMENT — FIBROSIS 4 INDEX: FIB4 SCORE: 1.077496047522358132

## 2021-11-03 NOTE — PROGRESS NOTES
Chief Complaint:    Follow-up evaluation of Type 2 Diabetes Mellitus.    HPI:   Joey Dawson is a 56 y.o. male for continued evaluation & treatment of the followin. Type 2 Diabetes Mellitus   Diagnosed in .   Patient has been doing quite well since his initial consultation and then follow-up with our CDE in the last 4 months.  Average glucose levels continue to improve.    Current diabetes regimen:  Toujeo 42 units daily  Farxiga 10 mg daily  Metformin 1000 mg twice daily  Trulicity to 3 mg weekly    Blood glucose logs12021: Patient has been utilizing a freestyle deanna to for several months now.  Average glucose is 151. Time in target range 84%.    Denies hypoglycemic episodes occurring in the past.  Patient denies hypoglycemic unawareness.        Point-of-care A1c 2021: 9.9%-it is too early for us to recheck his A1c at this time.  Point-of-care A1c 2021: 11.4%     Ref. Range 2021 11:22   C-Peptide Latest Ref Range: 0.8 - 3.5 ng/mL 2.0      Ref. Range 2021 11:22   CHAVEZ Antibody Latest Ref Range: 0.0 - 5.0 IU/mL <5.0       Diabetes Complications   Patient reports history of retinopathy.  Most recent exam shows diabetic retinopathy, nonproliferative bilateral retinas.  Reports laser eye surgery. Last eye exam: 12/15/2020.  Patient denies history of peripheral sensory neuropathy.  Patient denies numbness and/or burning in his feet. Patient denies history of foot sores.   Patient denies history of kidney damage.      Patient is currently taking lisinopril 40 mg daily.  Blood pressure currently 126/72.     Ref. Range 2021 09:44   Micro Alb Creat Ratio Latest Ref Range: 0 - 30 mg/g 715 (H)   Creatinine, Urine Latest Units: mg/dL 187.17   Microalbumin, Urine Random Latest Units: mg/dL 133.8     .     2. Hyperlipidemia  Currently taking atorvastatin 40 mg daily. Patient has tolerated statin therapy quite well for several years.  Patient denies myalgias or muscle cramps.  Patient  reports history of stroke 2013 & 2020 and TIA     Ref. Range 2/6/2021 08:56   Cholesterol,Tot Latest Ref Range: 100 - 199 mg/dL 104   Triglycerides Latest Ref Range: 0 - 149 mg/dL 76   HDL Latest Ref Range: >=40 mg/dL 33 (A)   LDL Latest Ref Range: <100 mg/dL 56       Patient had CTA of neck completed during this hospitalization in November 2021.  Incidental finding of multiple thyroid nodules with the largest measuring 2 cm on the left lobe.  Patient denies dysphagia, anterior neck pain and shortness of breath.  Patient has no past history of thyroid disease.  No current thyroid panel for review at this time.    3. VItamin D deficiency  Currently taking vitamin D 50,000 IU weekly.     Ref. Range 4/24/2015 14:17   25-Hydroxy   Vitamin D 25 Latest Ref Range: 30 - 100 ng/mL 15 (L)       ROS:     CONS:     No fever, no chills, no weight loss, no fatigue   EYES:      No diplopia, no blurry vision, no redness of eyes, no swelling of eyelids   ENT:    No hearing loss, No ear pain, No sore throat, no dysphagia, no neck swelling   CV:     No chest pain, no palpitations, no claudication, no orthopnea, no PND   PULM:    No SOB, no cough, no hemoptysis, no wheezing    GI:   No nausea, no vomiting, no diarrhea, no constipation, no bloody stools   :  Passing urine well, no dysuria, no hematuria   ENDO:   No polyuria, no polydipsia, no heat intolerance, no cold intolerance   NEURO: No headaches, no dizziness, no convulsions, no tremors   MUSC:  No joint swellings, no arthralgias, no myalgias, no weakness   SKIN:   No rash, no ulcers, no dry skin   PSYCH:   No depression, no anxiety, no difficulty sleeping       Past Medical History:  Patient Active Problem List    Diagnosis Date Noted   • Diabetes mellitus (HCC) 09/13/2021   • Essential hypertension 12/02/2020   • Thyroid nodule 12/02/2020   • DESTIN (obstructive sleep apnea) 11/10/2020   • Acute hemorrhagic CVA (Pontine hemorrhage) ([facility]) 11/09/2020   • Situational depression  10/06/2020   • Trochanteric bursitis of left hip 10/06/2020   • Soft tissue mass 05/30/2019   • Obesity (BMI 30-39.9) 05/29/2019   • ED (erectile dysfunction) 06/16/2015   • Vitamin D deficiency 04/27/2015   • RBC microcytosis 04/27/2015   • Microalbuminuria 04/27/2015   • Uncontrolled type 2 diabetes mellitus with hyperglycemia (HCC) 04/09/2015   • Sleep apnea 04/09/2015   • Mixed hyperlipidemia 04/09/2015   • History of CVA (cerebrovascular accident) without residual deficits 04/09/2015       Past Surgical History:  No past surgical history on file.     Allergies:  Food     Current Medications:    Current Outpatient Medications:   •  sertraline (ZOLOFT) 50 MG Tab, Take 1 Tablet by mouth every day., Disp: 90 Tablet, Rfl: 1  •  Insulin Glargine, 1 Unit Dial, (TOUJEO SOLOSTAR) 300 UNIT/ML Solution Pen-injector, Inject 45 Units under the skin at bedtime. 6 pens per month, Disp: 13.5 mL, Rfl: 3  •  vitamin D2, Ergocalciferol, (DRISDOL) 1.25 MG (33459 UT) Cap capsule, Take 1 Capsule by mouth every 7 days., Disp: 12 Capsule, Rfl: 3  •  Continuous Blood Gluc Sensor (FREESTYLE JONES 2 SENSOR) [facility], Use to test continuously, replacing every 14 days., Disp: 2 Each, Rfl: 6  •  Dapagliflozin Propanediol (FARXIGA) 10 MG Tab, Take 1 tablet  by mouth every day., Disp: 30 Tablet, Rfl: 6  •  Dulaglutide (TRULICITY) 3 MG/0.5ML Solution Pen-injector, Inject 1 pen under the skin every 7 days., Disp: 2 mL, Rfl: 6  •  lisinopril (PRINIVIL) 40 MG tablet, Take 1 tablet by mouth once daily, Disp: 90 Tablet, Rfl: 3  •  atorvastatin (LIPITOR) 40 MG Tab, TAKE 1 TABLET BY MOUTH ONCE DAILY AT BEDTIME, Disp: 90 Tablet, Rfl: 3  •  metFORMIN ER (GLUCOPHAGE XR) 500 MG TABLET SR 24 HR, Take 2 Tablets by mouth 2 times a day., Disp: 360 Tablet, Rfl: 3  •  amLODIPine (NORVASC) 10 MG Tab, Take 1 Tablet by mouth every day., Disp: 90 Tablet, Rfl: 3  •  Insulin Pen Needle, use for injections daily, Disp: 100 Each, Rfl: 3  •  aspirin (ASA) 81 MG Chew Tab  "chewable tablet, Chew 81 mg every day., Disp: , Rfl:     Social History:  Social History     Socioeconomic History   • Marital status: Single     Spouse name: Not on file   • Number of children: Not on file   • Years of education: Not on file   • Highest education level: Not on file   Occupational History   • Not on file   Tobacco Use   • Smoking status: Never Smoker   • Smokeless tobacco: Never Used   Vaping Use   • Vaping Use: Never used   Substance and Sexual Activity   • Alcohol use: Yes     Alcohol/week: 1.2 oz     Types: 2 Cans of beer per week     Comment: yearly   • Drug use: No   • Sexual activity: Yes   Other Topics Concern   • Not on file   Social History Narrative   • Not on file     Social Determinants of Health     Financial Resource Strain:    • Difficulty of Paying Living Expenses:    Food Insecurity:    • Worried About Running Out of Food in the Last Year:    • Ran Out of Food in the Last Year:    Transportation Needs:    • Lack of Transportation (Medical):    • Lack of Transportation (Non-Medical):    Physical Activity:    • Days of Exercise per Week:    • Minutes of Exercise per Session:    Stress:    • Feeling of Stress :    Social Connections:    • Frequency of Communication with Friends and Family:    • Frequency of Social Gatherings with Friends and Family:    • Attends Yarsanism Services:    • Active Member of Clubs or Organizations:    • Attends Club or Organization Meetings:    • Marital Status:    Intimate Partner Violence:    • Fear of Current or Ex-Partner:    • Emotionally Abused:    • Physically Abused:    • Sexually Abused:         Family History:   Family History   Problem Relation Age of Onset   • Cancer Mother         Breast cancer   • Heart Disease Father         CHF       PHYSICAL EXAM:   Vital signs: /72 (BP Location: Left arm, Patient Position: Sitting, BP Cuff Size: Large adult)   Pulse 82   Ht 1.727 m (5' 8\")   Wt 113 kg (250 lb)   SpO2 93%   BMI 38.01 kg/m² "   GENERAL: Well-developed, well-nourished  in no apparent distress.   EYE: No ocular and eyelid asymmetry, Anicteric sclerae,  PERRL, No exophthalmos or lidlag  HENT: Hearing grossly intact, Normocephalic, atraumatic. Pink, moist mucous membranes, No exudate  NECK: Supple. Trachea midline.   CARDIOVASCULAR: Regular rate and rhythm. No murmurs, rubs, or gallops.   LUNGS: Clear to auscultation bilaterally   ABDOMEN: Soft, nontender with positive bowel sounds.   EXTREMITIES: No clubbing, cyanosis, or edema.   NEUROLOGICAL: Cranial nerves II-XII are grossly intact   Symmetric reflexes at the patella no proximal muscle weakness, No visible tremor with both outstretched hands  LYMPH: No cervical, supraclavicular,  adenopathy palpated.   SKIN: No rashes, lesions. Turgor is normal.  FOOT: Normal sensation to monofilament testing, normal pulses, no ulcers.  Normal Vibration quantitative sensation test.      ASSESSMENT/PLAN:     1. Uncontrolled type 2 diabetes mellitus with hyperglycemia (HCC)  Unstable, much improved.   Continue following diabetes regimen:  Toujeo 42 units daily  Farxiga 10 mg daily  Metformin 1000 mg twice daily  Trulicity to 3 mg weekly    Patient is doing quite well with CGM monitoring as this keeps him well informed to constant glucose variations and able to make better choices when consuming food throughout the day.    Recommend balanced meal planning such as my [email or web address].  Recommend 2 L to 3 L of water each day.  Dilated eye exam is up-to-date.  Daily foot inspection is always encouraged.  Recommend 150 minutes of exercise each week.    2. Hyperlipidemia associated with type 2 diabetes mellitus (HCC)  Stable.  Continue taking atorvastatin 40 mg daily.    3. Microalbuminuria  Unstable.  SGLT2 inhibitor has been added to regimen.  Hopefully over time this does lower the microalbumin ratio.  Continue lisinopril therapy and will continue to monitor the microalbumin ratio going forward.    4. Thyroid  nodule  Stable.  Recommend ultrasound of thyroid to be ordered at next appointment.  Current thyroid panel is recommended and should be drawn before next appointment.    5. Essential hypertension  Stable.  Continue taking amlodipine and lisinopril and will evaluate this further at the next appointment once glucose levels are better controlled.    6.  Vitamin D deficiency  Unstable.  Continue taking vitamin D 50,000 IU daily.    Point-of-care A1c at next appointment.  Next appointment in 3 months with CDE.  6-month appointment with provider.      Thank you kindly for allowing me to participate in the diabetes care plan for this patient.    Tashia Junior, APRN  11/03/2021    CC:   JEEVAN Purvis

## 2021-11-08 PROCEDURE — RXMED WILLOW AMBULATORY MEDICATION CHARGE: Performed by: INTERNAL MEDICINE

## 2021-11-15 PROCEDURE — RXMED WILLOW AMBULATORY MEDICATION CHARGE: Performed by: INTERNAL MEDICINE

## 2021-11-16 ENCOUNTER — PHARMACY VISIT (OUTPATIENT)
Dept: PHARMACY | Facility: MEDICAL CENTER | Age: 56
End: 2021-11-16
Payer: COMMERCIAL

## 2021-11-22 PROCEDURE — RXMED WILLOW AMBULATORY MEDICATION CHARGE: Performed by: INTERNAL MEDICINE

## 2021-11-23 ENCOUNTER — PHARMACY VISIT (OUTPATIENT)
Dept: PHARMACY | Facility: MEDICAL CENTER | Age: 56
End: 2021-11-23
Payer: COMMERCIAL

## 2021-12-06 PROCEDURE — RXMED WILLOW AMBULATORY MEDICATION CHARGE: Performed by: INTERNAL MEDICINE

## 2021-12-08 PROCEDURE — RXMED WILLOW AMBULATORY MEDICATION CHARGE: Performed by: INTERNAL MEDICINE

## 2021-12-10 ENCOUNTER — PHARMACY VISIT (OUTPATIENT)
Dept: PHARMACY | Facility: MEDICAL CENTER | Age: 56
End: 2021-12-10
Payer: COMMERCIAL

## 2021-12-15 ENCOUNTER — PHARMACY VISIT (OUTPATIENT)
Dept: PHARMACY | Facility: MEDICAL CENTER | Age: 56
End: 2021-12-15
Payer: COMMERCIAL

## 2021-12-15 PROCEDURE — RXMED WILLOW AMBULATORY MEDICATION CHARGE: Performed by: INTERNAL MEDICINE

## 2021-12-21 ENCOUNTER — PHARMACY VISIT (OUTPATIENT)
Dept: PHARMACY | Facility: MEDICAL CENTER | Age: 56
End: 2021-12-21
Payer: COMMERCIAL

## 2021-12-21 PROCEDURE — RXMED WILLOW AMBULATORY MEDICATION CHARGE: Performed by: INTERNAL MEDICINE

## 2022-01-05 PROCEDURE — RXMED WILLOW AMBULATORY MEDICATION CHARGE: Performed by: INTERNAL MEDICINE

## 2022-01-06 ENCOUNTER — PHARMACY VISIT (OUTPATIENT)
Dept: PHARMACY | Facility: MEDICAL CENTER | Age: 57
End: 2022-01-06
Payer: COMMERCIAL

## 2022-01-10 PROCEDURE — RXMED WILLOW AMBULATORY MEDICATION CHARGE: Performed by: NURSE PRACTITIONER

## 2022-01-11 ENCOUNTER — PHARMACY VISIT (OUTPATIENT)
Dept: PHARMACY | Facility: MEDICAL CENTER | Age: 57
End: 2022-01-11
Payer: COMMERCIAL

## 2022-01-11 PROCEDURE — RXMED WILLOW AMBULATORY MEDICATION CHARGE: Performed by: INTERNAL MEDICINE

## 2022-01-13 ENCOUNTER — HOSPITAL ENCOUNTER (OUTPATIENT)
Facility: MEDICAL CENTER | Age: 57
End: 2022-01-13
Attending: PHYSICIAN ASSISTANT
Payer: COMMERCIAL

## 2022-01-13 ENCOUNTER — OFFICE VISIT (OUTPATIENT)
Dept: URGENT CARE | Facility: PHYSICIAN GROUP | Age: 57
End: 2022-01-13
Payer: COMMERCIAL

## 2022-01-13 VITALS
OXYGEN SATURATION: 94 % | RESPIRATION RATE: 12 BRPM | HEART RATE: 80 BPM | HEIGHT: 66 IN | BODY MASS INDEX: 40.18 KG/M2 | WEIGHT: 250 LBS | DIASTOLIC BLOOD PRESSURE: 84 MMHG | SYSTOLIC BLOOD PRESSURE: 138 MMHG | TEMPERATURE: 98.3 F

## 2022-01-13 DIAGNOSIS — B34.9 VIRAL ILLNESS: ICD-10-CM

## 2022-01-13 PROCEDURE — 99213 OFFICE O/P EST LOW 20 MIN: CPT | Performed by: PHYSICIAN ASSISTANT

## 2022-01-13 PROCEDURE — U0005 INFEC AGEN DETEC AMPLI PROBE: HCPCS

## 2022-01-13 PROCEDURE — U0003 INFECTIOUS AGENT DETECTION BY NUCLEIC ACID (DNA OR RNA); SEVERE ACUTE RESPIRATORY SYNDROME CORONAVIRUS 2 (SARS-COV-2) (CORONAVIRUS DISEASE [COVID-19]), AMPLIFIED PROBE TECHNIQUE, MAKING USE OF HIGH THROUGHPUT TECHNOLOGIES AS DESCRIBED BY CMS-2020-01-R: HCPCS

## 2022-01-13 ASSESSMENT — FIBROSIS 4 INDEX: FIB4 SCORE: 1.077496047522358132

## 2022-01-14 DIAGNOSIS — B34.9 VIRAL ILLNESS: ICD-10-CM

## 2022-01-14 NOTE — PROGRESS NOTES
"Subjective:   Joey Dawson is a 56 y.o. male who presents for Fever (body aches, x1 day), Congestion, and Headache      HPI  Patient is a 56-year-old male here in the clinic for COVID-19 testing.  Onset of symptoms last night with a cough, mild throat congestion, slight headache.  Associated fatigue, muscle aches, mild chills. Denies any fever, chest pain, SOB, abdominal pain, vomiting, diarrhea. Received COVID vaccine. History of DM. No known sick contacts.             Medications:    • amLODIPine Tabs  • aspirin Chew  • atorvastatin Tabs  • Farxiga Tabs  • FreeStyle Olivia 2 Sensor Misc  • Insulin Pen Needle  • lisinopril  • metFORMIN ER Tb24  • sertraline Tabs  • Toujeo SoloStar Sopn  • Trulicity Sopn  • vitamin D2 (Ergocalciferol) Caps    Allergies: Food    Problem List: Joey Dawson does not have any pertinent problems on file.    Surgical History:  No past surgical history on file.    Past Social Hx: Joey Dawson  reports that he has never smoked. He has never used smokeless tobacco. He reports current alcohol use of about 1.2 oz of alcohol per week. He reports that he does not use drugs.     Past Family Hx:  Joey Dawson family history includes Cancer in his mother; Heart Disease in his father.     Problem list, medications, and allergies reviewed by myself today in Epic.     Objective:     /84   Pulse 80   Temp 36.8 °C (98.3 °F)   Resp 12   Ht 1.676 m (5' 6\")   Wt 113 kg (250 lb) Comment: per pt  SpO2 94%   BMI 40.35 kg/m²     Physical Exam  Vitals reviewed.   Constitutional:       General: He is not in acute distress.     Appearance: Normal appearance. He is not ill-appearing or toxic-appearing.   HENT:      Head: Normocephalic.      Mouth/Throat:      Mouth: Mucous membranes are moist.      Pharynx: Oropharynx is clear.   Eyes:      Conjunctiva/sclera: Conjunctivae normal.      Pupils: Pupils are equal, round, and reactive to light.   Cardiovascular:      Rate and Rhythm: Normal " rate and regular rhythm.      Heart sounds: Normal heart sounds.   Pulmonary:      Effort: Pulmonary effort is normal. No respiratory distress.      Breath sounds: Normal breath sounds. No wheezing, rhonchi or rales.   Musculoskeletal:      Cervical back: Neck supple.   Lymphadenopathy:      Cervical: No cervical adenopathy.   Skin:     General: Skin is warm and dry.   Neurological:      General: No focal deficit present.      Mental Status: He is alert and oriented to person, place, and time.   Psychiatric:         Mood and Affect: Mood normal.         Behavior: Behavior normal.         Diagnosis and associated orders:     1. Viral illness  SARS-CoV-2 PCR (24 hour In-House): Collect NP swab in [facility]        Comments/MDM:     The patient's presenting symptoms and exam findings most likely are due to a viral etiology.   Test for COVID-19 via PCR. Result will be reviewed by myself. We will call/message back for results and appropriate further instructions. Instructed to sign up for [employer] if they have not already. Result will be automatically released to [employer] ([employer]) application for patient review. I will be sending a message with Next Step Instructions to [employer] ([employer]) soon after resulted.   Symptomatic and supportive care:   Plenty of oral hydration and rest   Over the counter cough suppressant as directed.  Tylenol or ibuprofen for pain and fever as directed.   Warm salt water gargles for sore throat, soft foods, cool liquids.   Saline nasal spray and Flonase as a decongestant.   Infection control measures at home. Stay away from people, Hand washing, covering sneeze/cough, disinfect surfaces.   Remain home from work, school, and other populated environments. Work note provided with information of quarantine measures per CDC guidelines.   Overall, the patient is very well-appearing. They are not hypoxic, afebrile, and a normal pulmonary exam.       I personally reviewed prior external notes and test results pertinent to today's  visit. Red flags discussed and indications to present to the Emergency Department. Supportive care, natural history, differential diagnoses, and indications for immediate follow-up discussed. Patient expresses understanding and agrees to plan. Patient denies any other questions or concerns.     Follow-up with the primary care physician for recheck, reevaluation, and consideration of further management.    Please note that this dictation was created using voice recognition software. I have made a reasonable attempt to correct obvious errors, but I expect that there are errors of grammar and possibly content that I did not discover before finalizing the note.    This note was electronically signed by Wilman Cruz PA-C

## 2022-01-15 LAB
SARS-COV-2 RNA RESP QL NAA+PROBE: DETECTED
SPECIMEN SOURCE: ABNORMAL

## 2022-01-19 ENCOUNTER — PHARMACY VISIT (OUTPATIENT)
Dept: PHARMACY | Facility: MEDICAL CENTER | Age: 57
End: 2022-01-19
Payer: COMMERCIAL

## 2022-01-19 PROCEDURE — RXMED WILLOW AMBULATORY MEDICATION CHARGE: Performed by: INTERNAL MEDICINE

## 2022-01-31 PROCEDURE — RXMED WILLOW AMBULATORY MEDICATION CHARGE: Performed by: INTERNAL MEDICINE

## 2022-02-04 ENCOUNTER — PHARMACY VISIT (OUTPATIENT)
Dept: PHARMACY | Facility: MEDICAL CENTER | Age: 57
End: 2022-02-04
Payer: COMMERCIAL

## 2022-02-08 ENCOUNTER — APPOINTMENT (OUTPATIENT)
Dept: ENDOCRINOLOGY | Facility: MEDICAL CENTER | Age: 57
End: 2022-02-08
Attending: NURSE PRACTITIONER
Payer: COMMERCIAL

## 2022-02-16 DIAGNOSIS — F43.21 SITUATIONAL DEPRESSION: ICD-10-CM

## 2022-02-16 PROCEDURE — RXMED WILLOW AMBULATORY MEDICATION CHARGE: Performed by: INTERNAL MEDICINE

## 2022-02-17 NOTE — TELEPHONE ENCOUNTER
Received request via: Pharmacy    Was the patient seen in the last year in this department? Yes    Does the patient have an active prescription (recently filled or refills available) for medication(s) requested? No     LAST OV: 4/29/2021

## 2022-02-18 ENCOUNTER — PHARMACY VISIT (OUTPATIENT)
Dept: PHARMACY | Facility: MEDICAL CENTER | Age: 57
End: 2022-02-18
Payer: COMMERCIAL

## 2022-03-28 PROCEDURE — RXMED WILLOW AMBULATORY MEDICATION CHARGE: Performed by: INTERNAL MEDICINE

## 2022-03-29 ENCOUNTER — PHARMACY VISIT (OUTPATIENT)
Dept: PHARMACY | Facility: MEDICAL CENTER | Age: 57
End: 2022-03-29
Payer: COMMERCIAL

## 2022-06-01 PROCEDURE — RXMED WILLOW AMBULATORY MEDICATION CHARGE: Performed by: FAMILY MEDICINE

## 2022-06-02 ENCOUNTER — PHARMACY VISIT (OUTPATIENT)
Dept: PHARMACY | Facility: MEDICAL CENTER | Age: 57
End: 2022-06-02
Payer: COMMERCIAL

## 2022-06-29 ENCOUNTER — OFFICE VISIT (OUTPATIENT)
Dept: ENDOCRINOLOGY | Facility: MEDICAL CENTER | Age: 57
End: 2022-06-29
Attending: INTERNAL MEDICINE

## 2022-06-29 VITALS
WEIGHT: 257 LBS | BODY MASS INDEX: 40.34 KG/M2 | SYSTOLIC BLOOD PRESSURE: 126 MMHG | OXYGEN SATURATION: 96 % | HEIGHT: 67 IN | DIASTOLIC BLOOD PRESSURE: 82 MMHG | HEART RATE: 72 BPM

## 2022-06-29 DIAGNOSIS — E78.5 HYPERLIPIDEMIA ASSOCIATED WITH TYPE 2 DIABETES MELLITUS (HCC): ICD-10-CM

## 2022-06-29 DIAGNOSIS — E04.1 THYROID NODULE: ICD-10-CM

## 2022-06-29 DIAGNOSIS — E11.69 HYPERLIPIDEMIA ASSOCIATED WITH TYPE 2 DIABETES MELLITUS (HCC): ICD-10-CM

## 2022-06-29 DIAGNOSIS — R80.9 MICROALBUMINURIA: ICD-10-CM

## 2022-06-29 DIAGNOSIS — E11.65 UNCONTROLLED TYPE 2 DIABETES MELLITUS WITH HYPERGLYCEMIA (HCC): ICD-10-CM

## 2022-06-29 DIAGNOSIS — E78.2 MIXED HYPERLIPIDEMIA: ICD-10-CM

## 2022-06-29 DIAGNOSIS — Z79.4 TYPE 2 DIABETES MELLITUS WITH OTHER SPECIFIED COMPLICATION, WITH LONG-TERM CURRENT USE OF INSULIN (HCC): ICD-10-CM

## 2022-06-29 DIAGNOSIS — E11.69 TYPE 2 DIABETES MELLITUS WITH OTHER SPECIFIED COMPLICATION, WITH LONG-TERM CURRENT USE OF INSULIN (HCC): ICD-10-CM

## 2022-06-29 DIAGNOSIS — I10 ESSENTIAL HYPERTENSION: ICD-10-CM

## 2022-06-29 LAB
HBA1C MFR BLD: 9.3 % (ref 0–5.6)
INT CON NEG: NEGATIVE
INT CON POS: POSITIVE

## 2022-06-29 PROCEDURE — 99212 OFFICE O/P EST SF 10 MIN: CPT | Performed by: INTERNAL MEDICINE

## 2022-06-29 PROCEDURE — 83036 HEMOGLOBIN GLYCOSYLATED A1C: CPT | Performed by: INTERNAL MEDICINE

## 2022-06-29 PROCEDURE — 99214 OFFICE O/P EST MOD 30 MIN: CPT | Performed by: INTERNAL MEDICINE

## 2022-06-29 PROCEDURE — RXMED WILLOW AMBULATORY MEDICATION CHARGE: Performed by: INTERNAL MEDICINE

## 2022-06-29 RX ORDER — DULAGLUTIDE 4.5 MG/.5ML
4.5 INJECTION, SOLUTION SUBCUTANEOUS
Qty: 2 ML | Refills: 6 | Status: SHIPPED | OUTPATIENT
Start: 2022-06-29 | End: 2022-06-29 | Stop reason: SDUPTHER

## 2022-06-29 RX ORDER — LISINOPRIL 40 MG/1
TABLET ORAL
Qty: 90 TABLET | Refills: 3 | Status: SHIPPED | OUTPATIENT
Start: 2022-06-29 | End: 2022-06-29 | Stop reason: SDUPTHER

## 2022-06-29 RX ORDER — INSULIN GLARGINE 100 [IU]/ML
60 INJECTION, SOLUTION SUBCUTANEOUS EVERY EVENING
Qty: 30 ML | Refills: 6 | Status: SHIPPED | OUTPATIENT
Start: 2022-06-29 | End: 2022-06-29 | Stop reason: SDUPTHER

## 2022-06-29 RX ORDER — METFORMIN HYDROCHLORIDE 500 MG/1
1000 TABLET, EXTENDED RELEASE ORAL 2 TIMES DAILY
Qty: 360 TABLET | Refills: 3 | Status: SHIPPED | OUTPATIENT
Start: 2022-06-29 | End: 2022-10-11

## 2022-06-29 RX ORDER — LISINOPRIL 40 MG/1
TABLET ORAL
Qty: 90 TABLET | Refills: 3 | Status: SHIPPED | OUTPATIENT
Start: 2022-06-29 | End: 2022-10-11 | Stop reason: SDUPTHER

## 2022-06-29 RX ORDER — AMLODIPINE BESYLATE 10 MG/1
10 TABLET ORAL DAILY
Qty: 90 TABLET | Refills: 3 | Status: SHIPPED | OUTPATIENT
Start: 2022-06-29 | End: 2022-10-11

## 2022-06-29 RX ORDER — ATORVASTATIN CALCIUM 40 MG/1
TABLET, FILM COATED ORAL
Qty: 90 TABLET | Refills: 3 | Status: SHIPPED | OUTPATIENT
Start: 2022-06-29 | End: 2022-06-29 | Stop reason: SDUPTHER

## 2022-06-29 RX ORDER — DAPAGLIFLOZIN 10 MG/1
10 TABLET, FILM COATED ORAL DAILY
Qty: 30 TABLET | Refills: 6 | Status: SHIPPED | OUTPATIENT
Start: 2022-06-29 | End: 2022-06-29 | Stop reason: SDUPTHER

## 2022-06-29 RX ORDER — ATORVASTATIN CALCIUM 40 MG/1
TABLET, FILM COATED ORAL
Qty: 90 TABLET | Refills: 3 | Status: SHIPPED | OUTPATIENT
Start: 2022-06-29 | End: 2022-10-11 | Stop reason: SDUPTHER

## 2022-06-29 RX ORDER — INSULIN GLARGINE 100 [IU]/ML
60 INJECTION, SOLUTION SUBCUTANEOUS EVERY EVENING
Qty: 30 ML | Refills: 6 | Status: SHIPPED | OUTPATIENT
Start: 2022-06-29 | End: 2023-07-24

## 2022-06-29 RX ORDER — DAPAGLIFLOZIN 10 MG/1
10 TABLET, FILM COATED ORAL DAILY
Qty: 30 TABLET | Refills: 6 | Status: SHIPPED | OUTPATIENT
Start: 2022-06-29 | End: 2023-04-25 | Stop reason: SDUPTHER

## 2022-06-29 RX ORDER — DULAGLUTIDE 4.5 MG/.5ML
4.5 INJECTION, SOLUTION SUBCUTANEOUS
Qty: 2 ML | Refills: 6 | Status: SHIPPED | OUTPATIENT
Start: 2022-06-29 | End: 2023-08-15

## 2022-06-29 ASSESSMENT — PATIENT HEALTH QUESTIONNAIRE - PHQ9: CLINICAL INTERPRETATION OF PHQ2 SCORE: 0

## 2022-06-29 ASSESSMENT — FIBROSIS 4 INDEX: FIB4 SCORE: 1.1

## 2022-06-29 NOTE — PROGRESS NOTES
CHIEF COMPLAINT: Patient is here for follow up of Type 2 Diabetes Mellitus    HPI:     Joey Dawson is a 57 y.o. male with Type 2 Diabetes Mellitus here for follow up.    Labs from 6/29/2022 HbA1c is 9.3%  Previously his A1c was 11%      Comorbid issues include obesity hyperlipidemia and hypertension.  He was previously seen by the nurse practitioner and this is my second time meeting him  Unfortunately he had lost his insurance and job in February of this year which affected his ability to take his diabetes medications      Currently he is on Tresiba 50 units daily  Trulicity 3 mg weekly  Farxiga 10 mg daily  Metformin 1000 mg twice a day      He has been using a [employer] 2 CGM for over 6 months  We were not able to download his CGM today due to technical issues  His fasting sugars have been in the high 150s to 160s to 180s        He has hyperlipidemia and is on atorvastatin 40 mg daily  His LDL cholesterol was 56 on February 2021      He has hypertension and is on lisinopril 40 mg daily  He does have diabetic kidney disease  U ACR was elevated at 715 on April 2021        Incidentally he had a CT angiogram which showed an incidental 2 cm adrenal nodule in the left lobe  He has not had a formal thyroid ultrasound because of lack of insurance  TSH levels have not been checked recently        BG Diary:06/29/22  Patient is wearing a CGM    Weight has been stable    Diabetes Complications   Retinopathy: No known retinopathy.  Last eye exam: Patient is overdue for an eye exam  Neuropathy: Denies paresthesias or numbness in hands or feet. Denies any foot wounds.  Exercise: Minimal.  Diet: Fair.  Patient's medications, allergies, and social histories were reviewed and updated as appropriate.    ROS:     CONS:     No fever, no chills   EYES:     No diplopia, no blurry vision   CV:           No chest pain, no palpitations   PULM:     No SOB, no cough, no hemoptysis.   GI:            No nausea, no vomiting, no diarrhea, no  "constipation   ENDO:     No polyuria, no polydipsia, no heat intolerance, no cold intolerance       Past Medical History:  Problem List:  2022-06: Hyperlipidemia associated with type 2 diabetes mellitus ([facility])  2021-09: Diabetes mellitus ([facility])  2020-12: Essential hypertension  2020-12: Thyroid nodule  2020-11: DESTIN (obstructive sleep apnea)  2020-11: Acute hemorrhagic CVA (Pontine hemorrhage) ([facility])  2020-11: Elevated troponin  2020-10: Situational depression  2020-10: Trochanteric bursitis of left hip  2019-05: Soft tissue mass  2019-05: Obesity (BMI 30-39.9)  2015-06: ED (erectile dysfunction)  2015-04: Vitamin D deficiency  2015-04: RBC microcytosis  2015-04: Microalbuminuria  2015-04: Uncontrolled type 2 diabetes mellitus with hyperglycemia   ([facility])  2015-04: Hypertensive emergency  2015-04: Sleep apnea  2015-04: Mixed hyperlipidemia  2015-04: History of CVA (cerebrovascular accident) without residual   deficits      Past Surgical History:  No past surgical history on file.     Allergies:  Food     Social History:  Social History     Tobacco Use   • Smoking status: Never Smoker   • Smokeless tobacco: Never Used   Vaping Use   • Vaping Use: Never used   Substance Use Topics   • Alcohol use: Yes     Alcohol/week: 1.2 oz     Types: 2 Cans of beer per week     Comment: yearly   • Drug use: No        Family History:   family history includes Cancer in his mother; Heart Disease in his father.      PHYSICAL EXAM:   OBJECTIVE:  Vital signs: /82 (BP Location: Left arm, Patient Position: Sitting, BP Cuff Size: Large adult)   Pulse 72   Ht 1.702 m (5' 7\")   Wt 117 kg (257 lb)   SpO2 96%   BMI 40.25 kg/m²   GENERAL: Well-developed, well-nourished in no apparent distress.   EYE:  No ocular asymmetry, PERRLA  HENT: Pink, moist mucous membranes.    NECK: No thyromegaly.   CARDIOVASCULAR:  No murmurs  LUNGS: Clear breath sounds  ABDOMEN: Soft, nontender   EXTREMITIES: No clubbing, cyanosis, or edema.   NEUROLOGICAL: No " gross focal motor abnormalities   LYMPH: No cervical adenopathy palpated.   SKIN: No rashes, lesions.     Labs:  Lab Results   Component Value Date/Time    HBA1C 9.9 (A) 09/07/2021 05:12 PM        Lab Results   Component Value Date/Time    WBC 6.3 02/06/2021 08:56 AM    RBC 5.34 02/06/2021 08:56 AM    HEMOGLOBIN 14.2 02/06/2021 08:56 AM    MCV 82.6 02/06/2021 08:56 AM    MCH 26.6 (L) 02/06/2021 08:56 AM    MCHC 32.2 (L) 02/06/2021 08:56 AM    RDW 42.1 02/06/2021 08:56 AM    MPV 10.5 02/06/2021 08:56 AM       Lab Results   Component Value Date/Time    SODIUM 130 (L) 02/06/2021 08:56 AM    POTASSIUM 4.1 02/06/2021 08:56 AM    CHLORIDE 96 02/06/2021 08:56 AM    CO2 24 02/06/2021 08:56 AM    ANION 10.0 02/06/2021 08:56 AM    GLUCOSE 269 (H) 02/06/2021 08:56 AM    BUN 18 02/06/2021 08:56 AM    CREATININE 0.92 02/06/2021 08:56 AM    CALCIUM 8.9 02/06/2021 08:56 AM    ASTSGOT 16 02/06/2021 08:56 AM    ALTSGPT 18 02/06/2021 08:56 AM    TBILIRUBIN 0.4 02/06/2021 08:56 AM    ALBUMIN 3.9 02/06/2021 08:56 AM    TOTPROTEIN 6.6 02/06/2021 08:56 AM    GLOBULIN 2.7 02/06/2021 08:56 AM    AGRATIO 1.4 02/06/2021 08:56 AM       Lab Results   Component Value Date/Time    CHOLSTRLTOT 104 02/06/2021 0856    TRIGLYCERIDE 76 02/06/2021 0856    HDL 33 (A) 02/06/2021 0856    LDL 56 02/06/2021 0856       Lab Results   Component Value Date/Time    MALBCRT 715 (H) 04/24/2021 09:44 AM    MICROALBUR 133.8 04/24/2021 09:44 AM        No results found for: TSHULTRASEN  No results found for: FREEDIR  No results found for: FREET3  No results found for: THYSTIMIG        ASSESSMENT/PLAN:     1. Uncontrolled type 2 diabetes mellitus with hyperglycemia (HCC)  Uncontrolled secondary to hyperglycemia  A1c is better but still suboptimal  Replace Tresiba with Basaglar increase insulin to 60 units daily  Increase Trulicity to 4.5 mg weekly  Apply for [facility] patient assistance  Continue Farxiga 10 mg daily  Continue metformin  Recommend that he get updated  labs for serum creatinine fasting lipids and TSH and urine albumin once insurance is active  Follow-up with Pamela diabetes nurse in 3 months    2. Hyperlipidemia associated with type 2 diabetes mellitus (HCC)  Controlled  Continue atorvastatin  Repeat fasting lipids once insurance is active    3. Microalbuminuria  Unstable  Patient has diabetic kidney disease  Repeat serum creatinine, EGFR and urine albumin once insurance is active    4. Thyroid nodule  Stable  2 cm thyroid nodule on left lobe seen on CT  Patient will need a thyroid ultrasound once his insurance is active  Also recommend repeating TSH level to help reassure thyroid function is normal  He will get labs once his insurance is active    5. Essential hypertension  Controlled  Continue lisinopril  Repeat urine microalbumin once insurance is active      Return in about 3 months (around 2022).      This patient during there office visit today was started on a new medication.  Side effects of the new medication were discussed with the patient today in the office.     Thank you kindly for allowing me to participate in the diabetes care plan for this patient.      Medication Sample: Farxiga   Strength: 10 mg  Quantity: 2 sheets  Lot Number: [id number]   Date:       Medication Sample: Tresiba   Strength: U200  Quantity: 2 pens  Lot Number: [id number]   Date:       Medication Sample: Trulicity   Strength: 1.5 mg  Quantity: 3  Lot Number: [id number]   Date: 2024      Jose Marley MD, [facility]  [facility]    CC:   Pcp Pt States None

## 2022-06-30 ENCOUNTER — PHARMACY VISIT (OUTPATIENT)
Dept: PHARMACY | Facility: MEDICAL CENTER | Age: 57
End: 2022-06-30
Payer: COMMERCIAL

## 2022-06-30 PROCEDURE — RXMED WILLOW AMBULATORY MEDICATION CHARGE: Performed by: INTERNAL MEDICINE

## 2022-09-01 DIAGNOSIS — Z79.4 TYPE 2 DIABETES MELLITUS WITH OTHER SPECIFIED COMPLICATION, WITH LONG-TERM CURRENT USE OF INSULIN (HCC): ICD-10-CM

## 2022-09-01 DIAGNOSIS — E11.69 TYPE 2 DIABETES MELLITUS WITH OTHER SPECIFIED COMPLICATION, WITH LONG-TERM CURRENT USE OF INSULIN (HCC): ICD-10-CM

## 2022-09-01 PROCEDURE — RXMED WILLOW AMBULATORY MEDICATION CHARGE: Performed by: FAMILY MEDICINE

## 2022-09-01 PROCEDURE — RXMED WILLOW AMBULATORY MEDICATION CHARGE: Performed by: INTERNAL MEDICINE

## 2022-09-01 RX ORDER — METFORMIN HYDROCHLORIDE 500 MG/1
1000 TABLET, EXTENDED RELEASE ORAL 2 TIMES DAILY
Qty: 360 TABLET | Refills: 3 | Status: SHIPPED | OUTPATIENT
Start: 2022-09-01 | End: 2023-12-15 | Stop reason: SDUPTHER

## 2022-09-02 PROCEDURE — RXMED WILLOW AMBULATORY MEDICATION CHARGE: Performed by: INTERNAL MEDICINE

## 2022-09-06 ENCOUNTER — PHARMACY VISIT (OUTPATIENT)
Dept: PHARMACY | Facility: MEDICAL CENTER | Age: 57
End: 2022-09-06
Payer: COMMERCIAL

## 2022-10-07 ENCOUNTER — HOSPITAL ENCOUNTER (OUTPATIENT)
Dept: LAB | Facility: MEDICAL CENTER | Age: 57
End: 2022-10-07
Attending: NURSE PRACTITIONER
Payer: COMMERCIAL

## 2022-10-07 DIAGNOSIS — E78.5 HYPERLIPIDEMIA ASSOCIATED WITH TYPE 2 DIABETES MELLITUS (HCC): ICD-10-CM

## 2022-10-07 DIAGNOSIS — E11.65 UNCONTROLLED TYPE 2 DIABETES MELLITUS WITH HYPERGLYCEMIA (HCC): ICD-10-CM

## 2022-10-07 DIAGNOSIS — I10 ESSENTIAL HYPERTENSION: ICD-10-CM

## 2022-10-07 DIAGNOSIS — R80.9 MICROALBUMINURIA: ICD-10-CM

## 2022-10-07 DIAGNOSIS — E11.69 HYPERLIPIDEMIA ASSOCIATED WITH TYPE 2 DIABETES MELLITUS (HCC): ICD-10-CM

## 2022-10-07 DIAGNOSIS — E04.1 THYROID NODULE: ICD-10-CM

## 2022-10-07 LAB
ALBUMIN SERPL BCP-MCNC: 4.3 G/DL (ref 3.2–4.9)
ALBUMIN/GLOB SERPL: 1.3 G/DL
ALP SERPL-CCNC: 123 U/L (ref 30–99)
ALT SERPL-CCNC: 20 U/L (ref 2–50)
ANION GAP SERPL CALC-SCNC: 11 MMOL/L (ref 7–16)
AST SERPL-CCNC: 19 U/L (ref 12–45)
BILIRUB SERPL-MCNC: 0.3 MG/DL (ref 0.1–1.5)
BUN SERPL-MCNC: 16 MG/DL (ref 8–22)
CALCIUM SERPL-MCNC: 9.3 MG/DL (ref 8.5–10.5)
CHLORIDE SERPL-SCNC: 104 MMOL/L (ref 96–112)
CHOLEST SERPL-MCNC: 174 MG/DL (ref 100–199)
CO2 SERPL-SCNC: 24 MMOL/L (ref 20–33)
CREAT SERPL-MCNC: 0.91 MG/DL (ref 0.5–1.4)
CREAT UR-MCNC: 85.26 MG/DL
FASTING STATUS PATIENT QL REPORTED: NORMAL
GFR SERPLBLD CREATININE-BSD FMLA CKD-EPI: 97 ML/MIN/1.73 M 2
GLOBULIN SER CALC-MCNC: 3.2 G/DL (ref 1.9–3.5)
GLUCOSE SERPL-MCNC: 131 MG/DL (ref 65–99)
HDLC SERPL-MCNC: 39 MG/DL
LDLC SERPL CALC-MCNC: 112 MG/DL
MICROALBUMIN UR-MCNC: 36.6 MG/DL
MICROALBUMIN/CREAT UR: 429 MG/G (ref 0–30)
POTASSIUM SERPL-SCNC: 4.2 MMOL/L (ref 3.6–5.5)
PROT SERPL-MCNC: 7.5 G/DL (ref 6–8.2)
SODIUM SERPL-SCNC: 139 MMOL/L (ref 135–145)
TRIGL SERPL-MCNC: 113 MG/DL (ref 0–149)

## 2022-10-07 PROCEDURE — 82570 ASSAY OF URINE CREATININE: CPT

## 2022-10-07 PROCEDURE — 84439 ASSAY OF FREE THYROXINE: CPT

## 2022-10-07 PROCEDURE — 82043 UR ALBUMIN QUANTITATIVE: CPT

## 2022-10-07 PROCEDURE — 36415 COLL VENOUS BLD VENIPUNCTURE: CPT

## 2022-10-07 PROCEDURE — 80053 COMPREHEN METABOLIC PANEL: CPT

## 2022-10-07 PROCEDURE — 80061 LIPID PANEL: CPT

## 2022-10-07 PROCEDURE — 84443 ASSAY THYROID STIM HORMONE: CPT

## 2022-10-08 LAB
T4 FREE SERPL-MCNC: 1.14 NG/DL (ref 0.93–1.7)
TSH SERPL DL<=0.005 MIU/L-ACNC: 0.48 UIU/ML (ref 0.38–5.33)

## 2022-10-10 ENCOUNTER — DOCUMENTATION (OUTPATIENT)
Dept: PHARMACY | Facility: MEDICAL CENTER | Age: 57
End: 2022-10-10
Payer: COMMERCIAL

## 2022-10-10 NOTE — PROGRESS NOTES
Inbound call from pt returning my call regarding insurance update and refills. Pt stated he has an appt w/ Endo at [facility] tomorrow 10/11. Pt will update card at that time. Pt is unsure of copays and will be unable to pay for any copays until 10/13. Pt will call back after appt.

## 2022-10-10 NOTE — PROGRESS NOTES
Outbound call to pt regarding new insurance information. No answer. LVM for call back. I also sent my chart message.

## 2022-10-11 ENCOUNTER — NON-PROVIDER VISIT (OUTPATIENT)
Dept: ENDOCRINOLOGY | Facility: MEDICAL CENTER | Age: 57
End: 2022-10-11
Attending: INTERNAL MEDICINE
Payer: COMMERCIAL

## 2022-10-11 VITALS
HEART RATE: 76 BPM | DIASTOLIC BLOOD PRESSURE: 70 MMHG | BODY MASS INDEX: 40.34 KG/M2 | HEIGHT: 67 IN | WEIGHT: 257 LBS | OXYGEN SATURATION: 92 % | SYSTOLIC BLOOD PRESSURE: 126 MMHG

## 2022-10-11 DIAGNOSIS — E11.65 UNCONTROLLED TYPE 2 DIABETES MELLITUS WITH HYPERGLYCEMIA (HCC): ICD-10-CM

## 2022-10-11 DIAGNOSIS — E11.69 HYPERLIPIDEMIA ASSOCIATED WITH TYPE 2 DIABETES MELLITUS (HCC): ICD-10-CM

## 2022-10-11 DIAGNOSIS — E78.5 HYPERLIPIDEMIA ASSOCIATED WITH TYPE 2 DIABETES MELLITUS (HCC): ICD-10-CM

## 2022-10-11 DIAGNOSIS — I10 ESSENTIAL HYPERTENSION: ICD-10-CM

## 2022-10-11 LAB
HBA1C MFR BLD: 7.4 % (ref 0–5.6)
INT CON NEG: ABNORMAL
INT CON POS: ABNORMAL

## 2022-10-11 PROCEDURE — RXMED WILLOW AMBULATORY MEDICATION CHARGE: Performed by: INTERNAL MEDICINE

## 2022-10-11 PROCEDURE — 99212 OFFICE O/P EST SF 10 MIN: CPT | Performed by: INTERNAL MEDICINE

## 2022-10-11 PROCEDURE — 83036 HEMOGLOBIN GLYCOSYLATED A1C: CPT

## 2022-10-11 RX ORDER — LISINOPRIL 40 MG/1
TABLET ORAL
Qty: 90 TABLET | Refills: 3 | Status: SHIPPED | OUTPATIENT
Start: 2022-10-11 | End: 2023-10-27 | Stop reason: SDUPTHER

## 2022-10-11 RX ORDER — ERGOCALCIFEROL 1.25 MG/1
50000 CAPSULE ORAL
Qty: 12 CAPSULE | Refills: 3 | Status: SHIPPED | OUTPATIENT
Start: 2022-10-11 | End: 2023-09-14 | Stop reason: SDUPTHER

## 2022-10-11 RX ORDER — ATORVASTATIN CALCIUM 40 MG/1
TABLET, FILM COATED ORAL
Qty: 90 TABLET | Refills: 3 | Status: SHIPPED | OUTPATIENT
Start: 2022-10-11 | End: 2023-10-10 | Stop reason: SDUPTHER

## 2022-10-11 ASSESSMENT — FIBROSIS 4 INDEX: FIB4 SCORE: 1.23

## 2022-10-11 NOTE — PROGRESS NOTES
RN-CDE Note    Subjective:   Endocrinology Clinic Progress Note  PCP: Pcp Pt States None    HPI:  Joey Dawson is a 57 y.o. old patient who is seen today by the Diabetes Nurse Specialist for review of type 2 Diabetes.  Recent changes in health: Health good.  He has a new job driving bus.  DM:   Last A1c:   Lab Results   Component Value Date/Time    HBA1C 7.4 (A) 10/11/2022 09:14 AM      Previous A1c was 9.3 on 6/29/22  A1C GOAL: < 7    Diabetes Medications:   Basaglar 60 units daily  Trulicity 4.5 mg weekly  Farxiga 10 mg daily  Metformin  mg 2 BID      Exercise: Starting to walk more and bowling  Diet: Working swing shift and eats before he goes to work and after work.  Patient's body mass index is 40.25 kg/m². Exercise and nutrition counseling were performed at this visit.    Glucose monitoring frequency: Using Olivia 2    Hypoglycemic episodes: no  Last Retinal Exam:  November 2021 in [place] or [facility]  Daily Foot Exam: Yes   Foot Exam:  Monofilament: done  Monofilament testing with a 10 gram force: sensation: intact bilaterally  Visual Inspection: Feet without maceration, ulcers, or fissures.  Pedal pulses: intact bilaterally   Lab Results   Component Value Date/Time    MALBCRT 429 (H) 10/07/2022 10:31 AM    MICROALBUR 36.6 10/07/2022 10:31 AM        ACR Albumin/Creatinine Ratio goal <30     HTN:   Blood pressure goal <140/<80 .   Currently Rx ACE/ARB: Yes     Dyslipidemia:    Lab Results   Component Value Date/Time    CHOLSTRLTOT 174 10/07/2022 10:31 AM     (H) 10/07/2022 10:31 AM    HDL 39 (A) 10/07/2022 10:31 AM    TRIGLYCERIDE 113 10/07/2022 10:31 AM         Currently Rx Statin: Yes     He  reports that he has never smoked. He has never used smokeless tobacco.      Plan:     Discussed and educated on:   - All medications, side effects and compliance (discussed carefully)  - Annual eye examinations at Ophthalmology  - Home glucose monitoring emphasized  - Weight control and daily  exercise    Recommended medication changes: No changes at this time.  Follow up in 6 months with Pérez LEWIS.

## 2022-10-13 ENCOUNTER — PHARMACY VISIT (OUTPATIENT)
Dept: PHARMACY | Facility: MEDICAL CENTER | Age: 57
End: 2022-10-13
Payer: COMMERCIAL

## 2022-10-25 DIAGNOSIS — E11.65 UNCONTROLLED TYPE 2 DIABETES MELLITUS WITH HYPERGLYCEMIA (HCC): ICD-10-CM

## 2022-10-26 PROCEDURE — RXMED WILLOW AMBULATORY MEDICATION CHARGE: Performed by: INTERNAL MEDICINE

## 2022-10-27 PROCEDURE — RXMED WILLOW AMBULATORY MEDICATION CHARGE: Performed by: INTERNAL MEDICINE

## 2022-10-31 ENCOUNTER — PHARMACY VISIT (OUTPATIENT)
Dept: PHARMACY | Facility: MEDICAL CENTER | Age: 57
End: 2022-10-31
Payer: COMMERCIAL

## 2022-11-08 DIAGNOSIS — E11.65 UNCONTROLLED TYPE 2 DIABETES MELLITUS WITH HYPERGLYCEMIA (HCC): ICD-10-CM

## 2022-11-09 ENCOUNTER — DOCUMENTATION (OUTPATIENT)
Dept: PHARMACY | Facility: MEDICAL CENTER | Age: 57
End: 2022-11-09
Payer: COMMERCIAL

## 2022-11-09 NOTE — PROGRESS NOTES
I have spoken to pt regarding Olivia sensors and Farxiga. Pt stated he just got these refilled and received them on 10/31. Pt is no longer on Toujeo. He has 90 days remaining on Farxiga and over 3 weeks on sensors. Pt is going great on therapy and reports no changes. Pt requesting a call back 11/28 for sensors. Pt had no questions.

## 2022-11-28 PROCEDURE — RXMED WILLOW AMBULATORY MEDICATION CHARGE: Performed by: INTERNAL MEDICINE

## 2022-11-29 ENCOUNTER — PHARMACY VISIT (OUTPATIENT)
Dept: PHARMACY | Facility: MEDICAL CENTER | Age: 57
End: 2022-11-29
Payer: COMMERCIAL

## 2022-12-07 PROCEDURE — RXMED WILLOW AMBULATORY MEDICATION CHARGE: Performed by: FAMILY MEDICINE

## 2022-12-08 ENCOUNTER — PHARMACY VISIT (OUTPATIENT)
Dept: PHARMACY | Facility: MEDICAL CENTER | Age: 57
End: 2022-12-08
Payer: COMMERCIAL

## 2022-12-28 PROCEDURE — RXMED WILLOW AMBULATORY MEDICATION CHARGE: Performed by: INTERNAL MEDICINE

## 2023-01-06 ENCOUNTER — PHARMACY VISIT (OUTPATIENT)
Dept: PHARMACY | Facility: MEDICAL CENTER | Age: 58
End: 2023-01-06
Payer: COMMERCIAL

## 2023-01-08 SDOH — ECONOMIC STABILITY: HOUSING INSECURITY: IN THE LAST 12 MONTHS, HOW MANY PLACES HAVE YOU LIVED?: 1

## 2023-01-08 SDOH — ECONOMIC STABILITY: TRANSPORTATION INSECURITY
IN THE PAST 12 MONTHS, HAS LACK OF RELIABLE TRANSPORTATION KEPT YOU FROM MEDICAL APPOINTMENTS, MEETINGS, WORK OR FROM GETTING THINGS NEEDED FOR DAILY LIVING?: NO

## 2023-01-08 SDOH — HEALTH STABILITY: PHYSICAL HEALTH
ON AVERAGE, HOW MANY DAYS PER WEEK DO YOU ENGAGE IN MODERATE TO STRENUOUS EXERCISE (LIKE A BRISK WALK)?: PATIENT DECLINED

## 2023-01-08 SDOH — HEALTH STABILITY: MENTAL HEALTH
STRESS IS WHEN SOMEONE FEELS TENSE, NERVOUS, ANXIOUS, OR CAN'T SLEEP AT NIGHT BECAUSE THEIR MIND IS TROUBLED. HOW STRESSED ARE YOU?: NOT AT ALL

## 2023-01-08 SDOH — ECONOMIC STABILITY: HOUSING INSECURITY
IN THE LAST 12 MONTHS, WAS THERE A TIME WHEN YOU DID NOT HAVE A STEADY PLACE TO SLEEP OR SLEPT IN A SHELTER (INCLUDING NOW)?: NO

## 2023-01-08 SDOH — ECONOMIC STABILITY: FOOD INSECURITY: WITHIN THE PAST 12 MONTHS, YOU WORRIED THAT YOUR FOOD WOULD RUN OUT BEFORE YOU GOT MONEY TO BUY MORE.: NEVER TRUE

## 2023-01-08 SDOH — HEALTH STABILITY: PHYSICAL HEALTH: ON AVERAGE, HOW MANY MINUTES DO YOU ENGAGE IN EXERCISE AT THIS LEVEL?: PATIENT DECLINED

## 2023-01-08 SDOH — ECONOMIC STABILITY: FOOD INSECURITY: WITHIN THE PAST 12 MONTHS, THE FOOD YOU BOUGHT JUST DIDN'T LAST AND YOU DIDN'T HAVE MONEY TO GET MORE.: NEVER TRUE

## 2023-01-08 SDOH — ECONOMIC STABILITY: TRANSPORTATION INSECURITY
IN THE PAST 12 MONTHS, HAS THE LACK OF TRANSPORTATION KEPT YOU FROM MEDICAL APPOINTMENTS OR FROM GETTING MEDICATIONS?: NO

## 2023-01-08 SDOH — ECONOMIC STABILITY: INCOME INSECURITY: HOW HARD IS IT FOR YOU TO PAY FOR THE VERY BASICS LIKE FOOD, HOUSING, MEDICAL CARE, AND HEATING?: NOT HARD AT ALL

## 2023-01-08 SDOH — ECONOMIC STABILITY: INCOME INSECURITY: IN THE LAST 12 MONTHS, WAS THERE A TIME WHEN YOU WERE NOT ABLE TO PAY THE MORTGAGE OR RENT ON TIME?: NO

## 2023-01-08 SDOH — ECONOMIC STABILITY: TRANSPORTATION INSECURITY
IN THE PAST 12 MONTHS, HAS LACK OF TRANSPORTATION KEPT YOU FROM MEETINGS, WORK, OR FROM GETTING THINGS NEEDED FOR DAILY LIVING?: NO

## 2023-01-08 ASSESSMENT — SOCIAL DETERMINANTS OF HEALTH (SDOH)
HOW OFTEN DO YOU HAVE SIX OR MORE DRINKS ON ONE OCCASION: NEVER
IN A TYPICAL WEEK, HOW MANY TIMES DO YOU TALK ON THE PHONE WITH FAMILY, FRIENDS, OR NEIGHBORS?: MORE THAN THREE TIMES A WEEK
HOW OFTEN DO YOU ATTENT MEETINGS OF THE CLUB OR ORGANIZATION YOU BELONG TO?: NEVER
IN A TYPICAL WEEK, HOW MANY TIMES DO YOU TALK ON THE PHONE WITH FAMILY, FRIENDS, OR NEIGHBORS?: MORE THAN THREE TIMES A WEEK
WITHIN THE PAST 12 MONTHS, YOU WORRIED THAT YOUR FOOD WOULD RUN OUT BEFORE YOU GOT THE MONEY TO BUY MORE: NEVER TRUE
HOW MANY DRINKS CONTAINING ALCOHOL DO YOU HAVE ON A TYPICAL DAY WHEN YOU ARE DRINKING: 1 OR 2
HOW OFTEN DO YOU GET TOGETHER WITH FRIENDS OR RELATIVES?: MORE THAN THREE TIMES A WEEK
HOW HARD IS IT FOR YOU TO PAY FOR THE VERY BASICS LIKE FOOD, HOUSING, MEDICAL CARE, AND HEATING?: NOT HARD AT ALL
DO YOU BELONG TO ANY CLUBS OR ORGANIZATIONS SUCH AS CHURCH GROUPS UNIONS, FRATERNAL OR ATHLETIC GROUPS, OR SCHOOL GROUPS?: NO
ARE YOU MARRIED, WIDOWED, DIVORCED, SEPARATED, NEVER MARRIED, OR LIVING WITH A PARTNER?: NEVER MARRIED
ARE YOU MARRIED, WIDOWED, DIVORCED, SEPARATED, NEVER MARRIED, OR LIVING WITH A PARTNER?: NEVER MARRIED
HOW OFTEN DO YOU ATTENT MEETINGS OF THE CLUB OR ORGANIZATION YOU BELONG TO?: NEVER
HOW OFTEN DO YOU HAVE A DRINK CONTAINING ALCOHOL: MONTHLY OR LESS
HOW OFTEN DO YOU ATTEND CHURCH OR RELIGIOUS SERVICES?: NEVER
HOW OFTEN DO YOU ATTEND CHURCH OR RELIGIOUS SERVICES?: NEVER
DO YOU BELONG TO ANY CLUBS OR ORGANIZATIONS SUCH AS CHURCH GROUPS UNIONS, FRATERNAL OR ATHLETIC GROUPS, OR SCHOOL GROUPS?: NO
HOW OFTEN DO YOU GET TOGETHER WITH FRIENDS OR RELATIVES?: MORE THAN THREE TIMES A WEEK

## 2023-01-08 ASSESSMENT — LIFESTYLE VARIABLES
HOW OFTEN DO YOU HAVE A DRINK CONTAINING ALCOHOL: MONTHLY OR LESS
SKIP TO QUESTIONS 9-10: 1
HOW OFTEN DO YOU HAVE SIX OR MORE DRINKS ON ONE OCCASION: NEVER
HOW MANY STANDARD DRINKS CONTAINING ALCOHOL DO YOU HAVE ON A TYPICAL DAY: 1 OR 2
AUDIT-C TOTAL SCORE: 1

## 2023-01-09 PROCEDURE — RXMED WILLOW AMBULATORY MEDICATION CHARGE: Performed by: INTERNAL MEDICINE

## 2023-01-10 ENCOUNTER — PHARMACY VISIT (OUTPATIENT)
Dept: PHARMACY | Facility: MEDICAL CENTER | Age: 58
End: 2023-01-10
Payer: COMMERCIAL

## 2023-01-10 PROCEDURE — RXMED WILLOW AMBULATORY MEDICATION CHARGE: Performed by: INTERNAL MEDICINE

## 2023-01-11 ENCOUNTER — OFFICE VISIT (OUTPATIENT)
Dept: MEDICAL GROUP | Facility: PHYSICIAN GROUP | Age: 58
End: 2023-01-11
Payer: COMMERCIAL

## 2023-01-11 VITALS
HEART RATE: 73 BPM | TEMPERATURE: 97.5 F | HEIGHT: 68 IN | RESPIRATION RATE: 16 BRPM | BODY MASS INDEX: 38.74 KG/M2 | OXYGEN SATURATION: 95 % | WEIGHT: 255.6 LBS | DIASTOLIC BLOOD PRESSURE: 84 MMHG | SYSTOLIC BLOOD PRESSURE: 138 MMHG

## 2023-01-11 DIAGNOSIS — G47.33 OSA (OBSTRUCTIVE SLEEP APNEA): ICD-10-CM

## 2023-01-11 DIAGNOSIS — E11.65 UNCONTROLLED TYPE 2 DIABETES MELLITUS WITH HYPERGLYCEMIA (HCC): ICD-10-CM

## 2023-01-11 DIAGNOSIS — E55.9 VITAMIN D DEFICIENCY: ICD-10-CM

## 2023-01-11 DIAGNOSIS — R53.83 OTHER FATIGUE: ICD-10-CM

## 2023-01-11 DIAGNOSIS — Z23 NEED FOR VACCINATION: ICD-10-CM

## 2023-01-11 DIAGNOSIS — Z00.00 HEALTHCARE MAINTENANCE: ICD-10-CM

## 2023-01-11 DIAGNOSIS — Z12.5 ENCOUNTER FOR SCREENING FOR MALIGNANT NEOPLASM OF PROSTATE: ICD-10-CM

## 2023-01-11 DIAGNOSIS — N52.9 ERECTILE DYSFUNCTION, UNSPECIFIED ERECTILE DYSFUNCTION TYPE: ICD-10-CM

## 2023-01-11 PROCEDURE — 99214 OFFICE O/P EST MOD 30 MIN: CPT | Mod: 25 | Performed by: NURSE PRACTITIONER

## 2023-01-11 PROCEDURE — 90746 HEPB VACCINE 3 DOSE ADULT IM: CPT | Performed by: NURSE PRACTITIONER

## 2023-01-11 PROCEDURE — 90471 IMMUNIZATION ADMIN: CPT | Performed by: NURSE PRACTITIONER

## 2023-01-11 PROCEDURE — 90472 IMMUNIZATION ADMIN EACH ADD: CPT | Performed by: NURSE PRACTITIONER

## 2023-01-11 PROCEDURE — 90732 PPSV23 VACC 2 YRS+ SUBQ/IM: CPT | Performed by: NURSE PRACTITIONER

## 2023-01-11 RX ORDER — TAMSULOSIN HYDROCHLORIDE 0.4 MG/1
0.4 CAPSULE ORAL
Qty: 90 CAPSULE | Refills: 3 | Status: SHIPPED | OUTPATIENT
Start: 2023-01-11 | End: 2023-04-12 | Stop reason: SINTOL

## 2023-01-11 ASSESSMENT — PATIENT HEALTH QUESTIONNAIRE - PHQ9: CLINICAL INTERPRETATION OF PHQ2 SCORE: 0

## 2023-01-11 ASSESSMENT — FIBROSIS 4 INDEX: FIB4 SCORE: 1.23

## 2023-01-11 NOTE — ASSESSMENT & PLAN NOTE
Uses CPAP nightly; feels rested w/CPAP but not as much lately w/abd pain  Last sleep study 1 1/2 years ago

## 2023-01-11 NOTE — PROGRESS NOTES
"Subjective:     CC:   Chief Complaint   Patient presents with    Establish Care     Former pt of Lynette       HPI:   Joey presents today with    Uncontrolled type 2 diabetes mellitus with hyperglycemia (HCC)  Followed by Dr. Marley in endocrinology   most recent A1c in October was 7.4    ED (erectile dysfunction)  Reports ED inhibiting his ability to use the bathroom at night;  wakes him up w/abd pain    Healthcare maintenance  Has been about a year  Will schedule w/ophthalmology in near future   Declines flu shot but will get hep b and  pneumonia  Will check into shingrix at the pharmacy    DESTIN (obstructive sleep apnea)  Uses CPAP nightly; feels rested w/CPAP but not as much lately w/abd pain  Last sleep study 1 1/2 years ago          ROS per HPI    Objective:     Exam:  /84 (BP Location: Left arm, Patient Position: Sitting, BP Cuff Size: Adult long)   Pulse 73   Temp 36.4 °C (97.5 °F) (Temporal)   Resp 16   Ht 1.727 m (5' 8\")   Wt 116 kg (255 lb 9.6 oz)   SpO2 95%   BMI 38.86 kg/m²  Body mass index is 38.86 kg/m².    Physical Exam:  Constitutional: Well-developed and well-nourished  male  Not diaphoretic. No distress.   Skin: warm, dry, intact, no evidence of rash or concerning lesions  Head: Atraumatic without lesions.  Eyes: Conjunctivae are normal. Pupils are equal, round. No scleral icterus.   Ears:  External ears unremarkable.   Neck: Supple, trachea midline. No thyromegaly present.   Cardiovascular: Regular rate and rhythm without murmur.   Pulmonary: Clear to ausculation. Normal effort. No rales, ronchi, or wheezing.  Extremities: No cyanosis, clubbing, erythema, nor edema.   Neurological: Alert and oriented x 3.   Psychiatric:  Behavior, mood, and affect are appropriate.        Assessment & Plan:     57 y.o. male with the following -     1. Erectile dysfunction, unspecified erectile dysfunction type  - tamsulosin (FLOMAX) 0.4 MG capsule; Take 1 Capsule by mouth 1/2 hour after breakfast.  " Dispense: 90 Capsule; Refill: 3    2. Encounter for screening for malignant neoplasm of prostate  - PROSTATE SPECIFIC AG SCREENING; Future    3. Need for vaccination  - Pneumovax Vaccine (PPSV23)  - Hep B Adult 20+    4. Uncontrolled type 2 diabetes mellitus with hyperglycemia (HCC)  F/up w/endocrine in April as scheduled     5. Healthcare maintenance  Will complete labs in near future     6. Other fatigue  - CBC WITH DIFFERENTIAL; Future    7. Vitamin D deficiency  - VITAMIN D,25 HYDROXY (DEFICIENCY); Future  Currently taking 50,000 units qwk        Return in 6 months (on 7/11/2023) for gen check in.  Will message pt w/lab results     Please note that this dictation was created using voice recognition software. I have made every reasonable attempt to correct obvious errors, but I expect that there are errors of grammar and possibly content that I did not discover before finalizing the note.

## 2023-01-13 ENCOUNTER — HOSPITAL ENCOUNTER (OUTPATIENT)
Dept: LAB | Facility: MEDICAL CENTER | Age: 58
End: 2023-01-13
Attending: NURSE PRACTITIONER
Payer: COMMERCIAL

## 2023-01-13 DIAGNOSIS — E55.9 VITAMIN D DEFICIENCY: ICD-10-CM

## 2023-01-13 DIAGNOSIS — R53.83 OTHER FATIGUE: ICD-10-CM

## 2023-01-13 DIAGNOSIS — Z12.5 ENCOUNTER FOR SCREENING FOR MALIGNANT NEOPLASM OF PROSTATE: ICD-10-CM

## 2023-01-13 LAB
25(OH)D3 SERPL-MCNC: 39 NG/ML (ref 30–100)
BASOPHILS # BLD AUTO: 0.7 % (ref 0–1.8)
BASOPHILS # BLD: 0.05 K/UL (ref 0–0.12)
EOSINOPHIL # BLD AUTO: 0.16 K/UL (ref 0–0.51)
EOSINOPHIL NFR BLD: 2.2 % (ref 0–6.9)
ERYTHROCYTE [DISTWIDTH] IN BLOOD BY AUTOMATED COUNT: 46.6 FL (ref 35.9–50)
HCT VFR BLD AUTO: 46.5 % (ref 42–52)
HGB BLD-MCNC: 15 G/DL (ref 14–18)
IMM GRANULOCYTES # BLD AUTO: 0.02 K/UL (ref 0–0.11)
IMM GRANULOCYTES NFR BLD AUTO: 0.3 % (ref 0–0.9)
LYMPHOCYTES # BLD AUTO: 1.9 K/UL (ref 1–4.8)
LYMPHOCYTES NFR BLD: 26.4 % (ref 22–41)
MCH RBC QN AUTO: 25.8 PG (ref 27–33)
MCHC RBC AUTO-ENTMCNC: 32.3 G/DL (ref 33.7–35.3)
MCV RBC AUTO: 80 FL (ref 81.4–97.8)
MONOCYTES # BLD AUTO: 0.56 K/UL (ref 0–0.85)
MONOCYTES NFR BLD AUTO: 7.8 % (ref 0–13.4)
NEUTROPHILS # BLD AUTO: 4.52 K/UL (ref 1.82–7.42)
NEUTROPHILS NFR BLD: 62.6 % (ref 44–72)
NRBC # BLD AUTO: 0 K/UL
NRBC BLD-RTO: 0 /100 WBC
PLATELET # BLD AUTO: 229 K/UL (ref 164–446)
PMV BLD AUTO: 10.1 FL (ref 9–12.9)
PSA SERPL-MCNC: 0.59 NG/ML (ref 0–4)
RBC # BLD AUTO: 5.81 M/UL (ref 4.7–6.1)
WBC # BLD AUTO: 7.2 K/UL (ref 4.8–10.8)

## 2023-01-13 PROCEDURE — 36415 COLL VENOUS BLD VENIPUNCTURE: CPT

## 2023-01-13 PROCEDURE — 85025 COMPLETE CBC W/AUTO DIFF WBC: CPT

## 2023-01-13 PROCEDURE — 84153 ASSAY OF PSA TOTAL: CPT

## 2023-01-13 PROCEDURE — 82306 VITAMIN D 25 HYDROXY: CPT

## 2023-01-18 PROCEDURE — RXMED WILLOW AMBULATORY MEDICATION CHARGE: Performed by: INTERNAL MEDICINE

## 2023-01-25 ENCOUNTER — PHARMACY VISIT (OUTPATIENT)
Dept: PHARMACY | Facility: MEDICAL CENTER | Age: 58
End: 2023-01-25
Payer: COMMERCIAL

## 2023-01-27 PROCEDURE — RXMED WILLOW AMBULATORY MEDICATION CHARGE: Performed by: NURSE PRACTITIONER

## 2023-02-03 ENCOUNTER — PHARMACY VISIT (OUTPATIENT)
Dept: PHARMACY | Facility: MEDICAL CENTER | Age: 58
End: 2023-02-03
Payer: COMMERCIAL

## 2023-02-23 ENCOUNTER — DOCUMENTATION (OUTPATIENT)
Dept: PHARMACY | Facility: MEDICAL CENTER | Age: 58
End: 2023-02-23
Payer: COMMERCIAL

## 2023-02-23 NOTE — PROGRESS NOTES
02/22/23: Spoke to patient, patient stated he has about 6 or more weeks of Freestyle Olivia 2 Sensors. Does not need refill. Overall doing pretty good, but has missed some doses. Stated he will start taking it again. I advised we will follow up 04/10

## 2023-02-27 PROCEDURE — RXMED WILLOW AMBULATORY MEDICATION CHARGE: Performed by: INTERNAL MEDICINE

## 2023-02-28 PROCEDURE — RXMED WILLOW AMBULATORY MEDICATION CHARGE: Performed by: FAMILY MEDICINE

## 2023-03-07 ENCOUNTER — PHARMACY VISIT (OUTPATIENT)
Dept: PHARMACY | Facility: MEDICAL CENTER | Age: 58
End: 2023-03-07
Payer: COMMERCIAL

## 2023-03-16 DIAGNOSIS — F43.21 SITUATIONAL DEPRESSION: ICD-10-CM

## 2023-03-16 PROCEDURE — RXMED WILLOW AMBULATORY MEDICATION CHARGE: Performed by: NURSE PRACTITIONER

## 2023-03-16 NOTE — TELEPHONE ENCOUNTER
Received request via: Pharmacy    Was the patient seen in the last year in this department? Yes    Does the patient have an active prescription (recently filled or refills available) for medication(s) requested? No    Does the patient have [facility] and need 100 day supply (blood pressure, diabetes and cholesterol meds only)? Patient does not have SCP    Last office Visit:01/11/2023  Last labs:01/13/2023

## 2023-03-17 ENCOUNTER — PHARMACY VISIT (OUTPATIENT)
Dept: PHARMACY | Facility: MEDICAL CENTER | Age: 58
End: 2023-03-17
Payer: COMMERCIAL

## 2023-03-20 ENCOUNTER — PHARMACY VISIT (OUTPATIENT)
Dept: PHARMACY | Facility: MEDICAL CENTER | Age: 58
End: 2023-03-20
Payer: COMMERCIAL

## 2023-03-20 PROCEDURE — RXMED WILLOW AMBULATORY MEDICATION CHARGE: Performed by: INTERNAL MEDICINE

## 2023-04-07 PROCEDURE — RXMED WILLOW AMBULATORY MEDICATION CHARGE: Performed by: INTERNAL MEDICINE

## 2023-04-10 ENCOUNTER — PHARMACY VISIT (OUTPATIENT)
Dept: PHARMACY | Facility: MEDICAL CENTER | Age: 58
End: 2023-04-10
Payer: COMMERCIAL

## 2023-04-12 ENCOUNTER — OFFICE VISIT (OUTPATIENT)
Dept: ENDOCRINOLOGY | Facility: MEDICAL CENTER | Age: 58
End: 2023-04-12
Payer: COMMERCIAL

## 2023-04-12 VITALS
HEART RATE: 85 BPM | OXYGEN SATURATION: 92 % | WEIGHT: 251.4 LBS | HEIGHT: 68 IN | DIASTOLIC BLOOD PRESSURE: 78 MMHG | BODY MASS INDEX: 38.1 KG/M2 | SYSTOLIC BLOOD PRESSURE: 126 MMHG

## 2023-04-12 DIAGNOSIS — E55.9 VITAMIN D DEFICIENCY: ICD-10-CM

## 2023-04-12 DIAGNOSIS — E11.69 HYPERLIPIDEMIA DUE TO TYPE 2 DIABETES MELLITUS (HCC): ICD-10-CM

## 2023-04-12 DIAGNOSIS — I10 ESSENTIAL HYPERTENSION: ICD-10-CM

## 2023-04-12 DIAGNOSIS — R80.9 MICROALBUMINURIA: ICD-10-CM

## 2023-04-12 DIAGNOSIS — E11.9 TYPE 2 DIABETES MELLITUS WITHOUT COMPLICATION, WITHOUT LONG-TERM CURRENT USE OF INSULIN (HCC): ICD-10-CM

## 2023-04-12 DIAGNOSIS — E78.5 HYPERLIPIDEMIA DUE TO TYPE 2 DIABETES MELLITUS (HCC): ICD-10-CM

## 2023-04-12 LAB
HBA1C MFR BLD: 7.4 % (ref ?–5.8)
POCT INT CON NEG: NEGATIVE
POCT INT CON POS: POSITIVE

## 2023-04-12 PROCEDURE — 83036 HEMOGLOBIN GLYCOSYLATED A1C: CPT

## 2023-04-12 PROCEDURE — 99214 OFFICE O/P EST MOD 30 MIN: CPT

## 2023-04-12 PROCEDURE — 99212 OFFICE O/P EST SF 10 MIN: CPT

## 2023-04-12 ASSESSMENT — FIBROSIS 4 INDEX: FIB4 SCORE: 1.09

## 2023-04-12 NOTE — PROGRESS NOTES
CHIEF COMPLAINT: Patient is here for follow up of   HPI:   Joey Dawson is a 57 y.o. male       Type 2 diabetes mellitus with hyperglycemia:     POC A1c on 4/12/2023 at 7.4%  POC A1c on 10/11/2022 was 7.4%  Labs from 6/29/2022 HbA1c is 9.3%  Previously his A1c was 11%    Medication regimen:  Metformin 1000 mg twice a day  Glargine 60 units at night  Trulicity 4.5 mg weekly  Farxiga 10 mg daily    Diabetes Complications   Retinopathy: No known retinopathy.    Last eye exam: last Nov 2022-Dr Thomas     Neuropathy: Denies paresthesias or numbness in hands or feet.   Denies any foot wounds.  Hx of stroke Nov 2021    Exercise: Minimal, will begin next month   Diet: Fair.    2.  Microalbuminuria:  Currently taking lisinopril 40 mg daily  Blood pressure today at 126/78   Latest Reference Range & Units 10/07/22 10:31   Micro Alb Creat Ratio 0 - 30 mg/g 429 (H)   Creatinine, Urine mg/dL 85.26   Microalbumin, Urine Random mg/dL 36.6       3.Hyperlipidemia associated with type 2 diabetes mellitus:  He has hyperlipidemia and is on atorvastatin 40 mg daily   Latest Reference Range & Units 10/07/22 10:31   Cholesterol,Tot 100 - 199 mg/dL 174   Triglycerides 0 - 149 mg/dL 113   HDL >=40 mg/dL 39 !   LDL <100 mg/dL 112 (H)     4.  Essential hypertension:  Currently taking lisinopril 40 mg daily  BP at 126/78    5.  Vitamin D deficiency:  Daily weekly 35395 IUs weekly   Latest Reference Range & Units 01/13/23 14:40   25-Hydroxy   Vitamin D 25 30 - 100 ng/mL 39         Patient's medications, allergies, and social histories were reviewed and updated as appropriate.    ROS:     CONS:     No fever, no chills   EYES:     No diplopia, no blurry vision   CV:           No chest pain, no palpitations   PULM:     No SOB, no cough, no hemoptysis.   GI:            No nausea, no vomiting, no diarrhea, no constipation   ENDO:     No polyuria, no polydipsia, no heat intolerance, no cold intolerance       Past Medical History:  Problem  "List:  2023-01: Healthcare maintenance  2022-06: Hyperlipidemia associated with type 2 diabetes mellitus ([facility])  2021-09: Diabetes mellitus ([facility])  2020-12: Essential hypertension  2020-12: Thyroid nodule  2020-11: DESTIN (obstructive sleep apnea)  2020-11: Acute hemorrhagic CVA (Pontine hemorrhage) ([facility])  2020-11: Elevated troponin  2020-10: Situational depression  2020-10: Trochanteric bursitis of left hip  2019-05: Soft tissue mass  2019-05: Obesity (BMI 30-39.9)  2015-06: ED (erectile dysfunction)  2015-04: Vitamin D deficiency  2015-04: RBC microcytosis  2015-04: Microalbuminuria  2015-04: Uncontrolled type 2 diabetes mellitus with hyperglycemia   ([facility])  2015-04: Hypertensive emergency  2015-04: Sleep apnea  2015-04: Mixed hyperlipidemia  2015-04: History of CVA (cerebrovascular accident) without residual   deficits      Past Surgical History:  No past surgical history on file.     Allergies:  Food     Social History:  Social History     Tobacco Use    Smoking status: Never    Smokeless tobacco: Never   Vaping Use    Vaping Use: Never used   Substance Use Topics    Alcohol use: Yes     Alcohol/week: 1.2 oz     Types: 2 Cans of beer per week     Comment: yearly    Drug use: No        Family History:   family history includes Cancer in his mother; Heart Disease in his father; No Known Problems in his sister.      PHYSICAL EXAM:   OBJECTIVE:  Vital signs: /78 (BP Location: Left arm, Patient Position: Sitting)   Pulse 85   Ht 1.727 m (5' 8\")   Wt 114 kg (251 lb 6.4 oz)   SpO2 92%   BMI 38.23 kg/m²   GENERAL: Well-developed, well-nourished in no apparent distress.   EYE:  No ocular asymmetry, PERRLA  HENT: Pink, moist mucous membranes.    NECK: No thyromegaly.   CARDIOVASCULAR:  No murmurs  LUNGS: Clear breath sounds  EXTREMITIES: No clubbing, cyanosis, or edema.   NEUROLOGICAL: No gross focal motor abnormalities   LYMPH: No cervical adenopathy palpated.   SKIN: No rashes, lesions.     Labs:  Lab Results "   Component Value Date/Time    HBA1C 7.4 (A) 10/11/2022 09:14 AM      Lab Results   Component Value Date/Time    SODIUM 139 10/07/2022 10:31 AM    POTASSIUM 4.2 10/07/2022 10:31 AM    CHLORIDE 104 10/07/2022 10:31 AM    CO2 24 10/07/2022 10:31 AM    ANION 11.0 10/07/2022 10:31 AM    GLUCOSE 131 (H) 10/07/2022 10:31 AM    BUN 16 10/07/2022 10:31 AM    CREATININE 0.91 10/07/2022 10:31 AM    CALCIUM 9.3 10/07/2022 10:31 AM    ASTSGOT 19 10/07/2022 10:31 AM    ALTSGPT 20 10/07/2022 10:31 AM    TBILIRUBIN 0.3 10/07/2022 10:31 AM    ALBUMIN 4.3 10/07/2022 10:31 AM    TOTPROTEIN 7.5 10/07/2022 10:31 AM    GLOBULIN 3.2 10/07/2022 10:31 AM    AGRATIO 1.3 10/07/2022 10:31 AM       Lab Results   Component Value Date/Time    CHOLSTRLTOT 104 02/06/2021 0856    TRIGLYCERIDE 76 02/06/2021 0856    HDL 33 (A) 02/06/2021 0856    LDL 56 02/06/2021 0856       Lab Results   Component Value Date/Time    MALBCRT 429 (H) 10/07/2022 10:31 AM    MICROALBUR 36.6 10/07/2022 10:31 AM        ASSESSMENT/PLAN:   1. Type 2 diabetes mellitus without complication, without long-term current use of insulin (HCC)  Stable with an A1c of 7.4%  Discussed to stay well-hydrated  Discussed maintain a healthy diet, with minimal carbohydrate portion control  Discussed to exercise at least 150 minutes/week    Medication regimen:  Metformin 1000 mg twice a day-continue  Glargine 60 units at night-continue  Trulicity 4.5 mg weekly-continue  Farxiga 10 mg daily once    - POCT Hemoglobin A1C  - Referral to Ophthalmology  - Comp Metabolic Panel; Future  - TSH; Future  - FREE THYROXINE; Future    2. Microalbuminuria  Unstable  Continue regimen-HPI  - Lipid Profile; Future  - MICROALBUMIN CREAT RATIO URINE; Future    3. Hyperlipidemia due to type 2 diabetes mellitus (HCC)  Unstable  Continue regimen-HPI  - Lipid Profile; Future    4. Essential hypertension  Stable  Continue regimen-see HPI    5. Vitamin D deficiency  Unstable  Continue regimen-HPI  - VITAMIN D,25  HYDROXY (DEFICIENCY); Future     Disposition: Make an appointment to follow-up in 3 months  Do your blood work 2 weeks prior to next appointment    Thank you kindly for allowing me to participate in the diabetes care plan for this patient.          LEE Fox.   4/12/2023    CC:   Pcp Pt States None

## 2023-04-12 NOTE — PROGRESS NOTES
RN-CDE Note    Subjective:   Endocrinology Clinic Progress Note  PCP: Cheryl M. Demucha, A.P.R.N.    HPI:  Joey Dawson is a 57 y.o. old patient who is seen today by the Diabetes Nurse Specialist for review of his type 2 diabetes with long term use of insulin    Recent changes in health: none  DM:   Last A1c:   Lab Results   Component Value Date/Time    HBA1C 7.4 (A) 10/11/2022 09:14 AM      Previous A1c was 7.4 on 10/11/22  A1C GOAL: < 7    Diabetes Medications:   Metformin 1000 mg bid  Glargine insulin 60 units at hs  Trulicity 4.5 mg weekly  Farxiga 10 mg daily    Exercise: no regular exercise, sedentary, bowling one time a week.   Diet: states kind of healthy.   Breakfast: bowl of cereal  Lunch : sandwich  Dinner : varies.   Patient's body mass index is 38.23 kg/m². Exercise and nutrition counseling were performed at this visit.    Glucose monitoring frequency: using Olivia. (Not currently wearing)    Hypoglycemic episodes: no  Last Retinal Exam:  due  Foot Exam:  Monofilament:  current  Lab Results   Component Value Date/Time    MALBCRT 429 (H) 10/07/2022 10:31 AM    MICROALBUR 36.6 10/07/2022 10:31 AM        ACR Albumin/Creatinine Ratio goal <30     HTN:   Blood pressure goal <130/<80 at goal.   Currently Rx ACE/ARB: Yes     Dyslipidemia:    Lab Results   Component Value Date/Time    CHOLSTRLTOT 174 10/07/2022 10:31 AM     (H) 10/07/2022 10:31 AM    HDL 39 (A) 10/07/2022 10:31 AM    TRIGLYCERIDE 113 10/07/2022 10:31 AM         Currently Rx Statin: Yes     He  reports that he has never smoked. He has never used smokeless tobacco.      Plan:     Discussed and educated on:   - All medications, side effects and compliance (discussed carefully)  - Annual eye examinations at Ophthalmology  - HbA1C: target  - Home glucose monitoring emphasized  - Weight control and daily exercise    Recommended medication changes: none

## 2023-04-25 DIAGNOSIS — E11.65 UNCONTROLLED TYPE 2 DIABETES MELLITUS WITH HYPERGLYCEMIA (HCC): ICD-10-CM

## 2023-04-25 RX ORDER — DAPAGLIFLOZIN 10 MG/1
10 TABLET, FILM COATED ORAL DAILY
Qty: 90 TABLET | Refills: 1 | Status: SHIPPED | OUTPATIENT
Start: 2023-04-25 | End: 2023-10-27 | Stop reason: SDUPTHER

## 2023-04-25 RX ORDER — DAPAGLIFLOZIN 10 MG/1
10 TABLET, FILM COATED ORAL DAILY
Qty: 30 TABLET | Refills: 6 | Status: SHIPPED | OUTPATIENT
Start: 2023-04-25 | End: 2023-04-25

## 2023-04-26 ENCOUNTER — DOCUMENTATION (OUTPATIENT)
Dept: PHARMACY | Facility: MEDICAL CENTER | Age: 58
End: 2023-04-26
Payer: COMMERCIAL

## 2023-04-26 NOTE — PROGRESS NOTES
04/25/23: Spoke with [Joey]. He is doing well on the [Farxiga 10mg tabs]. He reports no side effects to the medication and no new allergies. He reports 0 missed doses and has less than a week, but enough until delivery on hand. He stated that he has stopped taking Flomax 2 weeks after starting, due to side effects. Sending delivery for [04/28] via [employer]. $15 copay with new insurance. Okay to charge update CC info (sent to pharmacy).

## 2023-04-27 ENCOUNTER — PHARMACY VISIT (OUTPATIENT)
Dept: PHARMACY | Facility: MEDICAL CENTER | Age: 58
End: 2023-04-27
Payer: COMMERCIAL

## 2023-04-27 PROCEDURE — RXMED WILLOW AMBULATORY MEDICATION CHARGE

## 2023-05-11 PROCEDURE — RXMED WILLOW AMBULATORY MEDICATION CHARGE: Performed by: INTERNAL MEDICINE

## 2023-05-12 ENCOUNTER — PHARMACY VISIT (OUTPATIENT)
Dept: PHARMACY | Facility: MEDICAL CENTER | Age: 58
End: 2023-05-12
Payer: COMMERCIAL

## 2023-05-12 ENCOUNTER — TELEPHONE (OUTPATIENT)
Dept: ENDOCRINOLOGY | Facility: MEDICAL CENTER | Age: 58
End: 2023-05-12
Payer: COMMERCIAL

## 2023-05-12 NOTE — TELEPHONE ENCOUNTER
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 Patient is going to drop it off today and pick it up on Wednesday, May 17.

## 2023-05-16 ENCOUNTER — DOCUMENTATION (OUTPATIENT)
Dept: PHARMACY | Facility: MEDICAL CENTER | Age: 58
End: 2023-05-16
Payer: COMMERCIAL

## 2023-05-16 NOTE — PROGRESS NOTES
05/15/23: Spoke with Joey. He is doing well on the FreeStyle Olivia 2 sensors. He reports no side effects to the medication and no new allergies. He reports 1 missed dose last week but will be replacing the sensor as soon as it arrives and has 0 on hand. Sending delivery for 05/15 via mail. Okay to charge $74.99 to CC on file. Patient stated he had the wrong card and will have to look for the new card in order to get $20 copays for the FreeStyle Olivia Sensors.

## 2023-06-01 ENCOUNTER — OFFICE VISIT (OUTPATIENT)
Dept: MEDICAL GROUP | Facility: PHYSICIAN GROUP | Age: 58
End: 2023-06-01
Payer: COMMERCIAL

## 2023-06-01 VITALS
BODY MASS INDEX: 39.24 KG/M2 | WEIGHT: 250 LBS | TEMPERATURE: 97 F | OXYGEN SATURATION: 95 % | HEART RATE: 70 BPM | SYSTOLIC BLOOD PRESSURE: 136 MMHG | RESPIRATION RATE: 18 BRPM | DIASTOLIC BLOOD PRESSURE: 84 MMHG | HEIGHT: 67 IN

## 2023-06-01 DIAGNOSIS — E11.9 TYPE 2 DIABETES MELLITUS WITHOUT COMPLICATION, WITHOUT LONG-TERM CURRENT USE OF INSULIN (HCC): ICD-10-CM

## 2023-06-01 DIAGNOSIS — E11.9 TYPE 2 DIABETES MELLITUS WITHOUT COMPLICATION, WITH LONG-TERM CURRENT USE OF INSULIN (HCC): ICD-10-CM

## 2023-06-01 DIAGNOSIS — I10 ESSENTIAL HYPERTENSION: ICD-10-CM

## 2023-06-01 DIAGNOSIS — Z79.4 TYPE 2 DIABETES MELLITUS WITHOUT COMPLICATION, WITH LONG-TERM CURRENT USE OF INSULIN (HCC): ICD-10-CM

## 2023-06-01 DIAGNOSIS — Z86.73 HISTORY OF CVA (CEREBROVASCULAR ACCIDENT) WITHOUT RESIDUAL DEFICITS: ICD-10-CM

## 2023-06-01 PROBLEM — I61.3 PONTINE HEMORRHAGE (HCC): Status: RESOLVED | Noted: 2020-11-09 | Resolved: 2023-06-01

## 2023-06-01 PROCEDURE — 99214 OFFICE O/P EST MOD 30 MIN: CPT | Performed by: NURSE PRACTITIONER

## 2023-06-01 PROCEDURE — 3079F DIAST BP 80-89 MM HG: CPT | Performed by: NURSE PRACTITIONER

## 2023-06-01 PROCEDURE — 3075F SYST BP GE 130 - 139MM HG: CPT | Performed by: NURSE PRACTITIONER

## 2023-06-01 ASSESSMENT — FIBROSIS 4 INDEX: FIB4 SCORE: 1.11

## 2023-06-01 NOTE — ASSESSMENT & PLAN NOTE
Patient reports having 2 strokes-one in 2013 and one in 2020 was cleared by neurology in 2021 no issues since   currently taking 81 mg aspirin 40 mg Lipitor and lisinopril 40 mg to reduce risk from comorbid conditions  Patient is a  and is required to have paperwork filled out documenting chronic conditions and status

## 2023-06-01 NOTE — PROGRESS NOTES
"Subjective:     CC:   Chief Complaint   Patient presents with    Other     dot       HPI:   Joey presents today with    History of CVA (cerebrovascular accident) without residual deficits  Patient reports having 2 strokes-one in 2013 and one in 2020 was cleared by neurology in 2021 no issues since   currently taking 81 mg aspirin 40 mg Lipitor and lisinopril 40 mg to reduce risk from comorbid conditions  Patient is a  and is required to have paperwork filled out documenting chronic conditions and status    Type 2 diabetes mellitus without complication, with long-term current use of insulin (HCC)  Patient is followed by endocrine  Has a follow-up with them in near future   most recent A1c in April was 7.4 well-controlled with 60 units Lantus 60 u qhs,   metformin 1000 mg bid,  Trulicity 4.5 mg weekly, Farxiga 10 mg daily  Patient is seen annually by eye specialist and does have mild cataracts bilateral but not symptomatic; will monitor        Essential hypertension  Well-controlled with lisinopril 40 mg daily and amlodipine 10 mg nightly  Patient will continue to monitor; goal is <130/90   if consistently above this value should return to clinic for follow-up and medication adjustment              ROS per HPI    Objective:     Exam:  /84   Pulse 70   Temp 36.1 °C (97 °F) (Temporal)   Resp 18   Ht 1.702 m (5' 7\")   Wt 113 kg (250 lb)   SpO2 95%   BMI 39.16 kg/m²  Body mass index is 39.16 kg/m².    Physical Exam:  Constitutional: Well-developed and well-nourished male Not diaphoretic. No distress.   Skin: warm, dry, intact, no evidence of rash or concerning lesions  Head: Atraumatic without lesions.  Eyes: Conjunctivae are normal. Pupils are equal, round. No scleral icterus.   Ears:  External ears unremarkable.   Neck: Supple, trachea midline. No thyromegaly present. No cervical or supraclavicular lymphadenopathy.  Cardiovascular: Regular rate and rhythm without murmur.   Pulmonary: " Clear to ausculation. Normal effort. No rales, ronchi, or wheezing.  Extremities: No cyanosis, clubbing, erythema, nor edema.   Neurological: Alert and oriented x 3.   Psychiatric:  Behavior, mood, and affect are appropriate.        Assessment & Plan:     58 y.o. male with the following -     1. History of CVA (cerebrovascular accident) without residual deficits  Will continue to monitor chronic conditions and take medications as prescribed   I recommend an  active lifestyle and to continue making healthy eating choices     2. 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    Please note that this dictation was created using voice recognition software. I have made every reasonable attempt to correct obvious errors, but I expect that there are errors of grammar and possibly content that I did not discover before finalizing the note.

## 2023-06-01 NOTE — ASSESSMENT & PLAN NOTE
Patient is followed by endocrine  Has a follow-up with them in near future   most recent A1c in April was 7.4 well-controlled with 60 units Lantus 60 u qhs,   metformin 1000 mg bid,  Trulicity 4.5 mg weekly, Farxiga 10 mg daily  Patient is seen annually by eye specialist and does have mild cataracts bilateral but not symptomatic; will monitor

## 2023-06-01 NOTE — ASSESSMENT & PLAN NOTE
Well-controlled with lisinopril 40 mg daily and amlodipine 10 mg nightly  Patient will continue to monitor; goal is <130/90   if consistently above this value should return to clinic for follow-up and medication adjustment

## 2023-06-05 PROCEDURE — RXMED WILLOW AMBULATORY MEDICATION CHARGE: Performed by: INTERNAL MEDICINE

## 2023-06-05 PROCEDURE — RXMED WILLOW AMBULATORY MEDICATION CHARGE: Performed by: NURSE PRACTITIONER

## 2023-06-06 PROCEDURE — RXMED WILLOW AMBULATORY MEDICATION CHARGE: Performed by: FAMILY MEDICINE

## 2023-06-06 RX ORDER — AMLODIPINE BESYLATE 10 MG/1
10 TABLET ORAL DAILY
Qty: 90 TABLET | Refills: 3 | Status: SHIPPED | OUTPATIENT
Start: 2023-06-06 | End: 2024-03-12 | Stop reason: SDUPTHER

## 2023-06-06 NOTE — TELEPHONE ENCOUNTER
Received request via: Pharmacy    Was the patient seen in the last year in this department? No    Does the patient have an active prescription (recently filled or refills available) for medication(s) requested? No    Does the patient have group home Plus and need 100 day supply (blood pressure, diabetes and cholesterol meds only)? Patient does not have SCP

## 2023-06-08 ENCOUNTER — PHARMACY VISIT (OUTPATIENT)
Dept: PHARMACY | Facility: MEDICAL CENTER | Age: 58
End: 2023-06-08
Payer: COMMERCIAL

## 2023-06-14 PROCEDURE — RXMED WILLOW AMBULATORY MEDICATION CHARGE: Performed by: INTERNAL MEDICINE

## 2023-06-15 ENCOUNTER — DOCUMENTATION (OUTPATIENT)
Dept: PHARMACY | Facility: MEDICAL CENTER | Age: 58
End: 2023-06-15
Payer: COMMERCIAL

## 2023-06-15 NOTE — PROGRESS NOTES
06/14/23: Spoke with Joey. He is doing well on the FreeStyle Olivia 2 Sensors. He reports no side effects to the medication and no new allergies. He reports 1-2 missed applications due to cost and has 0 doses on hand. He has about 40 days of Metformin on hand and has declined a refill at this time. Sending delivery for 06/20 via [facility]. Okay to charge $74.99 on 06/22.

## 2023-06-22 ENCOUNTER — PHARMACY VISIT (OUTPATIENT)
Dept: PHARMACY | Facility: MEDICAL CENTER | Age: 58
End: 2023-06-22
Payer: COMMERCIAL

## 2023-07-10 ENCOUNTER — TELEPHONE (OUTPATIENT)
Dept: PHARMACY | Facility: MEDICAL CENTER | Age: 58
End: 2023-07-10
Payer: COMMERCIAL

## 2023-07-10 PROCEDURE — RXMED WILLOW AMBULATORY MEDICATION CHARGE: Performed by: INTERNAL MEDICINE

## 2023-07-10 NOTE — TELEPHONE ENCOUNTER
Spoke with patient 08/16/23 asked if he was ready for us to mail out his TRULICITY 4.5 MG/0.5ML Solution Pen-injector 2Ml 28 day supply $20.00 copay, he stated he has 2 and half months worth at home, I told him i will set up the next call for 10/25/23 he said that will work     Perla Chung, Pharmacy Liaison/ RX Coordinator      Patient called clinic ask if I could please send out his refills of Freestyle Olivia 2 Sensor, Lisinopril 40 MG, Vitamin D2. I verified delivery address and credit card info I told him the arrival date will be around 07/13/23 he was ok with it       Perla Chung, Pharmacy Liasion/ RX Coordinator

## 2023-07-11 ENCOUNTER — PHARMACY VISIT (OUTPATIENT)
Dept: PHARMACY | Facility: MEDICAL CENTER | Age: 58
End: 2023-07-11
Payer: COMMERCIAL

## 2023-07-23 DIAGNOSIS — E11.65 UNCONTROLLED TYPE 2 DIABETES MELLITUS WITH HYPERGLYCEMIA (HCC): ICD-10-CM

## 2023-07-24 RX ORDER — INSULIN GLARGINE 100 [IU]/ML
INJECTION, SOLUTION SUBCUTANEOUS
Qty: 75 ML | Refills: 0 | Status: SHIPPED | OUTPATIENT
Start: 2023-07-24

## 2023-07-25 PROCEDURE — RXMED WILLOW AMBULATORY MEDICATION CHARGE

## 2023-07-26 ENCOUNTER — PHARMACY VISIT (OUTPATIENT)
Dept: PHARMACY | Facility: MEDICAL CENTER | Age: 58
End: 2023-07-26
Payer: COMMERCIAL

## 2023-07-27 ENCOUNTER — DOCUMENTATION (OUTPATIENT)
Dept: PHARMACY | Facility: MEDICAL CENTER | Age: 58
End: 2023-07-27
Payer: COMMERCIAL

## 2023-07-28 NOTE — PROGRESS NOTES
07/27/23: Joey called in regards to an unauthorized charge on his card for $60. The most recent dispense with a $60 copay is for Lantus. He confirmed that he did not request a refill of Lantus at this time as he wants to discuss a change of insulin due to cost. I spoke to Rebekah CARRINGTON and she informed me that [facility] entered the prescription to have it filled. Rebekah is unable to confirm information as to why it was filled as [facility] is unavailable. Rebekah stated that she would refund the copay of $60 and patient is to keep the Lantus. I apologized for the inconvenience and thanked him for contacting me to let me know. He thanked me and appreciated my help.

## 2023-08-01 ENCOUNTER — DOCUMENTATION (OUTPATIENT)
Dept: PHARMACY | Facility: MEDICAL CENTER | Age: 58
End: 2023-08-01
Payer: COMMERCIAL

## 2023-08-01 NOTE — PROGRESS NOTES
I SPOKE WITH RASHMI ABOUT [facility] REFILL 90DS $15. OK TO CHARGE [facility] . PATIENT STATED THEY ARE DOING WELL ON MED. NO MISSED DOSES. NO NEW SIDE EFFECTS. 5 DOSES ON HAND. NO CONCERNS FOR [facility]. THEY APPRECIATED CALL TO REFILL/FOLLOW UP   Delivery: UPS GROUND

## 2023-08-10 ENCOUNTER — TELEPHONE (OUTPATIENT)
Dept: PHARMACY | Facility: MEDICAL CENTER | Age: 58
End: 2023-08-10
Payer: COMMERCIAL

## 2023-08-10 PROCEDURE — RXMED WILLOW AMBULATORY MEDICATION CHARGE: Performed by: INTERNAL MEDICINE

## 2023-08-11 NOTE — TELEPHONE ENCOUNTER
Contact:      Phone number: [phone], Mobile    Name of person spoken with and relationship to patient: Joey Dawson, Self   Patient’s Adherence:      How patient is doing on medication: Good    How many missed doses and reason: 0    Any new medications: No    Any new conditions: No    Any new allergies: No    Any new side effects: No    Any new diagnoses: No    How many doses remainin     Did patient want to speak with pharmacist: No   Delivery:      Delivery date and method: 08/15 via USPS    Needs by Date:     Signature required: No    Any additional details for : N/A   Teach Appointment Date: 2021  Shipping Address: [address] [place] [address]  Medication(name, strength and dose): FreeStyle Olivia 2 Sensors-1 sensor q14d  Copay: $74.99  Payment Method: [facility]   Supplies: None  Additional Information: Pt. states he has enough Metformin on hand and won't need a refill for a couple more weeks. He states if he needs it sooner he will contact Perla next week. He has requested for the $74.99 copay be charged to his card on . Next call date: .

## 2023-08-13 DIAGNOSIS — E11.65 UNCONTROLLED TYPE 2 DIABETES MELLITUS WITH HYPERGLYCEMIA (HCC): ICD-10-CM

## 2023-08-15 RX ORDER — DULAGLUTIDE 4.5 MG/.5ML
INJECTION, SOLUTION SUBCUTANEOUS
Qty: 8 ML | Refills: 0 | Status: SHIPPED | OUTPATIENT
Start: 2023-08-15

## 2023-08-17 ENCOUNTER — PHARMACY VISIT (OUTPATIENT)
Dept: PHARMACY | Facility: MEDICAL CENTER | Age: 58
End: 2023-08-17
Payer: COMMERCIAL

## 2023-09-01 PROCEDURE — RXMED WILLOW AMBULATORY MEDICATION CHARGE

## 2023-09-01 PROCEDURE — RXMED WILLOW AMBULATORY MEDICATION CHARGE: Performed by: INTERNAL MEDICINE

## 2023-09-05 ENCOUNTER — TELEPHONE (OUTPATIENT)
Dept: PHARMACY | Facility: MEDICAL CENTER | Age: 58
End: 2023-09-05
Payer: COMMERCIAL

## 2023-09-05 NOTE — TELEPHONE ENCOUNTER
Contact:   RASHMI MOON   [id number]        Phone number: [phone]     Name of person spoken with and relationship to patient: SELF   Patient’s Adherence:      Medication:    METFORMIN 630/90DS $30, TRULICITY 6/84DS $25     How patient is doing on medication: WELL     How many missed doses and reason: 0     Any new medications: no     Any new conditions: no     Any new allergies: no     Any new side effects: no      Any new diagnoses: no      How many doses remaining: AT LEAST 7DS     Did patient want to speak with pharmacist: no   Delivery:             Delivery date and method:      Needs by Date:      Signature required: no     Any additional details for : ALEX Ramirez Appointment Date: NA   Shipping Address: [address] [place] [address] [place] [address]   Medication(name, strength and dose): METFORMIN 630/90DS $30, TRULICITY 6/84DS $25   Copay: 55.00   Payment Method: [facility]   Supplies: NA   Additional Information:

## 2023-09-06 ENCOUNTER — PHARMACY VISIT (OUTPATIENT)
Dept: PHARMACY | Facility: MEDICAL CENTER | Age: 58
End: 2023-09-06
Payer: COMMERCIAL

## 2023-09-12 PROCEDURE — RXMED WILLOW AMBULATORY MEDICATION CHARGE: Performed by: INTERNAL MEDICINE

## 2023-09-12 PROCEDURE — RXMED WILLOW AMBULATORY MEDICATION CHARGE: Performed by: NURSE PRACTITIONER

## 2023-09-12 PROCEDURE — RXMED WILLOW AMBULATORY MEDICATION CHARGE: Performed by: FAMILY MEDICINE

## 2023-09-14 ENCOUNTER — PHARMACY VISIT (OUTPATIENT)
Dept: PHARMACY | Facility: MEDICAL CENTER | Age: 58
End: 2023-09-14
Payer: COMMERCIAL

## 2023-09-14 DIAGNOSIS — E11.65 UNCONTROLLED TYPE 2 DIABETES MELLITUS WITH HYPERGLYCEMIA (HCC): ICD-10-CM

## 2023-09-14 PROCEDURE — RXMED WILLOW AMBULATORY MEDICATION CHARGE: Performed by: INTERNAL MEDICINE

## 2023-09-14 RX ORDER — ERGOCALCIFEROL 1.25 MG/1
50000 CAPSULE ORAL
Qty: 12 CAPSULE | Refills: 3 | Status: SHIPPED | OUTPATIENT
Start: 2023-09-14

## 2023-09-15 ENCOUNTER — PHARMACY VISIT (OUTPATIENT)
Dept: PHARMACY | Facility: MEDICAL CENTER | Age: 58
End: 2023-09-15
Payer: COMMERCIAL

## 2023-10-10 DIAGNOSIS — E78.5 HYPERLIPIDEMIA ASSOCIATED WITH TYPE 2 DIABETES MELLITUS (HCC): ICD-10-CM

## 2023-10-10 DIAGNOSIS — E11.69 HYPERLIPIDEMIA ASSOCIATED WITH TYPE 2 DIABETES MELLITUS (HCC): ICD-10-CM

## 2023-10-10 PROCEDURE — RXMED WILLOW AMBULATORY MEDICATION CHARGE: Performed by: INTERNAL MEDICINE

## 2023-10-10 RX ORDER — ATORVASTATIN CALCIUM 40 MG/1
TABLET, FILM COATED ORAL
Qty: 90 TABLET | Refills: 3 | Status: SHIPPED | OUTPATIENT
Start: 2023-10-10

## 2023-10-12 ENCOUNTER — TELEPHONE (OUTPATIENT)
Dept: PHARMACY | Facility: MEDICAL CENTER | Age: 58
End: 2023-10-12
Payer: COMMERCIAL

## 2023-10-12 ENCOUNTER — PHARMACY VISIT (OUTPATIENT)
Dept: PHARMACY | Facility: MEDICAL CENTER | Age: 58
End: 2023-10-12
Payer: COMMERCIAL

## 2023-10-13 NOTE — TELEPHONE ENCOUNTER
Contact:      Phone number: [phone], Mobile    Name of person spoken with and relationship to patient: Joey Dawson, Self   Patient’s Adherence:      How patient is doing on medication: Good    How many missed doses and reason: 0    Any new medications: No    Any new conditions: No    Any new allergies: No    Any new side effects: No    Any new diagnoses: No    How many doses remainin-Sensors    Did patient want to speak with pharmacist: No  Delivery:      Delivery date and method: Shipped on 10/12    Needs by Date:     Signature required: No    Any additional details for :  N/A  Teach Appointment Date: 2021  Shipping Address: [address] [place] [address]  Medication (name, strength and dose): Atorvastatin 40mg tabs FreeStyle Olivia 2 Sensors, Lisinopril 40mg tabs  Copay: $123.71  Payment Method: [facility]   Supplies: None  Additional Information: Pt reports multiple occurrences regarding bent needles, sensor falling off and inaccurate readings. He will be seeing his endo doctor next week and will discuss alternative sensors. He is aware that the 3 prescriptions were shipped today and should be arriving either tomorrow or Saturday. Next call date: .

## 2023-10-18 ENCOUNTER — OFFICE VISIT (OUTPATIENT)
Dept: ENDOCRINOLOGY | Facility: MEDICAL CENTER | Age: 58
End: 2023-10-18
Payer: COMMERCIAL

## 2023-10-18 VITALS
BODY MASS INDEX: 37.96 KG/M2 | WEIGHT: 250.5 LBS | HEIGHT: 68 IN | DIASTOLIC BLOOD PRESSURE: 76 MMHG | HEART RATE: 71 BPM | OXYGEN SATURATION: 91 % | SYSTOLIC BLOOD PRESSURE: 138 MMHG

## 2023-10-18 DIAGNOSIS — E55.9 VITAMIN D DEFICIENCY: ICD-10-CM

## 2023-10-18 DIAGNOSIS — I10 ESSENTIAL HYPERTENSION: ICD-10-CM

## 2023-10-18 DIAGNOSIS — R80.9 MICROALBUMINURIA: ICD-10-CM

## 2023-10-18 DIAGNOSIS — E78.5 DYSLIPIDEMIA: ICD-10-CM

## 2023-10-18 DIAGNOSIS — E11.65 UNCONTROLLED TYPE 2 DIABETES MELLITUS WITH HYPERGLYCEMIA (HCC): ICD-10-CM

## 2023-10-18 LAB
HBA1C MFR BLD: 7 % (ref ?–5.8)
POCT INT CON NEG: NEGATIVE
POCT INT CON POS: POSITIVE

## 2023-10-18 PROCEDURE — 3078F DIAST BP <80 MM HG: CPT

## 2023-10-18 PROCEDURE — 3075F SYST BP GE 130 - 139MM HG: CPT

## 2023-10-18 PROCEDURE — 83036 HEMOGLOBIN GLYCOSYLATED A1C: CPT

## 2023-10-18 PROCEDURE — 99214 OFFICE O/P EST MOD 30 MIN: CPT

## 2023-10-18 ASSESSMENT — FIBROSIS 4 INDEX: FIB4 SCORE: 1.11

## 2023-10-18 NOTE — PROGRESS NOTES
"Endocrinology Clinic Progress Note  PCP: Cheryl M. Demucha, A.P.R.N.    HPI:  Joey Dawson is a 58 y.o. old patient who is seen today by the Diabetes Nurse Specialist for review of their diabetes.   Recent changes in health: no changes.   DM:   Last A1c:   Lab Results   Component Value Date/Time    HBA1C 7.4 (A) 04/12/2023 11:29 AM      Previous A1c was 7.4 on 4/12/23  A1C GOAL: < 7    Diabetes Medications:   Metformin 1000 mg daily  Trulicity 4.5 mg weekly  Glargine 60 units per day      Exercise: sporadic irregular exercise, <half hour walking weekly  Diet: \"healthy\" diet  in general  Patient's body mass index is 38.09 kg/m². Exercise and nutrition counseling were performed at this visit.    Glucose monitoring frequency: using cgm  ( having issues with sensors falling off and does not feel that it is accurate.)     Hypoglycemic episodes: yes -   Last Retinal Exam: on file and up-to-date    Foot Exam:  Monofilament: not done      Plan:     Discussed and educated on:   - All medications, side effects and compliance (discussed carefully)  - Annual eye examinations at Ophthalmology  - HbA1C: target  - Home glucose monitoring emphasized  - Weight control and daily exercise  - samples of Olivia 3 sensors provided to see if he likes them, discussed obtaining sensor overlay patch from [employer] to help keep them on.   Recommended medication changes: no changes     "

## 2023-10-18 NOTE — PROGRESS NOTES
CHIEF COMPLAINT: Patient is here for follow up of   HPI:   Joey Dawson is a 57 y.o. male       Type 2 diabetes mellitus with hyperglycemia:      Latest Reference Range & Units 10/11/22 09:14 04/12/23 11:29 10/18/23 15:22   Glycohemoglobin 5.8 % 7.4 ! 7.4 ! 7.0 !       Medication regimen:  Metformin 1000 mg daily   Glargine 60 units at night  Trulicity 4.5 mg weekly  Farxiga 10 mg daily            Denies hypoglycemia     Diabetes Complications   Retinopathy: No known retinopathy.    Last eye exam: -Dr Thomas   Referred to ophthalmology on last appointment-[facility]     Neuropathy: Denies paresthesias or numbness in hands or feet.   Denies any foot wounds.  Hx of stroke Nov 2021    Exercise: Minimal, will begin next month - pt is not exercising as much   Diet: Better, less fast food as he used to.     2.  Microalbuminuria:  Currently taking lisinopril 40 mg daily  Blood pressure today at 138/76   Latest Reference Range & Units 10/07/22 10:31   Micro Alb Creat Ratio 0 - 30 mg/g 429 (H)   Creatinine, Urine mg/dL 85.26   Microalbumin, Urine Random mg/dL 36.6       3.Dyslipidemia:  He has hyperlipidemia and is on atorvastatin 40 mg daily   Latest Reference Range & Units 10/07/22 10:31   Cholesterol,Tot 100 - 199 mg/dL 174   Triglycerides 0 - 149 mg/dL 113   HDL >=40 mg/dL 39 !   LDL <100 mg/dL 112 (H)     4.  Essential hypertension:  Currently taking lisinopril 40 mg daily, Amlodipine 10 mg daily,   BP at 138/76    5.  Vitamin D deficiency:  Daily weekly 99852 IUs weekly   Latest Reference Range & Units 01/13/23 14:40   25-Hydroxy   Vitamin D 25 30 - 100 ng/mL 39         Patient's medications, allergies, and social histories were reviewed and updated as appropriate.    ROS:     CONS:     No fever, no chills   EYES:     No diplopia, no blurry vision   CV:           No chest pain, no palpitations   PULM:     No SOB, no cough, no hemoptysis.   GI:            No nausea, no vomiting, no diarrhea, no  "constipation   ENDO:     No polyuria, no polydipsia, no heat intolerance, no cold intolerance       Past Medical History:  Problem List:  2023-06: Type 2 diabetes mellitus without complication, with long-  term current use of insulin ([facility])  2023-01: Healthcare maintenance  2022-06: Hyperlipidemia associated with type 2 diabetes mellitus ([facility])  2021-09: Diabetes mellitus ([facility])  2020-12: Essential hypertension  2020-12: Thyroid nodule  2020-11: DESTIN (obstructive sleep apnea)  2020-11: Acute hemorrhagic CVA (Pontine hemorrhage) ([facility])  2020-11: Elevated troponin  2020-10: Situational depression  2020-10: Trochanteric bursitis of left hip  2019-05: Soft tissue mass  2019-05: Obesity (BMI 30-39.9)  2015-06: ED (erectile dysfunction)  2015-04: Vitamin D deficiency  2015-04: RBC microcytosis  2015-04: Microalbuminuria  2015-04: Uncontrolled type 2 diabetes mellitus with hyperglycemia   ([facility])  2015-04: Hypertensive emergency  2015-04: Sleep apnea  2015-04: Mixed hyperlipidemia  2015-04: History of CVA (cerebrovascular accident) without residual   deficits      Past Surgical History:  No past surgical history on file.     Allergies:  Food     Social History:  Social History     Tobacco Use    Smoking status: Never    Smokeless tobacco: Never   Vaping Use    Vaping Use: Never used   Substance Use Topics    Alcohol use: Yes     Alcohol/week: 1.2 oz     Types: 2 Cans of beer per week     Comment: yearly    Drug use: No        Family History:   family history includes Cancer in his mother; Heart Disease in his father; No Known Problems in his sister.      PHYSICAL EXAM:   OBJECTIVE:  Vital signs: Pulse 71   Ht 1.727 m (5' 8\")   Wt 114 kg (250 lb 8 oz)   SpO2 91%   BMI 38.09 kg/m²   GENERAL: Well-developed, well-nourished in no apparent distress.   SKIN: No rashes, lesions.     Labs:  Lab Results   Component Value Date/Time    HBA1C 7.4 (A) 04/12/2023 11:29 AM      Lab Results   Component Value Date/Time    SODIUM 139 " 10/07/2022 10:31 AM    POTASSIUM 4.2 10/07/2022 10:31 AM    CHLORIDE 104 10/07/2022 10:31 AM    CO2 24 10/07/2022 10:31 AM    ANION 11.0 10/07/2022 10:31 AM    GLUCOSE 131 (H) 10/07/2022 10:31 AM    BUN 16 10/07/2022 10:31 AM    CREATININE 0.91 10/07/2022 10:31 AM    CALCIUM 9.3 10/07/2022 10:31 AM    ASTSGOT 19 10/07/2022 10:31 AM    ALTSGPT 20 10/07/2022 10:31 AM    TBILIRUBIN 0.3 10/07/2022 10:31 AM    ALBUMIN 4.3 10/07/2022 10:31 AM    TOTPROTEIN 7.5 10/07/2022 10:31 AM    GLOBULIN 3.2 10/07/2022 10:31 AM    AGRATIO 1.3 10/07/2022 10:31 AM       Lab Results   Component Value Date/Time    CHOLSTRLTOT 104 02/06/2021 0856    TRIGLYCERIDE 76 02/06/2021 0856    HDL 33 (A) 02/06/2021 0856    LDL 56 02/06/2021 0856       Lab Results   Component Value Date/Time    MALBCRT 429 (H) 10/07/2022 10:31 AM    MICROALBUR 36.6 10/07/2022 10:31 AM        ASSESSMENT/PLAN:   1. Uncontrolled type 2 diabetes mellitus with hyperglycemia (HCC)  Stable with a1c at 7%  - Exercise for least 150 minutes/week  - Stable hydration  - Daily feet check  - Maintain a healthy diet, minimal carbohydrate, portion control   -message sent to pharmacy techs to see which CGM to send for real time data (libre3 or Dexcom), and which GLP1 to order (ozempic or Moujaro)     Medication regimen:  Metformin 1000 mg daily -Continue   Glargine 60 units at night -Continue   Trulicity 4.5 mg weekly-will be changed to Ozempic or Mounjaro depending -see above   Farxiga 10 mg daily -Continue     - POCT Hemoglobin A1C  - Comp Metabolic Panel; Future  - TSH; Future  - FREE THYROXINE; Future    2. Microalbuminuria  Unstable  Continue regimen-HPI   Pt did not do blood work prior to this appt   - MICROALBUMIN CREAT RATIO URINE; Future    3. Dyslipidemia  Unstable  Continue regimen-HPI   Pt did not do blood work prior to this appt   - Lipid Profile; Future    4. Essential hypertension  Unstable  Continue regimen-HPI   Pt did not do blood work prior to this appt     5.  Vitamin D deficiency  - Stable  -Continue regimen-see HPI   Pt did not do blood work prior to this appt   - VITAMIN D,25 HYDROXY (DEFICIENCY); Future       Disposition: Make an appointment to follow-up in Dec 14th   Do your blood work 2 weeks prior to next appointment    Thank you kindly for allowing me to participate in the diabetes care plan for this patient.          Pérez Rowe A.P.R.N.   10/18/23      CC:   Pcp Pt States None

## 2023-10-19 DIAGNOSIS — E11.65 UNCONTROLLED TYPE 2 DIABETES MELLITUS WITH HYPERGLYCEMIA (HCC): ICD-10-CM

## 2023-10-19 RX ORDER — TIRZEPATIDE 2.5 MG/.5ML
2.5 INJECTION, SOLUTION SUBCUTANEOUS
Qty: 6 ML | Refills: 11 | Status: SHIPPED | OUTPATIENT
Start: 2023-10-19 | End: 2023-12-14

## 2023-10-20 ENCOUNTER — TELEPHONE (OUTPATIENT)
Dept: ENDOCRINOLOGY | Facility: MEDICAL CENTER | Age: 58
End: 2023-10-20
Payer: COMMERCIAL

## 2023-10-20 NOTE — TELEPHONE ENCOUNTER
Received  PA request via [employer] for Mounjaro 2.5MG/0.5ML pen-injectors.    (Quantity:6mls, Day Supply:84-$25)  OLD Copay: $60.00, NEW Copay:  $25.00, Voucher Amount Applied: $35.00,    (Quantity:2mls, Day Supply:28-$20)    Ran Test claim via [employer]    Insurance: [facility]    Pharmacy on File  [facility]     Is patient eligible to [facility]? Yes-already fills    FA-Voucher applied

## 2023-10-24 ENCOUNTER — HOSPITAL ENCOUNTER (OUTPATIENT)
Dept: LAB | Facility: MEDICAL CENTER | Age: 58
End: 2023-10-24
Payer: COMMERCIAL

## 2023-10-24 DIAGNOSIS — R80.9 MICROALBUMINURIA: ICD-10-CM

## 2023-10-24 DIAGNOSIS — E11.69 HYPERLIPIDEMIA DUE TO TYPE 2 DIABETES MELLITUS (HCC): ICD-10-CM

## 2023-10-24 DIAGNOSIS — E11.9 TYPE 2 DIABETES MELLITUS WITHOUT COMPLICATION, WITHOUT LONG-TERM CURRENT USE OF INSULIN (HCC): ICD-10-CM

## 2023-10-24 DIAGNOSIS — E78.5 HYPERLIPIDEMIA DUE TO TYPE 2 DIABETES MELLITUS (HCC): ICD-10-CM

## 2023-10-24 DIAGNOSIS — E55.9 VITAMIN D DEFICIENCY: ICD-10-CM

## 2023-10-24 LAB
25(OH)D3 SERPL-MCNC: 53 NG/ML (ref 30–100)
ALBUMIN SERPL BCP-MCNC: 4.3 G/DL (ref 3.2–4.9)
ALBUMIN/GLOB SERPL: 1.4 G/DL
ALP SERPL-CCNC: 116 U/L (ref 30–99)
ALT SERPL-CCNC: 18 U/L (ref 2–50)
ANION GAP SERPL CALC-SCNC: 9 MMOL/L (ref 7–16)
AST SERPL-CCNC: 12 U/L (ref 12–45)
BILIRUB SERPL-MCNC: 0.4 MG/DL (ref 0.1–1.5)
BUN SERPL-MCNC: 17 MG/DL (ref 8–22)
CALCIUM ALBUM COR SERPL-MCNC: 8.8 MG/DL (ref 8.5–10.5)
CALCIUM SERPL-MCNC: 9 MG/DL (ref 8.5–10.5)
CHLORIDE SERPL-SCNC: 103 MMOL/L (ref 96–112)
CHOLEST SERPL-MCNC: 124 MG/DL (ref 100–199)
CO2 SERPL-SCNC: 27 MMOL/L (ref 20–33)
CREAT SERPL-MCNC: 0.9 MG/DL (ref 0.5–1.4)
CREAT UR-MCNC: 101.71 MG/DL
FASTING STATUS PATIENT QL REPORTED: NORMAL
GFR SERPLBLD CREATININE-BSD FMLA CKD-EPI: 98 ML/MIN/1.73 M 2
GLOBULIN SER CALC-MCNC: 3 G/DL (ref 1.9–3.5)
GLUCOSE SERPL-MCNC: 95 MG/DL (ref 65–99)
HDLC SERPL-MCNC: 35 MG/DL
LDLC SERPL CALC-MCNC: 76 MG/DL
MICROALBUMIN UR-MCNC: 52.4 MG/DL
MICROALBUMIN/CREAT UR: 515 MG/G (ref 0–30)
POTASSIUM SERPL-SCNC: 4.1 MMOL/L (ref 3.6–5.5)
PROT SERPL-MCNC: 7.3 G/DL (ref 6–8.2)
SODIUM SERPL-SCNC: 139 MMOL/L (ref 135–145)
T4 FREE SERPL-MCNC: 1.11 NG/DL (ref 0.93–1.7)
TRIGL SERPL-MCNC: 65 MG/DL (ref 0–149)
TSH SERPL DL<=0.005 MIU/L-ACNC: 0.4 UIU/ML (ref 0.38–5.33)

## 2023-10-24 PROCEDURE — 82043 UR ALBUMIN QUANTITATIVE: CPT

## 2023-10-24 PROCEDURE — 80053 COMPREHEN METABOLIC PANEL: CPT

## 2023-10-24 PROCEDURE — 82570 ASSAY OF URINE CREATININE: CPT

## 2023-10-24 PROCEDURE — 84443 ASSAY THYROID STIM HORMONE: CPT

## 2023-10-24 PROCEDURE — 82306 VITAMIN D 25 HYDROXY: CPT

## 2023-10-24 PROCEDURE — 36415 COLL VENOUS BLD VENIPUNCTURE: CPT

## 2023-10-24 PROCEDURE — 84439 ASSAY OF FREE THYROXINE: CPT

## 2023-10-24 PROCEDURE — 80061 LIPID PANEL: CPT

## 2023-10-27 DIAGNOSIS — I10 ESSENTIAL HYPERTENSION: ICD-10-CM

## 2023-10-27 DIAGNOSIS — E11.65 UNCONTROLLED TYPE 2 DIABETES MELLITUS WITH HYPERGLYCEMIA (HCC): ICD-10-CM

## 2023-10-30 ENCOUNTER — TELEPHONE (OUTPATIENT)
Dept: ENDOCRINOLOGY | Facility: MEDICAL CENTER | Age: 58
End: 2023-10-30
Payer: COMMERCIAL

## 2023-10-30 ENCOUNTER — PHARMACY VISIT (OUTPATIENT)
Dept: PHARMACY | Facility: MEDICAL CENTER | Age: 58
End: 2023-10-30
Payer: COMMERCIAL

## 2023-10-30 PROCEDURE — RXMED WILLOW AMBULATORY MEDICATION CHARGE

## 2023-10-30 RX ORDER — DAPAGLIFLOZIN 10 MG/1
10 TABLET, FILM COATED ORAL DAILY
Qty: 90 TABLET | Refills: 1 | Status: SHIPPED | OUTPATIENT
Start: 2023-10-30 | End: 2024-03-12 | Stop reason: SDUPTHER

## 2023-10-30 RX ORDER — LISINOPRIL 40 MG/1
TABLET ORAL
Qty: 90 TABLET | Refills: 3 | Status: SHIPPED | OUTPATIENT
Start: 2023-10-30

## 2023-10-30 NOTE — TELEPHONE ENCOUNTER
Received PA request via [employer] for Farxiga 10mg tablets.    $15-90t/90D  $15-30t/30D    Insurance:[facility]    Pharmacy on File  [facility]     Is patient eligible to fill with [facility]? Yes-already fills     FA-Vouchers applied

## 2023-11-02 ENCOUNTER — TELEPHONE (OUTPATIENT)
Dept: ENDOCRINOLOGY | Facility: MEDICAL CENTER | Age: 58
End: 2023-11-02
Payer: COMMERCIAL

## 2023-11-02 ENCOUNTER — TELEPHONE (OUTPATIENT)
Dept: PHARMACY | Facility: MEDICAL CENTER | Age: 58
End: 2023-11-02
Payer: COMMERCIAL

## 2023-11-02 NOTE — TELEPHONE ENCOUNTER
Left voicemail in regards to CGM order through [employer] that is pending patient 's call: [phone].

## 2023-11-03 NOTE — TELEPHONE ENCOUNTER
Contact:      Phone number: [phone], Mobile    Name of person spoken with and relationship to patient: Joey Dawson, Self   Patient’s Adherence:      How patient is doing on medication: Good    How many missed doses and reason: 0    Any new medications: No    Any new conditions: No    Any new allergies: No    Any new side effects: No    Any new diagnoses: No    How many doses remaininds    Did patient want to speak with pharmacist: No  Delivery:      Delivery date and method: 10/31 via [email or web address]    Needs by Date:     Signature required: No    Any additional details for : N/A   Teach Appointment Date: 21  Shipping Address: [address] [place] [address] [place] [address]  Medication (name, strength and dose): Farxiga 10mg tabs  Copay: $15/90ds  Payment Method: [facility]   Supplies: None  Additional Information: Pt was shipped both Farxiga and FreeStyle Olivia 2 Sensors on 10/31 without delivery slip or consent to copay charge. After discussing solutions with Rebekah, she states that they can refund the money if the patient returns the sensors. He has agreed to return the sensors in person tomorrow,  for a full refund. He declined a refill of the sensors and his prescriber gave him FreeStyle Olivia 3 sensors to trial. Next call date: -Sensors to F/U how trial is going & 24-Farxiga.

## 2023-11-15 ENCOUNTER — TELEPHONE (OUTPATIENT)
Dept: ENDOCRINOLOGY | Facility: MEDICAL CENTER | Age: 58
End: 2023-11-15
Payer: COMMERCIAL

## 2023-11-22 ENCOUNTER — TELEPHONE (OUTPATIENT)
Dept: PHARMACY | Facility: MEDICAL CENTER | Age: 58
End: 2023-11-22
Payer: COMMERCIAL

## 2023-11-22 PROCEDURE — RXMED WILLOW AMBULATORY MEDICATION CHARGE: Performed by: INTERNAL MEDICINE

## 2023-11-23 NOTE — TELEPHONE ENCOUNTER
Contact:      Phone number: [phone], Mobile    Name of person spoken with and relationship to patient: Joey Dawson, Self   Patient’s Adherence:      How patient is doing on medication: Good    How many missed doses and reason: 0    Any new medications: No    Any new conditions: No    Any new allergies: No    Any new side effects: No    Any new diagnoses: No    How many doses remainin-Sensors, about 1 month of Trulicity    Did patient want to speak with pharmacist: No  Delivery:      Delivery date and method:  via [employer]    Needs by Date:     Signature required: No    Any additional details for : N/A   Teach Appointment Date: 21  Shipping Address: [address] [place] [address]  Medication (name, strength and dose): FreeStyle Olivia 2 Sensors  Copay: $69.99/28ds  Payment Method: [facility]  Supplies: None  Additional Information: Pt states that he recently trialed the FreeStyle Olivia 3 Sensors but he prefers to switch back to the FreeStyle Olivia 2 Sensors. He has declined a refill of Trulicity at this time as he has about a month on hand. He states that he will starting Ozempic after finishing Trulicity. Next call date: .

## 2023-11-26 ENCOUNTER — PHARMACY VISIT (OUTPATIENT)
Dept: PHARMACY | Facility: MEDICAL CENTER | Age: 58
End: 2023-11-26
Payer: COMMERCIAL

## 2023-12-12 ENCOUNTER — PHARMACY VISIT (OUTPATIENT)
Dept: PHARMACY | Facility: MEDICAL CENTER | Age: 58
End: 2023-12-12
Payer: COMMERCIAL

## 2023-12-12 PROCEDURE — RXMED WILLOW AMBULATORY MEDICATION CHARGE: Performed by: FAMILY MEDICINE

## 2023-12-12 PROCEDURE — RXMED WILLOW AMBULATORY MEDICATION CHARGE: Performed by: NURSE PRACTITIONER

## 2023-12-12 PROCEDURE — RXMED WILLOW AMBULATORY MEDICATION CHARGE: Performed by: INTERNAL MEDICINE

## 2023-12-13 ENCOUNTER — TELEPHONE (OUTPATIENT)
Dept: PHARMACY | Facility: MEDICAL CENTER | Age: 58
End: 2023-12-13
Payer: COMMERCIAL

## 2023-12-13 NOTE — TELEPHONE ENCOUNTER
Contact:             Phone number: [phone]     Name of person spoken with and relationship to patient: RASHMI MOON   Medication:   Patient’s Adherence:             How patient is doing on medication: well     How many missed doses and reason: 0     Any new medications: no     Any new conditions: no     Any new allergies: no     Any new side effects: no      Any new diagnoses: no      How many doses remainin DS NORVASC, ZOLOFT, VIT D. 1 JONES SENSOR     Did patient want to speak with pharmacist: no   Delivery:             Delivery date and method: USPS SHIP      Needs by Date:      Signature required: no     Any additional details for : ALEX   Teach Appointment Date: NA   Shipping Address: [address] [place] [address] [place] [address]   Medication(name, strength and dose): AMLODIPINE 90DS $14.64, SERTRALINE 90DS $20.37, VIT D DS $16.84   Copay: $51.85   Payment Method: [facility]   Supplies:    Additional Information:   PT HAD MD APPT NEXT WEEK AND MAY CHANGE TRULICITY . ALSO WONT NEED SENSOR FOR ANOTHER 2 WEEKS.

## 2023-12-14 ENCOUNTER — OFFICE VISIT (OUTPATIENT)
Dept: ENDOCRINOLOGY | Facility: MEDICAL CENTER | Age: 58
End: 2023-12-14
Payer: COMMERCIAL

## 2023-12-14 VITALS
SYSTOLIC BLOOD PRESSURE: 126 MMHG | HEIGHT: 67 IN | BODY MASS INDEX: 39.6 KG/M2 | HEART RATE: 93 BPM | WEIGHT: 252.3 LBS | OXYGEN SATURATION: 79 % | DIASTOLIC BLOOD PRESSURE: 60 MMHG

## 2023-12-14 DIAGNOSIS — E78.5 DYSLIPIDEMIA: ICD-10-CM

## 2023-12-14 DIAGNOSIS — I10 ESSENTIAL HYPERTENSION: ICD-10-CM

## 2023-12-14 DIAGNOSIS — E11.65 UNCONTROLLED TYPE 2 DIABETES MELLITUS WITH HYPERGLYCEMIA (HCC): ICD-10-CM

## 2023-12-14 DIAGNOSIS — E55.9 VITAMIN D DEFICIENCY: ICD-10-CM

## 2023-12-14 DIAGNOSIS — R80.9 MICROALBUMINURIA: ICD-10-CM

## 2023-12-14 PROCEDURE — 3074F SYST BP LT 130 MM HG: CPT

## 2023-12-14 PROCEDURE — 99214 OFFICE O/P EST MOD 30 MIN: CPT

## 2023-12-14 PROCEDURE — 3078F DIAST BP <80 MM HG: CPT

## 2023-12-14 PROCEDURE — 99212 OFFICE O/P EST SF 10 MIN: CPT

## 2023-12-14 RX ORDER — FINERENONE 10 MG/1
10 TABLET, FILM COATED ORAL DAILY
Qty: 90 TABLET | Refills: 4 | Status: SHIPPED | OUTPATIENT
Start: 2023-12-14

## 2023-12-14 RX ORDER — TIRZEPATIDE 2.5 MG/.5ML
2.5 INJECTION, SOLUTION SUBCUTANEOUS
Qty: 6 ML | Refills: 11 | Status: SHIPPED | OUTPATIENT
Start: 2023-12-14

## 2023-12-14 ASSESSMENT — FIBROSIS 4 INDEX: FIB4 SCORE: 0.74

## 2023-12-14 NOTE — PROGRESS NOTES
CHIEF COMPLAINT: Patient is here for follow up of   HPI:   Joey Dawson is a 57 y.o. male       Type 2 diabetes mellitus with hyperglycemia:      Latest Reference Range & Units 10/11/22 09:14 04/12/23 11:29 10/18/23 15:22   Glycohemoglobin 5.8 % 7.4 ! 7.4 ! 7.0 !       Medication regimen:  Metformin 1000 mg daily   Glargine 60 units at night  Trulicity 4.5 mg weekly-Mounjaro?  Farxiga 10 mg daily    Olivia 3 too expensive so he has the Olivia 2 now.         Denies hypoglycemia     Diabetes Complications   Retinopathy: No known retinopathy.    Last eye exam: -Dr Thomas   Referred to ophthalmology on last appointment-[facility]     Neuropathy: Denies paresthesias or numbness in hands or feet.   Denies any foot wounds.  Hx of stroke Nov 2021    Exercise: Minimal, will begin next month - pt is not exercising as much   Diet: Better, less fast food as he used to.      Latest Reference Range & Units 10/24/23 14:29   TSH 0.380 - 5.330 uIU/mL 0.400   Free T-4 0.93 - 1.70 ng/dL 1.11       2.  Microalbuminuria:  Currently taking lisinopril 40 mg daily  Blood pressure today at 126/60   Latest Reference Range & Units 10/24/23 14:29   Micro Alb Creat Ratio 0 - 30 mg/g 515 (H)   Creatinine, Urine mg/dL 101.71   Microalbumin, Urine Random mg/dL 52.4      Latest Reference Range & Units 10/24/23 14:29   Potassium 3.6 - 5.5 mmol/L 4.1      Latest Reference Range & Units 10/24/23 14:29   Bun 8 - 22 mg/dL 17   Creatinine 0.50 - 1.40 mg/dL 0.90   GFR (CKD-EPI) >60 mL/min/1.73 m 2 98     3.Dyslipidemia:  He has hyperlipidemia and is on atorvastatin 40 mg daily   Latest Reference Range & Units 10/24/23 14:29   Cholesterol,Tot 100 - 199 mg/dL 124   Triglycerides 0 - 149 mg/dL 65   HDL >=40 mg/dL 35 !   LDL <100 mg/dL 76       4.  Essential hypertension:  Currently taking lisinopril 40 mg daily, Amlodipine 10 mg daily,   BP at 126/60    5.  Vitamin D deficiency:  Daily weekly 86372 IUs weekly   Latest Reference Range & Units  01/13/23 14:40   25-Hydroxy   Vitamin D 25 30 - 100 ng/mL 39         Patient's medications, allergies, and social histories were reviewed and updated as appropriate.    ROS:     CONS:     No fever, no chills   EYES:     No diplopia, no blurry vision   CV:           No chest pain, no palpitations   PULM:     No SOB, no cough, no hemoptysis.   GI:            No nausea, no vomiting, no diarrhea, no constipation   ENDO:     No polyuria, no polydipsia, no heat intolerance, no cold intolerance       Past Medical History:  Problem List:  2023-06: Type 2 diabetes mellitus without complication, with long-  term current use of insulin ([facility])  2023-01: Healthcare maintenance  2022-06: Hyperlipidemia associated with type 2 diabetes mellitus ([facility])  2021-09: Diabetes mellitus ([facility])  2020-12: Essential hypertension  2020-12: Thyroid nodule  2020-11: DESTIN (obstructive sleep apnea)  2020-11: Acute hemorrhagic CVA (Pontine hemorrhage) ([facility])  2020-11: Elevated troponin  2020-10: Situational depression  2020-10: Trochanteric bursitis of left hip  2019-05: Soft tissue mass  2019-05: Obesity (BMI 30-39.9)  2015-06: ED (erectile dysfunction)  2015-04: Vitamin D deficiency  2015-04: RBC microcytosis  2015-04: Microalbuminuria  2015-04: Uncontrolled type 2 diabetes mellitus with hyperglycemia   ([facility])  2015-04: Hypertensive emergency  2015-04: Sleep apnea  2015-04: Mixed hyperlipidemia  2015-04: History of CVA (cerebrovascular accident) without residual   deficits      Past Surgical History:  No past surgical history on file.     Allergies:  Food     Social History:  Social History     Tobacco Use    Smoking status: Never    Smokeless tobacco: Never   Vaping Use    Vaping Use: Never used   Substance Use Topics    Alcohol use: Yes     Alcohol/week: 1.2 oz     Types: 2 Cans of beer per week     Comment: yearly    Drug use: No        Family History:   family history includes Cancer in his mother; Heart Disease in his father; No Known Problems in  "his sister.      PHYSICAL EXAM:   OBJECTIVE:  Vital signs: /60 (BP Location: Left arm, Patient Position: Sitting, BP Cuff Size: Large adult)   Pulse 93   Ht 1.702 m (5' 7\")   Wt 114 kg (252 lb 4.8 oz)   SpO2 (!) 79%   BMI 39.52 kg/m²   GENERAL: Well-developed, well-nourished in no apparent distress.   SKIN: No rashes, lesions.     Labs:  Lab Results   Component Value Date/Time    HBA1C 7.0 (A) 10/18/2023 03:22 PM      Lab Results   Component Value Date/Time    SODIUM 139 10/24/2023 02:29 PM    POTASSIUM 4.1 10/24/2023 02:29 PM    CHLORIDE 103 10/24/2023 02:29 PM    CO2 27 10/24/2023 02:29 PM    ANION 9.0 10/24/2023 02:29 PM    GLUCOSE 95 10/24/2023 02:29 PM    BUN 17 10/24/2023 02:29 PM    CREATININE 0.90 10/24/2023 02:29 PM    CALCIUM 9.0 10/24/2023 02:29 PM    ASTSGOT 12 10/24/2023 02:29 PM    ALTSGPT 18 10/24/2023 02:29 PM    TBILIRUBIN 0.4 10/24/2023 02:29 PM    ALBUMIN 4.3 10/24/2023 02:29 PM    TOTPROTEIN 7.3 10/24/2023 02:29 PM    GLOBULIN 3.0 10/24/2023 02:29 PM    AGRATIO 1.4 10/24/2023 02:29 PM       Lab Results   Component Value Date/Time    CHOLSTRLTOT 104 02/06/2021 0856    TRIGLYCERIDE 76 02/06/2021 0856    HDL 33 (A) 02/06/2021 0856    LDL 56 02/06/2021 0856       Lab Results   Component Value Date/Time    MALBCRT 515 (H) 10/24/2023 02:29 PM    MICROALBUR 52.4 10/24/2023 02:29 PM        ASSESSMENT/PLAN:   1. Uncontrolled type 2 diabetes mellitus with hyperglycemia (HCC)  Stable with a1c at 7%  - Exercise for least 150 minutes/week  - Stable hydration  - Daily feet check  - Maintain a healthy diet, minimal carbohydrate, portion control     Medication regimen:  Metformin 1000 mg daily -Continue   Glargine 60 units at night -Continue   Trulicity 4.5 mg weekly-patient will finish last 3 weeks of trulicity, then he will have start taking Mounjaro 2.5mg weekly, side effects discussed   Farxiga 10 mg daily -Continue     - Tirzepatide (MOUNJARO) 2.5 MG/0.5ML Solution Pen-injector; Inject 2.5 mg " under the skin every 7 days.  Dispense: 6 mL; Refill: 11    2. Microalbuminuria  Unstable  Continue regimen-HPI   Added Kerendia 10 mg daily   - MICROALBUMIN CREAT RATIO URINE; Future  - Comp Metabolic Panel; Future  - Finerenone (KERENDIA) 10 MG Tab; Take 10 mg by mouth every day.  Dispense: 90 Tablet; Refill: 4    3. Dyslipidemia  Unstable  Continue regimen-HPI   Pt did not do blood work prior to this appt   - Lipid Profile; Future    4. Essential hypertension  Unstable  Continue regimen-HPI     5. Vitamin D deficiency  - Stable  -Continue regimen-see HPI       Disposition: Make an appointment to follow-up in 2 months   Do your blood work 2 weeks prior to next appointment    Thank you kindly for allowing me to participate in the diabetes care plan for this patient.      ANASTACIA Fox.P.R.N.   12/14/23      CC:   Pcp Pt States None

## 2023-12-15 ENCOUNTER — TELEPHONE (OUTPATIENT)
Dept: ENDOCRINOLOGY | Facility: MEDICAL CENTER | Age: 58
End: 2023-12-15
Payer: COMMERCIAL

## 2023-12-15 DIAGNOSIS — E11.69 TYPE 2 DIABETES MELLITUS WITH OTHER SPECIFIED COMPLICATION, WITH LONG-TERM CURRENT USE OF INSULIN (HCC): ICD-10-CM

## 2023-12-15 DIAGNOSIS — Z79.4 TYPE 2 DIABETES MELLITUS WITH OTHER SPECIFIED COMPLICATION, WITH LONG-TERM CURRENT USE OF INSULIN (HCC): ICD-10-CM

## 2023-12-15 NOTE — TELEPHONE ENCOUNTER
Prior Authorization for Kerendia 10 mg tablets (Quantity: 90, Days: 90) has been submitted via Cover My Meds: Key ([id number])    Insurance: [employer]    Will follow up in 24-48 business hours.

## 2023-12-15 NOTE — TELEPHONE ENCOUNTER
Prior Authorization for Kerendia 10mg tablets   has been approved for a quantity of 90 , day supply 90    Insurance-[facility]    Prior Authorization Reference#- [id number]    Dates in effect, from 12/15/23 through 12/15/24    Co-pay: $120/90T/90D                $40/30T/30D    Pharmacy and phone number   [facility] - [place], [place] - [id number] [facility], [facility] [address]  Phone: [phone]  Fax: [phone]    Is patient eligible to fill with [facility]? Yes    FA-Co-Pay card eligible    Next Steps:  Routing Approval to Liaisons and sending to outreach.

## 2023-12-18 PROCEDURE — RXMED WILLOW AMBULATORY MEDICATION CHARGE

## 2023-12-19 RX ORDER — METFORMIN HYDROCHLORIDE 500 MG/1
1000 TABLET, EXTENDED RELEASE ORAL 2 TIMES DAILY
Qty: 360 TABLET | Refills: 3 | Status: SHIPPED | OUTPATIENT
Start: 2023-12-19

## 2023-12-20 ENCOUNTER — TELEPHONE (OUTPATIENT)
Dept: PHARMACY | Facility: MEDICAL CENTER | Age: 58
End: 2023-12-20
Payer: COMMERCIAL

## 2023-12-20 ENCOUNTER — PHARMACY VISIT (OUTPATIENT)
Dept: PHARMACY | Facility: MEDICAL CENTER | Age: 58
End: 2023-12-20
Payer: COMMERCIAL

## 2023-12-20 PROCEDURE — RXMED WILLOW AMBULATORY MEDICATION CHARGE

## 2023-12-20 PROCEDURE — RXMED WILLOW AMBULATORY MEDICATION CHARGE: Performed by: INTERNAL MEDICINE

## 2023-12-21 NOTE — TELEPHONE ENCOUNTER
Contact:      Phone number: [phone], Mobile    Name of person spoken with and relationship to patient: Joey Dawson, Self   Patient’s Adherence:      How patient is doing on medication: Good    How many missed doses and reason: 0    Any new medications: Yes, Mounjaro    Any new conditions: No    Any new allergies: No    Any new side effects: No    Any new diagnoses: No    How many doses remainin-Sensors, 2 dose-Trulicity    Did patient want to speak with pharmacist: No  Delivery:      Delivery date and method:  via USPS    Needs by Date:     Signature required: No    Any additional details for : N/A   Teach Appointment Date: 21  Shipping Address: [address] [place] [id number]  Medication (name, strength and dose): FreeStyle Olivia 2 Sensors, Mounjaro 2.5mg/0.5mL  Copay: $99.99  Payment Method: CCOF  Supplies: None  Additional Information: Starting Mounjaro to replace the Trulicity. Pt will use the remaining Trulicity on hand prior to starting Mounjaro. Next call date: -Sensors, -Mounjaro.

## 2023-12-27 ENCOUNTER — PHARMACY VISIT (OUTPATIENT)
Dept: PHARMACY | Facility: MEDICAL CENTER | Age: 58
End: 2023-12-27
Payer: COMMERCIAL

## 2024-01-19 ENCOUNTER — TELEPHONE (OUTPATIENT)
Dept: PHARMACY | Facility: MEDICAL CENTER | Age: 59
End: 2024-01-19
Payer: COMMERCIAL

## 2024-01-19 NOTE — TELEPHONE ENCOUNTER
Spoke to Joey regarding refills of Sensors, Atorvastatin and Lisinopril. He informed me that he recently lost his job and his insurance. He has declined a refill of all medications at this time due to the high cost. I suggested looking into his local pharmacies or even a wholesale club for higher savings. I instructed him to have the new pharmacy contact [facility] to transfer those prescriptions over to the pharmacy of choice. He appreciates our help and hopes to get insurance soon so he can continue service with [facility].

## 2024-01-30 PROCEDURE — RXMED WILLOW AMBULATORY MEDICATION CHARGE

## 2024-01-31 ENCOUNTER — TELEPHONE (OUTPATIENT)
Dept: PHARMACY | Facility: MEDICAL CENTER | Age: 59
End: 2024-01-31
Payer: COMMERCIAL

## 2024-01-31 ENCOUNTER — PHARMACY VISIT (OUTPATIENT)
Dept: PHARMACY | Facility: MEDICAL CENTER | Age: 59
End: 2024-01-31
Payer: COMMERCIAL

## 2024-01-31 NOTE — TELEPHONE ENCOUNTER
Contact:             Phone number: [phone]     Name of person spoken with and relationship to patient: RASHMI MOON   Medication:   Patient’s Adherence:             How patient is doing on medication: well     How many missed doses and reason: 0     Any new medications: no     Any new conditions: no     Any new allergies: no     Any new side effects: no      Any new diagnoses: no      How many doses remainin     Did patient want to speak with pharmacist: no   Delivery:             Delivery date and method: USPS- PT REQUESTED USPS SHIP      Needs by Date:       Signature required: no     Any additional details for : ALEX   Teach Appointment Date: NA   Shipping Address: [address] [place] [id number]    Medication(name, strength and dose): FARXIGA 10MG 90DS    Copay: $15   Payment Method: CCOF    Supplies:    Additional Information:   PT STATED HE HAS LOST HIS JOB AND WILL GET [employer] TO FILL LIPITOR, LISINOPRIL. HE WILL HOLD OFF ON SENSOR REFILL AT THIS TIME. WILL MESSAGE ENDO LIAISON TO SEE IF WE CAN GET HELP FA ON SENSOR SINCE HE CANNOT AFFORD.  HE IS HAPPY FARXIGA IS STILL ONLY $15

## 2024-02-08 ENCOUNTER — TELEPHONE (OUTPATIENT)
Dept: PHARMACY | Facility: MEDICAL CENTER | Age: 59
End: 2024-02-08
Payer: COMMERCIAL

## 2024-02-08 NOTE — TELEPHONE ENCOUNTER
Spoke to Joey regarding a refill of FreeStyle Olivia 2 Sensors. He has declined a refill at this time stating he does not have the funds to pay the copay. He has scheduled a follow up call in about a week. He currently has 0 sensors available and nothing applied at this time. I have sent a message to the liaison inquiring about assistance.

## 2024-02-23 NOTE — TELEPHONE ENCOUNTER
Advised EC team member that a sample of the FreeStyle Olivia 2 is available for the patient at the clinic.    Thank you,  Meghana Oswald, [facility]  Endocrinology Pharmacy Coordinator

## 2024-03-12 DIAGNOSIS — F43.21 SITUATIONAL DEPRESSION: ICD-10-CM

## 2024-03-12 DIAGNOSIS — E11.65 UNCONTROLLED TYPE 2 DIABETES MELLITUS WITH HYPERGLYCEMIA (HCC): ICD-10-CM

## 2024-03-12 DIAGNOSIS — I10 ESSENTIAL HYPERTENSION: ICD-10-CM

## 2024-03-12 RX ORDER — DAPAGLIFLOZIN 10 MG/1
10 TABLET, FILM COATED ORAL DAILY
Qty: 90 TABLET | Refills: 1 | Status: SHIPPED | OUTPATIENT
Start: 2024-03-12

## 2024-03-12 RX ORDER — AMLODIPINE BESYLATE 10 MG/1
10 TABLET ORAL DAILY
Qty: 90 TABLET | Refills: 3 | Status: SHIPPED | OUTPATIENT
Start: 2024-03-12

## 2024-04-24 DIAGNOSIS — E11.65 UNCONTROLLED TYPE 2 DIABETES MELLITUS WITH HYPERGLYCEMIA (HCC): ICD-10-CM

## 2024-04-24 RX ORDER — SEMAGLUTIDE 0.68 MG/ML
0.5 INJECTION, SOLUTION SUBCUTANEOUS
Qty: 9 ML | Refills: 1 | Status: SHIPPED | OUTPATIENT
Start: 2024-04-24

## 2024-06-04 ENCOUNTER — DOCUMENTATION (OUTPATIENT)
Dept: HEALTH INFORMATION MANAGEMENT | Facility: OTHER | Age: 59
End: 2024-06-04
Payer: COMMERCIAL

## 2024-07-29 ENCOUNTER — TELEPHONE (OUTPATIENT)
Dept: HEALTH INFORMATION MANAGEMENT | Facility: OTHER | Age: 59
End: 2024-07-29

## 2024-07-29 DIAGNOSIS — R80.9 MICROALBUMINURIA: ICD-10-CM

## 2024-07-29 PROCEDURE — RXMED WILLOW AMBULATORY MEDICATION CHARGE: Performed by: INTERNAL MEDICINE

## 2024-07-29 RX ORDER — FINERENONE 10 MG/1
10 TABLET, FILM COATED ORAL DAILY
Qty: 30 TABLET | Refills: 0 | Status: SHIPPED | OUTPATIENT
Start: 2024-07-29

## 2024-07-30 ENCOUNTER — PHARMACY VISIT (OUTPATIENT)
Dept: PHARMACY | Facility: MEDICAL CENTER | Age: 59
End: 2024-07-30
Payer: COMMERCIAL

## 2024-09-05 ENCOUNTER — TELEPHONE (OUTPATIENT)
Dept: ENDOCRINOLOGY | Facility: MEDICAL CENTER | Age: 59
End: 2024-09-05
Payer: COMMERCIAL

## 2024-09-05 NOTE — TELEPHONE ENCOUNTER
Please send refill to PAP for 90-day supply with 1 years worth of refills to:     [facility], [place] - [address]     Thank you,  Meghana Oswald [facility]  Endocrinology Pharmacy Coordinator

## 2024-09-09 ENCOUNTER — TELEPHONE (OUTPATIENT)
Dept: ENDOCRINOLOGY | Facility: MEDICAL CENTER | Age: 59
End: 2024-09-09
Payer: COMMERCIAL

## 2024-09-09 DIAGNOSIS — E11.65 UNCONTROLLED TYPE 2 DIABETES MELLITUS WITH HYPERGLYCEMIA (HCC): ICD-10-CM

## 2024-09-09 RX ORDER — DAPAGLIFLOZIN 10 MG/1
10 TABLET, FILM COATED ORAL DAILY
Qty: 90 TABLET | Refills: 4 | Status: SHIPPED | OUTPATIENT
Start: 2024-09-09

## 2024-09-09 NOTE — TELEPHONE ENCOUNTER
I called [employer] @ [phone] to follow up with Farxiga refill/delivery. I spoke with representative Russell, who stated that the application has not been approved. He stated that the application is on hold, pending 1040 Tax forms (only approved proof of income) or a letter stating why the patient does not file taxes. Russell stated that this notice was faxed to providers office on 4/22/24, but would resend the notice via fax to: [phone].    Thank you,  Meghana Oswald, [facility]  Endocrinology Pharmacy Coordinator

## 2024-10-15 ENCOUNTER — OFFICE VISIT (OUTPATIENT)
Dept: ENDOCRINOLOGY | Facility: MEDICAL CENTER | Age: 59
End: 2024-10-15
Payer: MEDICAID

## 2024-10-15 VITALS
WEIGHT: 259 LBS | DIASTOLIC BLOOD PRESSURE: 62 MMHG | HEIGHT: 68 IN | HEART RATE: 71 BPM | SYSTOLIC BLOOD PRESSURE: 126 MMHG | BODY MASS INDEX: 39.25 KG/M2 | OXYGEN SATURATION: 93 %

## 2024-10-15 DIAGNOSIS — E55.9 VITAMIN D DEFICIENCY: ICD-10-CM

## 2024-10-15 DIAGNOSIS — R80.9 MICROALBUMINURIA: ICD-10-CM

## 2024-10-15 DIAGNOSIS — E78.5 DYSLIPIDEMIA: ICD-10-CM

## 2024-10-15 DIAGNOSIS — I10 ESSENTIAL HYPERTENSION: ICD-10-CM

## 2024-10-15 DIAGNOSIS — E11.65 UNCONTROLLED TYPE 2 DIABETES MELLITUS WITH HYPERGLYCEMIA (HCC): ICD-10-CM

## 2024-10-15 LAB
HBA1C MFR BLD: 7.6 % (ref ?–5.8)
POCT INT CON NEG: NEGATIVE
POCT INT CON POS: POSITIVE

## 2024-10-15 PROCEDURE — 3078F DIAST BP <80 MM HG: CPT

## 2024-10-15 PROCEDURE — 3074F SYST BP LT 130 MM HG: CPT

## 2024-10-15 PROCEDURE — 83036 HEMOGLOBIN GLYCOSYLATED A1C: CPT

## 2024-10-15 PROCEDURE — 99214 OFFICE O/P EST MOD 30 MIN: CPT

## 2024-10-15 PROCEDURE — 99212 OFFICE O/P EST SF 10 MIN: CPT

## 2024-10-15 RX ORDER — DAPAGLIFLOZIN 10 MG/1
10 TABLET, FILM COATED ORAL DAILY
Qty: 90 TABLET | Refills: 3 | Status: SHIPPED | OUTPATIENT
Start: 2024-10-15

## 2024-10-15 RX ORDER — BLOOD-GLUCOSE SENSOR
1 EACH MISCELLANEOUS
Qty: 6 EACH | Refills: 3 | Status: SHIPPED | OUTPATIENT
Start: 2024-10-15

## 2024-10-15 ASSESSMENT — FIBROSIS 4 INDEX: FIB4 SCORE: 0.75

## 2024-10-17 ENCOUNTER — TELEPHONE (OUTPATIENT)
Dept: PHARMACY | Facility: MEDICAL CENTER | Age: 59
End: 2024-10-17
Payer: MEDICAID

## 2024-10-17 PROCEDURE — RXMED WILLOW AMBULATORY MEDICATION CHARGE

## 2024-10-18 ENCOUNTER — HOSPITAL ENCOUNTER (OUTPATIENT)
Dept: LAB | Facility: MEDICAL CENTER | Age: 59
End: 2024-10-18
Payer: MEDICAID

## 2024-10-18 DIAGNOSIS — R80.9 MICROALBUMINURIA: ICD-10-CM

## 2024-10-18 DIAGNOSIS — E55.9 VITAMIN D DEFICIENCY: ICD-10-CM

## 2024-10-18 DIAGNOSIS — E78.5 DYSLIPIDEMIA: ICD-10-CM

## 2024-10-18 DIAGNOSIS — E11.65 UNCONTROLLED TYPE 2 DIABETES MELLITUS WITH HYPERGLYCEMIA (HCC): ICD-10-CM

## 2024-10-18 LAB
25(OH)D3 SERPL-MCNC: 43 NG/ML (ref 30–100)
ALBUMIN SERPL BCP-MCNC: 4 G/DL (ref 3.2–4.9)
ALBUMIN/GLOB SERPL: 1.3 G/DL
ALP SERPL-CCNC: 121 U/L (ref 30–99)
ALT SERPL-CCNC: 21 U/L (ref 2–50)
ANION GAP SERPL CALC-SCNC: 10 MMOL/L (ref 7–16)
AST SERPL-CCNC: 19 U/L (ref 12–45)
BILIRUB SERPL-MCNC: 0.3 MG/DL (ref 0.1–1.5)
BUN SERPL-MCNC: 16 MG/DL (ref 8–22)
CALCIUM ALBUM COR SERPL-MCNC: 9.4 MG/DL (ref 8.5–10.5)
CALCIUM SERPL-MCNC: 9.4 MG/DL (ref 8.5–10.5)
CHLORIDE SERPL-SCNC: 103 MMOL/L (ref 96–112)
CHOLEST SERPL-MCNC: 120 MG/DL (ref 100–199)
CO2 SERPL-SCNC: 24 MMOL/L (ref 20–33)
CREAT SERPL-MCNC: 0.86 MG/DL (ref 0.5–1.4)
FASTING STATUS PATIENT QL REPORTED: NORMAL
GFR SERPLBLD CREATININE-BSD FMLA CKD-EPI: 98 ML/MIN/1.73 M 2
GLOBULIN SER CALC-MCNC: 3 G/DL (ref 1.9–3.5)
GLUCOSE SERPL-MCNC: 120 MG/DL (ref 65–99)
HDLC SERPL-MCNC: 36 MG/DL
LDLC SERPL CALC-MCNC: 66 MG/DL
POTASSIUM SERPL-SCNC: 4.7 MMOL/L (ref 3.6–5.5)
PROT SERPL-MCNC: 7 G/DL (ref 6–8.2)
SODIUM SERPL-SCNC: 137 MMOL/L (ref 135–145)
T4 FREE SERPL-MCNC: 1.13 NG/DL (ref 0.93–1.7)
TRIGL SERPL-MCNC: 90 MG/DL (ref 0–149)
TSH SERPL-ACNC: 0.7 UIU/ML (ref 0.35–5.5)

## 2024-10-18 PROCEDURE — 80053 COMPREHEN METABOLIC PANEL: CPT

## 2024-10-18 PROCEDURE — 82306 VITAMIN D 25 HYDROXY: CPT

## 2024-10-18 PROCEDURE — 82570 ASSAY OF URINE CREATININE: CPT

## 2024-10-18 PROCEDURE — 84439 ASSAY OF FREE THYROXINE: CPT

## 2024-10-18 PROCEDURE — 84443 ASSAY THYROID STIM HORMONE: CPT

## 2024-10-18 PROCEDURE — 36415 COLL VENOUS BLD VENIPUNCTURE: CPT

## 2024-10-18 PROCEDURE — 80061 LIPID PANEL: CPT

## 2024-10-18 PROCEDURE — 82043 UR ALBUMIN QUANTITATIVE: CPT

## 2024-10-19 LAB
CREAT UR-MCNC: 125.47 MG/DL
MICROALBUMIN UR-MCNC: 32.3 MG/DL
MICROALBUMIN/CREAT UR: 257 MG/G (ref 0–30)

## 2024-10-21 ENCOUNTER — PHARMACY VISIT (OUTPATIENT)
Dept: PHARMACY | Facility: MEDICAL CENTER | Age: 59
End: 2024-10-21
Payer: COMMERCIAL

## 2024-10-27 DIAGNOSIS — E11.69 HYPERLIPIDEMIA ASSOCIATED WITH TYPE 2 DIABETES MELLITUS (HCC): ICD-10-CM

## 2024-10-27 DIAGNOSIS — E78.5 HYPERLIPIDEMIA ASSOCIATED WITH TYPE 2 DIABETES MELLITUS (HCC): ICD-10-CM

## 2024-10-27 RX ORDER — ATORVASTATIN CALCIUM 40 MG/1
TABLET, FILM COATED ORAL
Qty: 90 TABLET | Refills: 1 | Status: SHIPPED | OUTPATIENT
Start: 2024-10-27

## 2025-01-02 DIAGNOSIS — E11.69 TYPE 2 DIABETES MELLITUS WITH OTHER SPECIFIED COMPLICATION, WITH LONG-TERM CURRENT USE OF INSULIN (HCC): ICD-10-CM

## 2025-01-02 DIAGNOSIS — Z79.4 TYPE 2 DIABETES MELLITUS WITH OTHER SPECIFIED COMPLICATION, WITH LONG-TERM CURRENT USE OF INSULIN (HCC): ICD-10-CM

## 2025-01-02 PROCEDURE — RXMED WILLOW AMBULATORY MEDICATION CHARGE: Performed by: STUDENT IN AN ORGANIZED HEALTH CARE EDUCATION/TRAINING PROGRAM

## 2025-01-02 RX ORDER — METFORMIN HYDROCHLORIDE 500 MG/1
1000 TABLET, EXTENDED RELEASE ORAL 2 TIMES DAILY
Qty: 360 TABLET | Refills: 3 | Status: SHIPPED | OUTPATIENT
Start: 2025-01-02

## 2025-01-02 NOTE — TELEPHONE ENCOUNTER
Received request via: Pharmacy    Was the patient seen in the last year in this department? Yes    Does the patient have an active prescription (recently filled or refills available) for medication(s) requested? No    [facility] [address] [address]    Does the patient have long-term Plus and need 100-day supply? (This applies to ALL medications) Patient does not have SCP

## 2025-01-03 ENCOUNTER — TELEPHONE (OUTPATIENT)
Dept: PHARMACY | Facility: MEDICAL CENTER | Age: 60
End: 2025-01-03
Payer: MEDICAID

## 2025-01-03 ENCOUNTER — PHARMACY VISIT (OUTPATIENT)
Dept: PHARMACY | Facility: MEDICAL CENTER | Age: 60
End: 2025-01-03
Payer: COMMERCIAL

## 2025-01-03 PROCEDURE — RXMED WILLOW AMBULATORY MEDICATION CHARGE

## 2025-01-03 NOTE — TELEPHONE ENCOUNTER
Contact:  Phone number:[phone] (mobile)    Name of person spoken with and relationship to patient: Joey patient   Patient’s Adherence:  How patient is doing on medication: Very Well    How many missed doses and reason: 0 N/A    Any new medications: No    Any new conditions: No    Any new allergies: No    Any new side effects: No    Any new diagnoses: No    How many doses remainin    Did patient want to speak with pharmacist: No   Delivery:  Delivery date and method: 2025 via mail    Needs by Date: 2025    Signature required: No     Any additional details for : N/A   Teach Appointment Date:  N/A   Shipping Address:  [address] [place] [address]   Medication(name,strength and dose):  Farxiga Tablet 10 MG FreeStyle Olivia 3 Sensor Miscellaneous    Copay:  $0   Payment Method:  n/a $0 copay   Supplies:  NONE   Additional Information:  NONE     Perla Chung, Pharmacy Liaison/ RX Coordinator

## 2025-01-06 ENCOUNTER — PHARMACY VISIT (OUTPATIENT)
Dept: PHARMACY | Facility: MEDICAL CENTER | Age: 60
End: 2025-01-06
Payer: COMMERCIAL

## 2025-01-14 ENCOUNTER — OFFICE VISIT (OUTPATIENT)
Dept: ENDOCRINOLOGY | Facility: MEDICAL CENTER | Age: 60
End: 2025-01-14
Payer: MEDICAID

## 2025-01-14 VITALS
BODY MASS INDEX: 37.63 KG/M2 | DIASTOLIC BLOOD PRESSURE: 60 MMHG | OXYGEN SATURATION: 93 % | HEART RATE: 75 BPM | SYSTOLIC BLOOD PRESSURE: 122 MMHG | RESPIRATION RATE: 18 BRPM | WEIGHT: 248.3 LBS | HEIGHT: 68 IN

## 2025-01-14 DIAGNOSIS — F43.21 SITUATIONAL DEPRESSION: ICD-10-CM

## 2025-01-14 DIAGNOSIS — R80.9 MICROALBUMINURIA: ICD-10-CM

## 2025-01-14 DIAGNOSIS — E11.69 TYPE 2 DIABETES MELLITUS WITH OTHER SPECIFIED COMPLICATION, WITH LONG-TERM CURRENT USE OF INSULIN (HCC): ICD-10-CM

## 2025-01-14 DIAGNOSIS — E55.9 VITAMIN D DEFICIENCY: ICD-10-CM

## 2025-01-14 DIAGNOSIS — E11.69 HYPERLIPIDEMIA ASSOCIATED WITH TYPE 2 DIABETES MELLITUS (HCC): ICD-10-CM

## 2025-01-14 DIAGNOSIS — I10 ESSENTIAL HYPERTENSION: ICD-10-CM

## 2025-01-14 DIAGNOSIS — E78.5 HYPERLIPIDEMIA ASSOCIATED WITH TYPE 2 DIABETES MELLITUS (HCC): ICD-10-CM

## 2025-01-14 DIAGNOSIS — Z79.4 TYPE 2 DIABETES MELLITUS WITH OTHER SPECIFIED COMPLICATION, WITH LONG-TERM CURRENT USE OF INSULIN (HCC): ICD-10-CM

## 2025-01-14 DIAGNOSIS — E78.5 DYSLIPIDEMIA: ICD-10-CM

## 2025-01-14 LAB
HBA1C MFR BLD: 7.2 % (ref ?–5.8)
POCT INT CON NEG: NEGATIVE
POCT INT CON POS: POSITIVE

## 2025-01-14 PROCEDURE — 83036 HEMOGLOBIN GLYCOSYLATED A1C: CPT

## 2025-01-14 PROCEDURE — 95251 CONT GLUC MNTR ANALYSIS I&R: CPT

## 2025-01-14 PROCEDURE — 99214 OFFICE O/P EST MOD 30 MIN: CPT

## 2025-01-14 PROCEDURE — 99213 OFFICE O/P EST LOW 20 MIN: CPT

## 2025-01-14 PROCEDURE — 95249 CONT GLUC MNTR PT PROV EQP: CPT

## 2025-01-14 PROCEDURE — 3074F SYST BP LT 130 MM HG: CPT

## 2025-01-14 PROCEDURE — 3078F DIAST BP <80 MM HG: CPT

## 2025-01-14 RX ORDER — SERTRALINE HYDROCHLORIDE 100 MG/1
TABLET, FILM COATED ORAL
COMMUNITY

## 2025-01-14 NOTE — PROGRESS NOTES
"RN-CDE Note    Subjective:   Endocrinology Clinic Progress Note  PCP: Cheryl M. Demucha, A.P.R.N.    HPI:  Joey Dawson is a 59 y.o. old patient who is seen today by the Diabetes Nurse Specialist for review of his type 2 diabetes with long term use of insulin    Recent changes in health:  states his depression is worse, his PCP increased his antidepresent medication and has appt with therapist next week.   DM:   Last A1c:   Lab Results   Component Value Date/Time    HBA1C 7.2 (A) 01/14/2025 01:58 PM      Previous A1c was 7.6 on 10/15/2024  A1C GOAL: < 7    Diabetes Medications:   Glargine insulin 50 units per day  Farxiga 10 mg daily  Ozempic 0.50 mg weekly  Metformin 1000 ml daily         Exercise: Dog walking 2-3 times per week.  Diet: \"healthy\" diet  in general  Patient's body mass index is 37.75 kg/m². Exercise and nutrition counseling were performed at this visit.    Glucose monitoring frequency: using Freestyle Olivia CGM           Hypoglycemic episodes:  on occasion.  States he did have a sensor malfunction which was showing blood sugars in the 50 range.  States he was checking his blood sugar via FSBS and his blood sugars were really in the 120 range.      Last Retinal Exam: referral placed for Dr. Gomez.   Daily Foot Exam: Yes   Foot Exam:  Monofilament: done  Lab Results   Component Value Date/Time    MALBCRT 257 (H) 10/18/2024 12:44 PM    MICROALBUR 32.3 10/18/2024 12:44 PM        ACR Albumin/Creatinine Ratio goal <30     HTN:   Blood pressure goal <130/<80   Currently Rx ACE/ARB:  Lisinopril 40 mg daily    Dyslipidemia:    Lab Results   Component Value Date/Time    CHOLSTRLTOT 120 10/18/2024 12:44 PM    LDL 66 10/18/2024 12:44 PM    HDL 36 (A) 10/18/2024 12:44 PM    TRIGLYCERIDE 90 10/18/2024 12:44 PM         Currently Rx Statin:  Atorvastatin 40 mg daily    He  reports that he has never smoked. He has never used smokeless tobacco.    Plan:     Discussed and educated on:   - All medications, side " effects and compliance (discussed carefully)  - Annual eye examinations at Ophthalmology  - Foot Care:   - HbA1C: target  - Home glucose monitoring emphasized  - Weight control and daily exercise    Recommended medication changes: no changes.

## 2025-01-14 NOTE — PROGRESS NOTES
CHIEF COMPLAINT: Patient is here for follow up of   HPI:   Joey Dawson is a 57 y.o. male       Type 2 diabetes mellitus with hyperglycemia:   POC A1c on 1/14/25 is 7.2  POC A1c on 10/15/24 was 7.6   Latest Reference Range & Units 10/11/22 09:14 04/12/23 11:29 10/18/23 15:22   Glycohemoglobin 5.8 % 7.4 ! 7.4 ! 7.0 !       Medication regimen:  Glargine insulin 60 units per day  Farxiga 10 mg daily  Ozempic 0.50 mg weekly  Metformin 1000 mg daily    Denies hypoglycemia         The graph showing consistent hypoglycemia due to faulty sensor.       Diabetes Complications   Retinopathy: No known retinopathy.    Last eye exam: 05/2023  Referred to ophthalmology at [facility]. Dr. Shetty    Neuropathy: Denies paresthesias or numbness in hands or feet.   Denies any foot wounds.      Exercise: walking dog 2-3 times a week  Diet: Better, less fast food as he used to.     2.  Microalbuminuria:  Currently taking lisinopril 40 mg daily  Kerendia 10 mg daily  Blood pressure today at 122/60   Latest Reference Range & Units 10/18/24 12:44   Micro Alb Creat Ratio 0 - 30 mg/g 257 (H)   Creatinine, Urine mg/dL 125.47   Microalbumin, Urine Random mg/dL 32.3      Latest Reference Range & Units 10/18/24 12:44   Potassium 3.6 - 5.5 mmol/L 4.7      Latest Reference Range & Units 10/18/24 12:44   Bun 8 - 22 mg/dL 16   Creatinine 0.50 - 1.40 mg/dL 0.86   GFR (CKD-EPI) >60 mL/min/1.73 m 2 98     3.Dyslipidemia:  Hx of stroke Nov 2014 and 2020  He has hyperlipidemia and is on atorvastatin 40 mg daily   Latest Reference Range & Units 10/18/24 12:44   Cholesterol,Tot 100 - 199 mg/dL 120   Triglycerides 0 - 149 mg/dL 90   HDL >=40 mg/dL 36 !   LDL <100 mg/dL 66     4.  Essential hypertension:  Currently taking lisinopril 40 mg daily, Amlodipine 10 mg daily,   BP at 122/60    5.  Vitamin D deficiency:  Currently not on supplement.    Latest Reference Range & Units 10/18/24 12:44   25-Hydroxy   Vitamin D 25 30 - 100 ng/mL 43      Latest  Reference Range & Units 10/18/24 12:44   TSH 0.350 - 5.500 uIU/mL 0.700   Free T-4 0.93 - 1.70 ng/dL 1.13     Depression:  Currently taking sertiline. Dose was increased to 100 mg daily per pcp due to worsening of depression  He is set up to see a therapist.     Patient's medications, allergies, and social histories were reviewed and updated as appropriate.    ROS:     CONS:     No fever, no chills   EYES:     No diplopia, no blurry vision   CV:           No chest pain, no palpitations   PULM:     No SOB, no cough, no hemoptysis.   GI:            No nausea, no vomiting, no diarrhea, no constipation   ENDO:     No polyuria, no polydipsia, no heat intolerance, no cold intolerance       Past Medical History:  Problem List:  2023-06: Type 2 diabetes mellitus without complication, with long-  term current use of insulin ([facility])  2023-01: Healthcare maintenance  2022-06: Hyperlipidemia associated with type 2 diabetes mellitus ([facility])  2021-09: Diabetes mellitus ([facility])  2020-12: Essential hypertension  2020-12: Thyroid nodule  2020-11: DESTIN (obstructive sleep apnea)  2020-11: Acute hemorrhagic CVA (Pontine hemorrhage) ([facility])  2020-11: Elevated troponin  2020-10: Situational depression  2020-10: Trochanteric bursitis of left hip  2019-05: Soft tissue mass  2019-05: Obesity (BMI 30-39.9)  2015-06: ED (erectile dysfunction)  2015-04: Vitamin D deficiency  2015-04: RBC microcytosis  2015-04: Microalbuminuria  2015-04: Uncontrolled type 2 diabetes mellitus with hyperglycemia   ([facility])  2015-04: Hypertensive emergency  2015-04: Sleep apnea  2015-04: Mixed hyperlipidemia  2015-04: History of CVA (cerebrovascular accident) without residual   deficits      Past Surgical History:  History reviewed. No pertinent surgical history.     Allergies:  Food     Social History:  Social History     Tobacco Use    Smoking status: Never    Smokeless tobacco: Never   Vaping Use    Vaping status: Never Used   Substance Use Topics    Alcohol use: Yes      "Alcohol/week: 1.2 oz     Types: 2 Cans of beer per week     Comment: yearly    Drug use: No        Family History:   family history includes Cancer in his mother; Heart Disease in his father; No Known Problems in his sister.      PHYSICAL EXAM:   OBJECTIVE:  Vital signs: /60 (BP Location: Left arm, Patient Position: Sitting)   Pulse 75   Resp 18   Ht 1.727 m (5' 8\")   Wt 113 kg (248 lb 4.8 oz)   SpO2 93%   BMI 37.75 kg/m²   GENERAL: Well-developed, well-nourished in no apparent distress.   SKIN: No rashes, lesions.     Labs:  Lab Results   Component Value Date/Time    HBA1C 7.2 (A) 01/14/2025 01:58 PM      Lab Results   Component Value Date/Time    SODIUM 137 10/18/2024 12:44 PM    POTASSIUM 4.7 10/18/2024 12:44 PM    CHLORIDE 103 10/18/2024 12:44 PM    CO2 24 10/18/2024 12:44 PM    ANION 10.0 10/18/2024 12:44 PM    GLUCOSE 120 (H) 10/18/2024 12:44 PM    BUN 16 10/18/2024 12:44 PM    CREATININE 0.86 10/18/2024 12:44 PM    CALCIUM 9.4 10/18/2024 12:44 PM    ASTSGOT 19 10/18/2024 12:44 PM    ALTSGPT 21 10/18/2024 12:44 PM    TBILIRUBIN 0.3 10/18/2024 12:44 PM    ALBUMIN 4.0 10/18/2024 12:44 PM    TOTPROTEIN 7.0 10/18/2024 12:44 PM    GLOBULIN 3.0 10/18/2024 12:44 PM    AGRATIO 1.3 10/18/2024 12:44 PM       Lab Results   Component Value Date/Time    CHOLSTRLTOT 104 02/06/2021 0856    TRIGLYCERIDE 76 02/06/2021 0856    HDL 33 (A) 02/06/2021 0856    LDL 56 02/06/2021 0856       Lab Results   Component Value Date/Time    MALBCRT 257 (H) 10/18/2024 12:44 PM    MICROALBUR 32.3 10/18/2024 12:44 PM        ASSESSMENT/PLAN:   1. Type 2 diabetes mellitus with other specified complication, with long-term current use of insulin (HCC)  Stable   A1c 7.2  Medication regimen:  Metformin 1000 mg daily -Continue   Glargine 60 units at night -Continue   Ozempic 0.5 mg weekly - continue  Farxiga 10 mg daily - continue  CGM downloaded and reviewed  - Exercise for least 150 minutes/week  - Stable hydration  - Daily feet check  - " Maintain a healthy diet, minimal carbohydrate, portion control   - POCT Hemoglobin A1C  - Diabetic Monofilament LE Exam  - Referral to Ophthalmology    2. Hyperlipidemia associated with type 2 diabetes mellitus (HCC)  Stable  Continue current regimen- see HPI    3. Microalbuminuria  Unstable - improving  Medication:  Kerendia 10 mg daily - continue  Lisinopril 40 mg daily - continue  - Comp Metabolic Panel; Future  - MICROALBUMIN CREAT RATIO URINE; Future    4. Dyslipidemia  Stable  Continue regimen-HPI     5. Essential hypertension  Stable  Continue current regimen- see HPI    6. Vitamin D deficiency  Stable  Recommend starting multivitamin daily  - VITAMIN D,25 HYDROXY (DEFICIENCY); Future    7. Situational depression  Unstable  Continue current regimen- see HPI  This is followed by pcp and therapist       Disposition: Return in about 3 months (around 4/14/2025).   Do your blood work 2 weeks prior to next appointment    Thank you kindly for allowing me to participate in the diabetes care plan for this patient.      JEEVAN Ruff   1/14/25      CC:   Pcp Pt States None

## 2025-01-17 DIAGNOSIS — I10 ESSENTIAL HYPERTENSION: ICD-10-CM

## 2025-01-21 DIAGNOSIS — I10 ESSENTIAL HYPERTENSION: ICD-10-CM

## 2025-01-21 RX ORDER — AMLODIPINE BESYLATE 10 MG/1
10 TABLET ORAL DAILY
Qty: 90 TABLET | Refills: 3 | Status: SHIPPED | OUTPATIENT
Start: 2025-01-21

## 2025-01-21 RX ORDER — LISINOPRIL 40 MG/1
TABLET ORAL
Qty: 90 TABLET | Refills: 2 | Status: SHIPPED | OUTPATIENT
Start: 2025-01-21

## 2025-01-21 NOTE — TELEPHONE ENCOUNTER
Received request via: Pharmacy    Was the patient seen in the last year in this department? Yes    Does the patient have an active prescription (recently filled or refills available) for medication(s) requested? No    [facility] [address] [address]    Does the patient have longterm Plus and need 100-day supply? (This applies to ALL medications) Patient does not have SCP

## 2025-01-30 ENCOUNTER — NON-PROVIDER VISIT (OUTPATIENT)
Dept: OCCUPATIONAL MEDICINE | Facility: CLINIC | Age: 60
End: 2025-01-30

## 2025-02-12 ENCOUNTER — OFFICE VISIT (OUTPATIENT)
Dept: OCCUPATIONAL MEDICINE | Facility: CLINIC | Age: 60
End: 2025-02-12

## 2025-02-12 DIAGNOSIS — Z02.89 VISIT FOR OCCUPATIONAL HEALTH EXAMINATION: Primary | ICD-10-CM

## 2025-02-12 PROCEDURE — 7100 PR DOT PHYSICAL: Performed by: NURSE PRACTITIONER

## 2025-02-18 DIAGNOSIS — F43.21 SITUATIONAL DEPRESSION: ICD-10-CM

## 2025-02-18 NOTE — TELEPHONE ENCOUNTER
Received request via: Pharmacy    Was the patient seen in the last year in this department? Yes    Does the patient have an active prescription (recently filled or refills available) for medication(s) requested? No    Pharmacy Name: [facility]    Does the patient have [facility] and need 100-day supply? (This applies to ALL medications) Patient does not have SCP

## 2025-02-19 RX ORDER — SERTRALINE HYDROCHLORIDE 100 MG/1
100 TABLET, FILM COATED ORAL DAILY
Qty: 30 TABLET | Refills: 0 | Status: SHIPPED | OUTPATIENT
Start: 2025-02-19 | End: 2025-02-20 | Stop reason: SDUPTHER

## 2025-02-20 RX ORDER — SERTRALINE HYDROCHLORIDE 100 MG/1
100 TABLET, FILM COATED ORAL DAILY
Qty: 30 TABLET | Refills: 0 | Status: SHIPPED | OUTPATIENT
Start: 2025-02-20

## 2025-02-20 NOTE — TELEPHONE ENCOUNTER
Received request via: Pharmacy    Was the patient seen in the last year in this department? Yes    Does the patient have an active prescription (recently filled or refills available) for medication(s) requested? No    Pharmacy Name: [facility] [address] [place] [id number]    Does the patient have retirement Plus and need 100-day supply? (This applies to ALL medications) Patient does not have SCP

## 2025-03-11 ENCOUNTER — HOSPITAL ENCOUNTER (OUTPATIENT)
Dept: RADIOLOGY | Facility: MEDICAL CENTER | Age: 60
End: 2025-03-11
Payer: MEDICAID

## 2025-03-11 ENCOUNTER — OFFICE VISIT (OUTPATIENT)
Dept: URGENT CARE | Facility: PHYSICIAN GROUP | Age: 60
End: 2025-03-11
Payer: MEDICAID

## 2025-03-11 VITALS
HEART RATE: 83 BPM | TEMPERATURE: 97.1 F | WEIGHT: 240 LBS | SYSTOLIC BLOOD PRESSURE: 118 MMHG | OXYGEN SATURATION: 93 % | BODY MASS INDEX: 36.37 KG/M2 | RESPIRATION RATE: 14 BRPM | DIASTOLIC BLOOD PRESSURE: 72 MMHG | HEIGHT: 68 IN

## 2025-03-11 DIAGNOSIS — N43.3 HYDROCELE OF TESTIS: ICD-10-CM

## 2025-03-11 DIAGNOSIS — I10 ESSENTIAL HYPERTENSION: ICD-10-CM

## 2025-03-11 DIAGNOSIS — N50.89 TESTICLE SWELLING: ICD-10-CM

## 2025-03-11 PROCEDURE — 76870 US EXAM SCROTUM: CPT

## 2025-03-11 PROCEDURE — 3078F DIAST BP <80 MM HG: CPT

## 2025-03-11 PROCEDURE — 99213 OFFICE O/P EST LOW 20 MIN: CPT

## 2025-03-11 PROCEDURE — 3074F SYST BP LT 130 MM HG: CPT

## 2025-03-11 RX ORDER — AMLODIPINE BESYLATE 10 MG/1
10 TABLET ORAL DAILY
Qty: 90 TABLET | Refills: 3 | Status: SHIPPED | OUTPATIENT
Start: 2025-03-11

## 2025-03-11 ASSESSMENT — ENCOUNTER SYMPTOMS
AFFECTED TESTICLE: 1
DIARRHEA: 0
CHILLS: 0
NAUSEA: 0
WEAKNESS: 0
DIZZINESS: 0
COUGH: 0
FLANK PAIN: 0
PALPITATIONS: 0
ABDOMINAL PAIN: 0
DIAPHORESIS: 0
HEADACHES: 0
WEIGHT LOSS: 0
WHEEZING: 0
VOMITING: 0
SHORTNESS OF BREATH: 0
FEVER: 0

## 2025-03-11 NOTE — LETTER
[facility] [address] [address]     March 11, 2025    Patient: Joey Dawson Jr.   YOB: 1965   Date of Visit: 3/11/2025       To Whom It May Concern:    Joey Dawson was seen and treated in our department on 3/11/2025. Please excuse absence from work due to illness starting 3/11/25 through 3/14/25. Patient can return sooner if he's feeling better.     Sincerely,     LEE Espino.

## 2025-03-11 NOTE — Clinical Note
Ronald Reese, Just an FYI that your patient presented to urgent care today with right testicular edema and pain.  I ordered an ultrasound and referred to urology.  I will also have him follow up with you for further evaluation.  with patient

## 2025-03-11 NOTE — TELEPHONE ENCOUNTER
Medication Requested: Amlodipine 10 MG      Insulin pen Or vial? : N/A        Days Supply: 90        Pharmacy: [facility] [address] [place] [address] Dmitriy MURDOCK [id number]        Number of Refills: 3

## 2025-03-11 NOTE — PROGRESS NOTES
"Subjective     Joey Dawson Jr. is a 59 y.o. male who presents with Testicle Swelling (L side )            Testicle Pain  This is a new problem. The current episode started 1 to 4 weeks ago (4 weeks ago started developing swelling, today developed pain). The problem has been gradually worsening. Pertinent negatives include no abdominal pain, chest pain, chills, coughing, diaphoresis, fever, headaches, nausea, rash, urinary symptoms, vomiting or weakness. He has tried NSAIDs for the symptoms. The treatment provided moderate relief.       Review of Systems   Constitutional:  Negative for chills, diaphoresis, fever, malaise/fatigue and weight loss.   Respiratory:  Negative for cough, shortness of breath and wheezing.    Cardiovascular:  Negative for chest pain and palpitations.   Gastrointestinal:  Negative for abdominal pain, diarrhea, nausea and vomiting.   Genitourinary:  Negative for dysuria, flank pain, frequency, hematuria and urgency.   Skin:  Negative for rash.   Neurological:  Negative for dizziness, weakness and headaches.              Objective     /72   Pulse 83   Temp 36.2 °C (97.1 °F) (Temporal)   Resp 14   Ht 1.727 m (5' 8\")   Wt 109 kg (240 lb)   SpO2 93%   BMI 36.49 kg/m²      Physical Exam  Constitutional:       General: He is not in acute distress.     Appearance: Normal appearance. He is obese. He is not ill-appearing.   HENT:      Head: Normocephalic and atraumatic.      Nose: Nose normal. No congestion or rhinorrhea.      Mouth/Throat:      Mouth: Mucous membranes are moist.      Pharynx: No posterior oropharyngeal erythema.   Eyes:      Extraocular Movements: Extraocular movements intact.      Pupils: Pupils are equal, round, and reactive to light.   Cardiovascular:      Rate and Rhythm: Normal rate and regular rhythm.   Pulmonary:      Effort: Pulmonary effort is normal. No respiratory distress.      Breath sounds: Normal breath sounds. No stridor. No wheezing or " rhonchi.   Abdominal:      Hernia: There is no hernia in the right inguinal area.   Genitourinary:     Penis: No erythema, tenderness, discharge, swelling or lesions.       Testes:         Right: Mass, tenderness and swelling present.   Musculoskeletal:         General: Normal range of motion.      Cervical back: Normal range of motion and neck supple.   Lymphadenopathy:      Cervical: No cervical adenopathy.      Lower Body: No right inguinal adenopathy.   Skin:     General: Skin is warm and dry.   Neurological:      General: No focal deficit present.      Mental Status: He is alert and oriented to person, place, and time.                        Assessment & Plan  Testicle swelling    Orders:    [id number]    Referral to Urology       This is an acute condition.  Previous visits, past medical history, medications, and vital signs reviewed.  Patient not acutely ill-appearing, or in acute distress.  Vital signs stable.  Patient reports he noticed right testicular swelling starting 4 weeks ago, and that testicular pain started this morning.  Patient denies trauma/injury, dysuria, urgency/frequency, abnormal penile discharge, or lesions.  No decreased urine output, CVA tenderness, or suprapubic pain.  Abdominal exam negative.  Ultrasound ordered.  Referral for urology.  Encouraged patient to also follow-up with primary care for further management and evaluation.  Encouraged patient to return to clinic if he does develop dysuria, urgency, or frequency.  Strict ER precautions if he is having difficulty voiding, flank pain, fever, or severe testicular or abdominal pain.  Patient understands and is agreeable to treatment plan.  Denies further questions.

## 2025-03-12 ENCOUNTER — OFFICE VISIT (OUTPATIENT)
Dept: UROLOGY | Facility: MEDICAL CENTER | Age: 60
End: 2025-03-12
Payer: MEDICAID

## 2025-03-12 DIAGNOSIS — N43.3 HYDROCELE, RIGHT: ICD-10-CM

## 2025-03-12 PROCEDURE — 99213 OFFICE O/P EST LOW 20 MIN: CPT

## 2025-03-12 NOTE — PROGRESS NOTES
Subjective  Joey Dawson Jr. is a 59 y.o. male who presents today for evaluation of hydrocele.  Patient was seen in urgent care yesterday where he reported right testicular swelling x 3-4 weeks ago.  He reported that he is having testicular pain that started this morning.    Patient denied trauma/injury, dysuria, urgency, frequency, abnormal penile discharge, and lesions.  He also denies decreased urine output CVA tenderness or suprapubic pain    Ultrasound reviewed and showed large complex right hydrocele small left hydrocele, no testicular mass was identified    Family History   Problem Relation Age of Onset    Cancer Mother         Breast cancer    Heart Disease Father         CHF    No Known Problems Sister        Social History     Socioeconomic History    Marital status: Single     Spouse name: Not on file    Number of children: Not on file    Years of education: Not on file    Highest education level: Some college, no degree   Occupational History    Not on file   Tobacco Use    Smoking status: Never    Smokeless tobacco: Never   Vaping Use    Vaping status: Never Used   Substance and Sexual Activity    Alcohol use: Yes     Alcohol/week: 1.2 oz     Types: 2 Cans of beer per week     Comment: yearly    Drug use: No    Sexual activity: Yes   Other Topics Concern    Not on file   Social History Narrative    Not on file     Social Drivers of Health     Financial Resource Strain: Low Risk  (1/8/2023)    Overall Financial Resource Strain (CARDIA)     Difficulty of Paying Living Expenses: Not hard at all   Food Insecurity: No Food Insecurity (1/8/2023)    Hunger Vital Sign     Worried About Running Out of Food in the Last Year: Never true     Ran Out of Food in the Last Year: Never true   Transportation Needs: No Transportation Needs (1/8/2023)    PRAPARE - Transportation     Lack of Transportation (Medical): No     Lack of Transportation (Non-Medical): No   Physical Activity: Unknown (1/8/2023)     Exercise Vital Sign     Days of Exercise per Week: Patient declined     Minutes of Exercise per Session: Patient declined   Stress: No Stress Concern Present (1/8/2023)    [employer] - Occupational Stress Questionnaire     Feeling of Stress : Not at all   Social Connections: Socially Isolated (1/8/2023)    Social Connection and Isolation Panel [NHANES]     Frequency of Communication with Friends and Family: More than three times a week     Frequency of Social Gatherings with Friends and Family: More than three times a week     Attends Hoahaoism Services: Never     Active Member of Clubs or Organizations: No     Attends Club or Organization Meetings: Never     Marital Status: Never    Intimate Partner Violence: Not on file   Housing Stability: Low Risk  (1/8/2023)    Housing Stability Vital Sign     Unable to Pay for Housing in the Last Year: No     Number of Places Lived in the Last Year: 1     Unstable Housing in the Last Year: No       No past surgical history on file.    Past Medical History:   Diagnosis Date    Acute hemorrhagic CVA (Pontine hemorrhage) ([facility]) 11/9/2020    Hyperlipidemia     Hypertension     Stroke ([facility])     Type II or unspecified type diabetes mellitus without mention of complication, not stated as uncontrolled     Uncontrolled type 2 diabetes mellitus with hyperglycemia ([facility]) 4/9/2015       Current Outpatient Medications   Medication Sig Dispense Refill    amLODIPine (NORVASC) 10 MG Tab Take 1 Tablet by mouth every day. 90 Tablet 3    sertraline (ZOLOFT) 100 MG Tab Take 1 Tablet by mouth every day. 30 Tablet 0    lisinopril (PRINIVIL) 40 MG tablet TAKE ONE TABLET BY MOUTH EVERY DAY 90 Tablet 2    metFORMIN ER (GLUCOPHAGE XR) 500 MG TABLET SR 24 HR Take 2 Tablets by mouth 2 times a day. (Patient taking differently: Take 1,000 mg by mouth every day.) 360 Tablet 3    atorvastatin (LIPITOR) 40 MG Tab TAKE ONE TABLET BY MOUTH EVERY EVENING AT BEDTIME 90 Tablet  1    Continuous Glucose Sensor (FREESTYLE JONES 3 SENSOR) Misc 1 Each every 14 days. 6 Each 3    dapagliflozin propanediol (FARXIGA) 10 MG Tab Take 1 Tablet by mouth every day. 90 Tablet 3    Finerenone (KERENDIA) 10 MG Tab Take 10 mg by mouth every day. 30 Tablet 0    Semaglutide,0.25 or 0.5MG/DOS, (OZEMPIC, 0.25 OR 0.5 MG/DOSE,) 2 MG/3ML Solution Pen-injector Inject 0.5 mg under the skin every 7 days. 9 mL 1    insulin glargine 100 UNIT/ML Solution Pen-injector injection DIAL AND INJECT 60 UNITS UNDER THE SKIN DAILY IN THE EVENING. MAX DAILY DOSE IS 60 UNITS. (Patient taking differently: 50 Units.) 75 mL 0    Insulin Pen Needle 32 G x 4 mm 1 Each every day. 100 Each 3    Insulin Pen Needle use for injections daily 100 Each 3    aspirin (ASA) 81 MG Chew Tab chewable tablet Chew 81 mg every day.       No current facility-administered medications for this visit.       Allergies   Allergen Reactions    Food      Spicy cinnamon--sores in mouth and tongue swelling       Objective  There were no vitals taken for this visit.  Physical Exam  Constitutional:       Appearance: Normal appearance.   HENT:      Head: Normocephalic and atraumatic.   Eyes:      Extraocular Movements: Extraocular movements intact.   Pulmonary:      Effort: Pulmonary effort is normal.   Genitourinary:     Comments: Testicles assessed.  Left testicle soft contour, right testicle not palpable related to hydrocele on right side.  Neurological:      Mental Status: He is alert.         Labs:   none    Imaging:     [id number]  Order: [id number]   Status: Final result       Next appt: Today at 03:45 PM in Urology (Sophie Velazquez A.P.R.N.)       Dx: Testicle swelling    Test Result Released: Yes (seen)    0 Result Notes  Details    Reading Physician Reading Date Result Priority   Royce Becker M.D.  [phone]     3/11/2025      Narrative & Impression     3/11/2025 4:38 PM     HISTORY/REASON FOR EXAM: Swelling. Testicular swelling.      TECHNIQUE/EXAM DESCRIPTION:  Real-time sonography of the scrotum was performed with gray-scale, color and duplex Doppler imaging.     COMPARISON: None     FINDINGS:     The right testis measures 4.5 x 3.1 x 3.1 cm. Normal in size and echotexture. Normal vascularity on color Doppler. No intratesticular mass.     The left testis measures 3.9 x 2.5 x 3.2 cm. Normal in size and echotexture. Normal vascularity on color Doppler. No intratesticular mass.     Vascular flow is symmetric.     Appearance of the epididymides are within normal limits.     Large complex right hydrocele. Small left hydrocele.     No varicocele is detected.     IMPRESSION:     1.  No testicular mass identified.     2. Large complex right hydrocele. Small left hydrocele.       Assessment    For right hydrocele: I have recommended patient have surgery for this right hydrocele.  Right hydrocele the size of a golf ball.  Patient reports is causing pain with sitting on it, discomfort with wearing his pants.    Ultrasound reviewed with patient      Patient wishes to proceed for elective surgery for a right hydrocele.     Plan    Surgery scheduler to reach out to patient  Right hydrocelectomy    Problem List Items Addressed This Visit    None

## 2025-03-12 NOTE — Clinical Note
REFERRAL APPROVAL NOTICE         Sent on March 12, 2025                   Joey Dawson   [address] [address]                   Dear Mr. Dawson,    After a careful review of the medical information and benefit coverage, [facility] has processed your referral. See below for additional details.    If applicable, you must be actively enrolled with your insurance for coverage of the authorized service. If you have any questions regarding your coverage, please contact your insurance directly.    REFERRAL INFORMATION   Referral #:  [id number]  Referred-To Department    Referred-By Provider:  Urology    JEEVAN Espino   [facility] Urology      [address]  [place] [address]  [phone] [address]  [place] [id number] [phone]    Referral Start Date:  03/11/2025  Referral End Date:   03/11/2026             SCHEDULING  If you do not already have an appointment, please call [phone] to make an appointment.     MORE INFORMATION  If you do not already have a [employer] account, sign up at: [email or web address]  You can access your medical information, make appointments, see lab results, billing information, and more.  If you have questions regarding this referral, please contact  the [facility] Referrals department at:             [phone]. Monday - Friday 8:00AM - 5:00PM.     Sincerely,    [facility]

## 2025-03-13 ENCOUNTER — TELEPHONE (OUTPATIENT)
Dept: UROLOGY | Facility: MEDICAL CENTER | Age: 60
End: 2025-03-13
Payer: MEDICAID

## 2025-03-21 ENCOUNTER — TELEPHONE (OUTPATIENT)
Dept: PHARMACY | Facility: MEDICAL CENTER | Age: 60
End: 2025-03-21
Payer: MEDICAID

## 2025-03-21 PROCEDURE — RXMED WILLOW AMBULATORY MEDICATION CHARGE

## 2025-03-21 NOTE — TELEPHONE ENCOUNTER
Contact:  Phone number:[phone] (mobile)    Name of person spoken with and relationship to patient: Joey patient   Patient’s Adherence:  How patient is doing on medication: Very Well    How many missed doses and reason: 0 N/A    Any new medications: No    Any new conditions: No    Any new allergies: No    Any new side effects: No    Any new diagnoses: No    How many doses remainin    Did patient want to speak with pharmacist: No   Delivery:  Delivery date and method: 2025 via MAil    Needs by Date: 2025    Signature required: No     Any additional details for : N/A   Teach Appointment Date:  N/A   Shipping Address:  [address] [place] [address]   Medication(name,strength and dose):  FREESTYLE JONES 3 SENSOR MISCELLANEOUS   Copay:  $0   Payment Method:  n/a $0 copay   Supplies:  NONE   Additional Information:  NONE     Perla Chung, Pharmacy Liaison/ RX Coordinator

## 2025-03-21 NOTE — TELEPHONE ENCOUNTER
Free Med from  - Patient came into the clinic to complete the 2025 re-enrollment PAP application and provided current proof of income.  Medication: LANTUS SOLOSTAR SOLUTION PEN-INJECTOR 100 UNIT/ML  Household size:1  Annual Household Adjusted Gross Income: $44,359  Additional information/consent to FA: Patient gave verbal consent to obtain FA through [[employer]]. Patient has Government sponsored insurance. Successfully faxed the 2025 re-enrollment PAP application to [[employer]] @ [Fax # [phone]].    Perla Chung, Pharmacy Liaison/ RX Coordinator

## 2025-03-21 NOTE — TELEPHONE ENCOUNTER
Free Med from  - Patient came into the clinic to complete the 2025 re-enrollment PAP application and provided current proof of income.  Medication: KERENDIA TABLET 10 MG  Household size:1  Annual Household Adjusted Gross Income: $44,359  Additional information/consent to FA: Patient gave verbal consent to obtain FA through [[employer]]. Patient has Government sponsored insurance. Successfully faxed the 2025 re-enrollment PAP application to [[employer]] @ [Fax # [phone]].    Perla Chung, Pharmacy Liaison/ RX Coordinator

## 2025-03-21 NOTE — TELEPHONE ENCOUNTER
Free Med from  - Patient came into the clinic to complete the 2025 re-enrollment PAP application and provided current proof of income.  Medication: OZEMPIC (0.25 OR 0.5 MG/DOSE) SOLUTION PEN-INJECTOR 2 MG/3ML  Household size:1  Annual Household Adjusted Gross Income: $44,359  Additional information/consent to FA: Patient gave verbal consent to obtain FA through [[employer]]. Patient has Government sponsored insurance. Successfully faxed the 2025 re-enrollment PAP application to [[employer]] @ [Fax # [phone]].    Perla Chung, Pharmacy Liaison/ RX Coordinator

## 2025-03-24 ENCOUNTER — PHARMACY VISIT (OUTPATIENT)
Dept: PHARMACY | Facility: MEDICAL CENTER | Age: 60
End: 2025-03-24
Payer: COMMERCIAL

## 2025-04-02 ENCOUNTER — TELEPHONE (OUTPATIENT)
Dept: PHARMACY | Facility: MEDICAL CENTER | Age: 60
End: 2025-04-02
Payer: COMMERCIAL

## 2025-04-02 PROCEDURE — RXMED WILLOW AMBULATORY MEDICATION CHARGE

## 2025-04-02 NOTE — TELEPHONE ENCOUNTER
Contact:  Phone number:[phone] (mobile)    Name of person spoken with and relationship to patient: Joey patient   Patient’s Adherence:  How patient is doing on medication: Very Well    How many missed doses and reason: 0 N/A    Any new medications: No    Any new conditions: No    Any new allergies: No    Any new side effects: No    Any new diagnoses: No    How many doses remainin    Did patient want to speak with pharmacist: No   Delivery:  Delivery date and method: 2025 via Mail    Needs by Date: 04/10/2025    Signature required: No     Any additional details for : N/A   Teach Appointment Date:  N/A   Shipping Address:  [address] [place] [address]   Medication(name,strength and dose):  FARXIGA TABLET 10 MG   Copay:  $0   Payment Method:  Credit card on file   Supplies:  NONE   Additional Information:  NONE     Perla Chung, Pharmacy Liaison/ RX Coordinator

## 2025-04-09 ENCOUNTER — PHARMACY VISIT (OUTPATIENT)
Dept: PHARMACY | Facility: MEDICAL CENTER | Age: 60
End: 2025-04-09
Payer: COMMERCIAL

## 2025-04-21 ENCOUNTER — TELEPHONE (OUTPATIENT)
Dept: UROLOGY | Facility: MEDICAL CENTER | Age: 60
End: 2025-04-21
Payer: COMMERCIAL

## 2025-04-22 ENCOUNTER — TELEPHONE (OUTPATIENT)
Dept: UROLOGY | Facility: MEDICAL CENTER | Age: 60
End: 2025-04-22
Payer: COMMERCIAL

## 2025-04-22 DIAGNOSIS — E78.5 HYPERLIPIDEMIA ASSOCIATED WITH TYPE 2 DIABETES MELLITUS (HCC): ICD-10-CM

## 2025-04-22 DIAGNOSIS — E11.69 HYPERLIPIDEMIA ASSOCIATED WITH TYPE 2 DIABETES MELLITUS (HCC): ICD-10-CM

## 2025-04-22 RX ORDER — ATORVASTATIN CALCIUM 40 MG/1
40 TABLET, FILM COATED ORAL
Qty: 90 TABLET | Refills: 1 | Status: SHIPPED | OUTPATIENT
Start: 2025-04-22

## 2025-04-22 NOTE — TELEPHONE ENCOUNTER
Sapphire Bucio, Med Ass't to oJey Dawson Jr.  [place]      4/21/25  8:56 AM  Called pt lvm to get updated insurance was told he was switching to [employer].

## 2025-04-23 ENCOUNTER — PRE-ADMISSION TESTING (OUTPATIENT)
Dept: ADMISSIONS | Facility: MEDICAL CENTER | Age: 60
End: 2025-04-23
Attending: UROLOGY
Payer: MEDICAID

## 2025-04-23 VITALS — HEIGHT: 68 IN | BODY MASS INDEX: 36.49 KG/M2

## 2025-04-23 DIAGNOSIS — Z01.810 PRE-OPERATIVE CARDIOVASCULAR EXAMINATION: ICD-10-CM

## 2025-04-23 DIAGNOSIS — Z01.812 PRE-OPERATIVE LABORATORY EXAMINATION: ICD-10-CM

## 2025-04-23 NOTE — PREADMIT AVS NOTE
Current Medications   Medication Instructions    atorvastatin (LIPITOR) 40 MG Tab Continue taking medication as prescribed, including morning of procedure (takes nightly)    amLODIPine (NORVASC) 10 MG Tab Continue taking medication as prescribed, including morning of procedure (takes nightly)    sertraline (ZOLOFT) 100 MG Tab Continue taking medication as prescribed, including morning of procedure (takes nightly)    lisinopril (PRINIVIL) 40 MG tablet Stop 24 hours before surgery    metFORMIN ER (GLUCOPHAGE XR) 500 MG TABLET SR 24 HR Hold medication day of procedure    dapagliflozin propanediol (FARXIGA) 10 MG Tab Stop 4 days before surgery    Finerenone (KERENDIA) 10 MG Tab Hold medication day of procedure    Semaglutide,0.25 or 0.5MG/DOS, (OZEMPIC, 0.25 OR 0.5 MG/DOSE,) 2 MG/3ML Solution Pen-injector Stop 7 days before surgery    insulin glargine 100 UNIT/ML Solution Pen-injector injection Follow instructions from surgeon or specialist.    aspirin (ASA) 81 MG Chew Tab chewable tablet Follow instructions from surgeon or specialist.

## 2025-04-23 NOTE — PREPROCEDURE INSTRUCTIONS
PreAdmit Telephone Appointment: Reviewed the Preparing for your procedure handout with patient. Patient instructed per pharmacy guidelines regarding taking, holding or contacting provider for instructions on regularly prescribed medications before surgery.    Confirmed with patient where to check in morning of surgery.     Testing appointment 4/24.

## 2025-04-24 ENCOUNTER — PRE-ADMISSION TESTING (OUTPATIENT)
Dept: ADMISSIONS | Facility: MEDICAL CENTER | Age: 60
End: 2025-04-24
Attending: UROLOGY
Payer: MEDICAID

## 2025-04-24 DIAGNOSIS — Z01.812 PRE-OPERATIVE LABORATORY EXAMINATION: ICD-10-CM

## 2025-04-24 DIAGNOSIS — Z01.810 PRE-OPERATIVE CARDIOVASCULAR EXAMINATION: ICD-10-CM

## 2025-04-24 LAB
ANION GAP SERPL CALC-SCNC: 14 MMOL/L (ref 7–16)
BUN SERPL-MCNC: 18 MG/DL (ref 8–22)
CALCIUM SERPL-MCNC: 9 MG/DL (ref 8.5–10.5)
CHLORIDE SERPL-SCNC: 104 MMOL/L (ref 96–112)
CO2 SERPL-SCNC: 22 MMOL/L (ref 20–33)
CREAT SERPL-MCNC: 0.98 MG/DL (ref 0.5–1.4)
EKG IMPRESSION: NORMAL
GFR SERPLBLD CREATININE-BSD FMLA CKD-EPI: 87 ML/MIN/1.73 M 2
GLUCOSE SERPL-MCNC: 139 MG/DL (ref 65–99)
POTASSIUM SERPL-SCNC: 4.3 MMOL/L (ref 3.6–5.5)
SODIUM SERPL-SCNC: 140 MMOL/L (ref 135–145)

## 2025-04-24 PROCEDURE — 36415 COLL VENOUS BLD VENIPUNCTURE: CPT

## 2025-04-24 PROCEDURE — 83036 HEMOGLOBIN GLYCOSYLATED A1C: CPT

## 2025-04-24 PROCEDURE — 93005 ELECTROCARDIOGRAM TRACING: CPT | Mod: TC

## 2025-04-24 PROCEDURE — 80048 BASIC METABOLIC PNL TOTAL CA: CPT

## 2025-04-24 PROCEDURE — 93010 ELECTROCARDIOGRAM REPORT: CPT | Performed by: INTERNAL MEDICINE

## 2025-04-25 LAB
EST. AVERAGE GLUCOSE BLD GHB EST-MCNC: 171 MG/DL
HBA1C MFR BLD: 7.6 % (ref 4–5.6)

## 2025-05-06 ENCOUNTER — TELEPHONE (OUTPATIENT)
Dept: UROLOGY | Facility: MEDICAL CENTER | Age: 60
End: 2025-05-06
Payer: COMMERCIAL

## 2025-05-07 ENCOUNTER — ANESTHESIA (OUTPATIENT)
Dept: SURGERY | Facility: MEDICAL CENTER | Age: 60
End: 2025-05-07
Payer: MEDICAID

## 2025-05-07 ENCOUNTER — ANESTHESIA EVENT (OUTPATIENT)
Dept: SURGERY | Facility: MEDICAL CENTER | Age: 60
End: 2025-05-07
Payer: MEDICAID

## 2025-05-07 ENCOUNTER — HOSPITAL ENCOUNTER (OUTPATIENT)
Facility: MEDICAL CENTER | Age: 60
End: 2025-05-07
Attending: UROLOGY | Admitting: UROLOGY
Payer: MEDICAID

## 2025-05-07 VITALS
SYSTOLIC BLOOD PRESSURE: 134 MMHG | DIASTOLIC BLOOD PRESSURE: 75 MMHG | RESPIRATION RATE: 18 BRPM | BODY MASS INDEX: 37.04 KG/M2 | HEART RATE: 61 BPM | TEMPERATURE: 96.8 F | HEIGHT: 67 IN | WEIGHT: 236 LBS | OXYGEN SATURATION: 95 %

## 2025-05-07 LAB — GLUCOSE BLD STRIP.AUTO-MCNC: 81 MG/DL (ref 65–99)

## 2025-05-07 PROCEDURE — 160015 HCHG STAT PREOP MINUTES: Performed by: UROLOGY

## 2025-05-07 PROCEDURE — 82962 GLUCOSE BLOOD TEST: CPT

## 2025-05-07 PROCEDURE — 99213 OFFICE O/P EST LOW 20 MIN: CPT | Performed by: UROLOGY

## 2025-05-07 RX ORDER — HALOPERIDOL 5 MG/ML
1 INJECTION INTRAMUSCULAR
Status: CANCELLED | OUTPATIENT
Start: 2025-05-07

## 2025-05-07 RX ORDER — CELECOXIB 200 MG/1
200 CAPSULE ORAL 2 TIMES DAILY
Qty: 60 CAPSULE | Refills: 0 | Status: SHIPPED | OUTPATIENT
Start: 2025-05-07

## 2025-05-07 RX ORDER — SODIUM CHLORIDE, SODIUM LACTATE, POTASSIUM CHLORIDE, CALCIUM CHLORIDE 600; 310; 30; 20 MG/100ML; MG/100ML; MG/100ML; MG/100ML
INJECTION, SOLUTION INTRAVENOUS CONTINUOUS
Status: CANCELLED | OUTPATIENT
Start: 2025-05-07

## 2025-05-07 RX ORDER — OXYCODONE HCL 5 MG/5 ML
10 SOLUTION, ORAL ORAL
Refills: 0 | Status: CANCELLED | OUTPATIENT
Start: 2025-05-07

## 2025-05-07 RX ORDER — ONDANSETRON 2 MG/ML
4 INJECTION INTRAMUSCULAR; INTRAVENOUS
Status: CANCELLED | OUTPATIENT
Start: 2025-05-07

## 2025-05-07 RX ORDER — HYDRALAZINE HYDROCHLORIDE 20 MG/ML
5 INJECTION INTRAMUSCULAR; INTRAVENOUS
Status: CANCELLED | OUTPATIENT
Start: 2025-05-07

## 2025-05-07 RX ORDER — ALBUTEROL SULFATE 5 MG/ML
2.5 SOLUTION RESPIRATORY (INHALATION)
Status: CANCELLED | OUTPATIENT
Start: 2025-05-07

## 2025-05-07 RX ORDER — EPHEDRINE SULFATE 50 MG/ML
5 INJECTION, SOLUTION INTRAVENOUS
Status: CANCELLED | OUTPATIENT
Start: 2025-05-07

## 2025-05-07 RX ORDER — OXYCODONE HCL 5 MG/5 ML
5 SOLUTION, ORAL ORAL
Refills: 0 | Status: CANCELLED | OUTPATIENT
Start: 2025-05-07

## 2025-05-07 RX ORDER — DIPHENHYDRAMINE HYDROCHLORIDE 50 MG/ML
12.5 INJECTION, SOLUTION INTRAMUSCULAR; INTRAVENOUS
Status: CANCELLED | OUTPATIENT
Start: 2025-05-07

## 2025-05-07 RX ORDER — MEPERIDINE HYDROCHLORIDE 25 MG/ML
12.5 INJECTION INTRAMUSCULAR; INTRAVENOUS; SUBCUTANEOUS
Status: CANCELLED | OUTPATIENT
Start: 2025-05-07

## 2025-05-07 RX ORDER — SODIUM CHLORIDE, SODIUM LACTATE, POTASSIUM CHLORIDE, CALCIUM CHLORIDE 600; 310; 30; 20 MG/100ML; MG/100ML; MG/100ML; MG/100ML
INJECTION, SOLUTION INTRAVENOUS CONTINUOUS
Status: DISCONTINUED | OUTPATIENT
Start: 2025-05-07 | End: 2025-05-07 | Stop reason: HOSPADM

## 2025-05-07 RX ORDER — HYDROMORPHONE HYDROCHLORIDE 1 MG/ML
0.4 INJECTION, SOLUTION INTRAMUSCULAR; INTRAVENOUS; SUBCUTANEOUS
Status: CANCELLED | OUTPATIENT
Start: 2025-05-07

## 2025-05-07 RX ORDER — HYDROMORPHONE HYDROCHLORIDE 1 MG/ML
0.2 INJECTION, SOLUTION INTRAMUSCULAR; INTRAVENOUS; SUBCUTANEOUS
Status: CANCELLED | OUTPATIENT
Start: 2025-05-07

## 2025-05-07 RX ORDER — HYDROMORPHONE HYDROCHLORIDE 1 MG/ML
0.1 INJECTION, SOLUTION INTRAMUSCULAR; INTRAVENOUS; SUBCUTANEOUS
Status: CANCELLED | OUTPATIENT
Start: 2025-05-07

## 2025-05-07 NOTE — DISCHARGE INSTRUCTIONS
-You can take the newly prescribed anti-inflammatory medication to help treat the scrotal pain twice daily; take this for about two weeks, and then if feeling improved you can cut back to once daily.  -We will arrange for a follow up visit in about one month.  -If you need anything before your appointment, call the [facility] Urology office at [phone].

## 2025-05-07 NOTE — OR NURSING
MD Consulted patient and said that condition for surgery has resolved, no need for surgery at this time. Surgery cancelled.

## 2025-05-07 NOTE — ANESTHESIA PREPROCEDURE EVALUATION
Case: [id number] Date/Time: 05/07/25 0715    Procedure: RIGHT HYDROCELECTOMY    Pre-op diagnosis: RIGHT HYDROCELE    Location: [facility] OR 06 / SURGERY [facility]    Surgeons: Joel Man M.D.            Relevant Problems   ANESTHESIA   (positive) DESTIN (obstructive sleep apnea)   (positive) Sleep apnea      CARDIAC   (positive) Essential hypertension      ENDO   (positive) Type 2 diabetes mellitus without complication, with long-term current use of insulin (HCC)       Physical Exam    Airway   Mallampati: II  TM distance: >3 FB  Neck ROM: full       Cardiovascular - normal exam  Rhythm: regular  Rate: normal  (-) murmur     Dental - normal exam        Facial Hair   Pulmonary - normal exam  Breath sounds clear to auscultation     Abdominal   (+) obese     Neurological - normal exam                   Anesthesia Plan    ASA 2       Plan - general       Airway plan will be LMA          Induction: intravenous    Postoperative Plan: Postoperative administration of opioids is intended.    Pertinent diagnostic labs and testing reviewed    Informed Consent:    Anesthetic plan and risks discussed with patient.    Use of blood products discussed with: patient whom consented to blood products.

## 2025-05-07 NOTE — H&P
Chief Complaint: scrotal pain and swelling    HPI: Joey Dawson Jr. is a 59 y.o. male with a history of somewhat acute onset of scrotal pain and swelling in February 2025, found on ultrasound to have a complex hydrocele and seen in the urology clinic in March 2025 when physical exam confirmed this finding. He was scheduled for elective hydrocelectomy and is here today for surgery.     He reports ongoing nearly daily right sided scrotal pain that occurs mostly when laying down in bed, and improves after walking for a short period of time. The pain is bothersome but does not significantly limit activity. He has no associated abdominal pain, nausea, or vomiting.       Past Medical History:  Past Medical History:   Diagnosis Date    Acute hemorrhagic CVA (Pontine hemorrhage) ([facility]) 11/09/2020    Bowel habit changes     irregular    Depression     High cholesterol     Hyperlipidemia     Hypertension     Sleep apnea     CPAP    Stroke ([facility])     x2, may 2013 and Nov 2020- loss of balance    Type II or unspecified type diabetes mellitus without mention of complication, not stated as uncontrolled     Uncontrolled type 2 diabetes mellitus with hyperglycemia ([facility]) 04/09/2015    Urinary incontinence        Past Surgical History:  Past Surgical History:   Procedure Laterality Date    COLONOSCOPY      x2       Family History:  Family History   Problem Relation Age of Onset    Cancer Mother         Breast    Breast Cancer Mother     Heart Disease Father         CHF    No Known Problems Sister        Social History:  Social History     Socioeconomic History    Marital status: Single     Spouse name: Not on file    Number of children: Not on file    Years of education: Not on file    Highest education level: Some college, no degree   Occupational History    Not on file   Tobacco Use    Smoking status: Never    Smokeless tobacco: Never   Vaping Use    Vaping status: Never Used   Substance and Sexual Activity    Alcohol  use: Yes     Alcohol/week: 1.2 oz     Comment: Maybe once a year    Drug use: No    Sexual activity: Yes   Other Topics Concern    Not on file   Social History Narrative    Not on file     Social Drivers of Health     Financial Resource Strain: Low Risk  (1/8/2023)    Overall Financial Resource Strain (CARDIA)     Difficulty of Paying Living Expenses: Not hard at all   Food Insecurity: No Food Insecurity (1/8/2023)    Hunger Vital Sign     Worried About Running Out of Food in the Last Year: Never true     Ran Out of Food in the Last Year: Never true   Transportation Needs: No Transportation Needs (1/8/2023)    PRAPARE - Transportation     Lack of Transportation (Medical): No     Lack of Transportation (Non-Medical): No   Physical Activity: Unknown (1/8/2023)    Exercise Vital Sign     Days of Exercise per Week: Patient declined     Minutes of Exercise per Session: Patient declined   Stress: No Stress Concern Present (1/8/2023)    [employer] - Occupational Stress Questionnaire     Feeling of Stress : Not at all   Social Connections: Socially Isolated (1/8/2023)    Social Connection and Isolation Panel [NHANES]     Frequency of Communication with Friends and Family: More than three times a week     Frequency of Social Gatherings with Friends and Family: More than three times a week     Attends Buddhism Services: Never     Active Member of Clubs or Organizations: No     Attends Club or Organization Meetings: Never     Marital Status: Never    Intimate Partner Violence: Not on file   Housing Stability: Low Risk  (1/8/2023)    Housing Stability Vital Sign     Unable to Pay for Housing in the Last Year: No     Number of Places Lived in the Last Year: 1     Unstable Housing in the Last Year: No       Medications:  Current Facility-Administered Medications   Medication Dose Route Frequency Provider Last Rate Last Admin    lidocaine (Xylocaine) 1 % injection 0.5 mL  0.5 mL Intradermal  "Once PRN Joel Man M.D.        lactated ringers infusion   Intravenous Continuous Joel Man M.D.           Allergies:  Allergies   Allergen Reactions    Food      Spicy cinnamon--sores in mouth and tongue swelling       Review of Systems:  Constitutional: Negative for fever, chills and malaise/fatigue.   HENT: Negative for congestion.    Eyes: Negative for pain.   Respiratory: Negative for cough and shortness of breath.    Cardiovascular: Negative for leg swelling.   Gastrointestinal: Negative for nausea, vomiting, abdominal pain and diarrhea.   Genitourinary: Negative for dysuria and hematuria.   Skin: Negative for rash.   Neurological: Negative for dizziness, focal weakness and headaches.   Endo/Heme/Allergies: Does not bruise/bleed easily.   Psychiatric/Behavioral: Negative for depression.  The patient is not nervous/anxious.        Physical Exam:  Vitals:    05/07/25 0550   BP: 134/75   Pulse: 61   Resp: 18   Temp: 36 °C (96.8 °F)   SpO2: 95%       GENERAL: well appearing, well nourished, NAD  RESP: respiratory effort normal  ABDOMEN: soft, nontender, nondistended, no masses or organomegaly  HERNIAS: no hernias found on exam  SKIN/LYMPH: normal coloration and turgor, no suspicious skin lesions noted  NEURO/PSYCH: alert, oriented, normal speech, no focal findings or movement disorder noted  EXTREMITIES: no pedal edema noted  GENITOURINARY: normal male external genitalia, penis is normal, testes descended bilaterally, no testicular masses or scrotal swelling, and no hydroceles  RECTAL: Exam Deferred    Data Review:    Labs:  POCT UA No results found for: \"POCCOLOR\", \"POCAPPEAR\", \"POCLEUKEST\", \"POCNITRITE\", \"POCUROBILIGE\", \"POCPROTEIN\", \"POCURPH\", \"POCBLOOD\", \"POCSPGRV\", \"POCKETONES\", \"POCBILIRUBIN\", \"POCGLUCUA\"   CBC   Lab Results   Component Value Date/Time    WBC 7.2 01/13/2023 1440    RBC 5.81 01/13/2023 1440    HEMOGLOBIN 15.0 01/13/2023 1440    HEMATOCRIT 46.5 01/13/2023 1440    MCV 80.0 (L) " 01/13/2023 1440    MCH 25.8 (L) 01/13/2023 1440    MCHC 32.3 (L) 01/13/2023 1440    RDW 46.6 01/13/2023 1440    MPV 10.1 01/13/2023 1440    LYMPHOCYTES 26.40 01/13/2023 1440    LYMPHS 1.90 01/13/2023 1440    MONOCYTES 7.80 01/13/2023 1440    MONOS 0.56 01/13/2023 1440    EOSINOPHILS 2.20 01/13/2023 1440    EOS 0.16 01/13/2023 1440    BASOPHILS 0.70 01/13/2023 1440    BASO 0.05 01/13/2023 1440    NRBC 0.00 01/13/2023 1440       CMP   Lab Results   Component Value Date/Time    SODIUM 140 04/24/2025 1612    POTASSIUM 4.3 04/24/2025 1612    CHLORIDE 104 04/24/2025 1612    CO2 22 04/24/2025 1612    ANION 14.0 04/24/2025 1612    GLUCOSE 139 (H) 04/24/2025 1612    BUN 18 04/24/2025 1612    CREATININE 0.98 04/24/2025 1612    GFRCKD 87 04/24/2025 1612    CALCIUM 9.0 04/24/2025 1612    CORRCALC 9.4 10/18/2024 1244    ASTSGOT 19 10/18/2024 1244    ALTSGPT 21 10/18/2024 1244    ALKPHOSPHAT 121 (H) 10/18/2024 1244    TBILIRUBIN 0.3 10/18/2024 1244    ALBUMIN 4.0 10/18/2024 1244    TOTPROTEIN 7.0 10/18/2024 1244    GLOBULIN 3.0 10/18/2024 1244    AGRATIO 1.3 10/18/2024 1244       Imaging:   [id number]  Order: [id number]   Status: Final result       Next appt: 06/04/2025 at 03:45 PM in Urology ([facility])       Dx: Testicle swelling    Test Result Released: Yes (seen)    0 Result Notes  Details    Reading Physician Reading Date Result Priority   Royce Becker M.D.  [phone]     3/11/2025      Narrative & Impression     3/11/2025 4:38 PM     HISTORY/REASON FOR EXAM: Swelling. Testicular swelling.     TECHNIQUE/EXAM DESCRIPTION:  Real-time sonography of the scrotum was performed with gray-scale, color and duplex Doppler imaging.     COMPARISON: None     FINDINGS:     The right testis measures 4.5 x 3.1 x 3.1 cm. Normal in size and echotexture. Normal vascularity on color Doppler. No intratesticular mass.     The left testis measures 3.9 x 2.5 x 3.2 cm. Normal in size and echotexture. Normal vascularity  on color Doppler. No intratesticular mass.     Vascular flow is symmetric.     Appearance of the epididymides are within normal limits.     Large complex right hydrocele. Small left hydrocele.     No varicocele is detected.     IMPRESSION:     1.  No testicular mass identified.     2. Large complex right hydrocele. Small left hydrocele.         Assessment: 59 y.o. male with a history of testicular pain and swelling, found previously to have a large and loculated right hydrocele. He is here today about 8 weeks later for elective right hydrocelectomy, but on physical exam this morning he has no remaining hydrocele. The scrotal exam is normal without testicular lesions or swelling. That said, he does continue to have near daily right sided scrotal pain. We discussed the differential diagnosis of scrotal pain, and given the positional component to his pain I suggested he may have a lower back or right hip issue to address. Meanwhile, we also discussed treatment options including scrotal elevation, ice packs, non-steroidal anti-inflammatory medications, JENNIFER-ergic medication, and spermatic cord blocks.     Given the resolution of the hydrocele I am optimistic for continued improvement in his pain, but for now we will trial a course of NSAIDS.       Plan:    -Cancel hydrocelectomy  -Discharge home  -Will send prescription for celecoxib for the next month, and follow up in 1 month; if not effective can consider additional medical therapy or a course of right spermatic cord blocks      Joel Man MD

## 2025-05-12 DIAGNOSIS — E11.65 UNCONTROLLED TYPE 2 DIABETES MELLITUS WITH HYPERGLYCEMIA (HCC): ICD-10-CM

## 2025-05-12 PROCEDURE — RXMED WILLOW AMBULATORY MEDICATION CHARGE: Performed by: INTERNAL MEDICINE

## 2025-05-12 RX ORDER — INSULIN GLARGINE 100 [IU]/ML
60 INJECTION, SOLUTION SUBCUTANEOUS EVERY EVENING
Qty: 60 ML | Refills: 2 | Status: SHIPPED | OUTPATIENT
Start: 2025-05-12

## 2025-05-15 ENCOUNTER — PHARMACY VISIT (OUTPATIENT)
Dept: PHARMACY | Facility: MEDICAL CENTER | Age: 60
End: 2025-05-15
Payer: COMMERCIAL

## 2025-06-04 ENCOUNTER — OFFICE VISIT (OUTPATIENT)
Dept: UROLOGY | Facility: MEDICAL CENTER | Age: 60
End: 2025-06-04
Payer: COMMERCIAL

## 2025-06-04 DIAGNOSIS — N43.3 HYDROCELE, RIGHT: Primary | ICD-10-CM

## 2025-06-04 PROCEDURE — 99212 OFFICE O/P EST SF 10 MIN: CPT

## 2025-06-04 NOTE — PROGRESS NOTES
Subjective  Joey Dawson Jr. is a 60 y.o. male who presents today for follow-up for right hydrocele.  He was scheduled for surgery on May 7 for a [place] colectomy, date of surgery testicle was assessed and hydrocele had resolved.    Patient reports he is not having any testicular pain, or discomfort related to hydrocele      Family History   Problem Relation Age of Onset    Cancer Mother         Breast    Breast Cancer Mother     Heart Disease Father         CHF    No Known Problems Sister        Social History     Socioeconomic History    Marital status: Single     Spouse name: Not on file    Number of children: Not on file    Years of education: Not on file    Highest education level: Some college, no degree   Occupational History    Not on file   Tobacco Use    Smoking status: Never    Smokeless tobacco: Never   Vaping Use    Vaping status: Never Used   Substance and Sexual Activity    Alcohol use: Yes     Alcohol/week: 1.2 oz     Comment: Maybe once a year    Drug use: No    Sexual activity: Yes   Other Topics Concern    Not on file   Social History Narrative    Not on file     Social Drivers of Health     Financial Resource Strain: Low Risk  (1/8/2023)    Overall Financial Resource Strain (CARDIA)     Difficulty of Paying Living Expenses: Not hard at all   Food Insecurity: No Food Insecurity (1/8/2023)    Hunger Vital Sign     Worried About Running Out of Food in the Last Year: Never true     Ran Out of Food in the Last Year: Never true   Transportation Needs: No Transportation Needs (1/8/2023)    PRAPARE - Transportation     Lack of Transportation (Medical): No     Lack of Transportation (Non-Medical): No   Physical Activity: Unknown (1/8/2023)    Exercise Vital Sign     Days of Exercise per Week: Patient declined     Minutes of Exercise per Session: Patient declined   Stress: No Stress Concern Present (1/8/2023)    [employer] [place] [employer] - Occupational Stress Questionnaire      Feeling of Stress : Not at all   Social Connections: Socially Isolated (1/8/2023)    Social Connection and Isolation Panel [NHANES]     Frequency of Communication with Friends and Family: More than three times a week     Frequency of Social Gatherings with Friends and Family: More than three times a week     Attends Quaker Services: Never     Active Member of Clubs or Organizations: No     Attends Club or Organization Meetings: Never     Marital Status: Never    Intimate Partner Violence: Not on file   Housing Stability: Low Risk  (1/8/2023)    Housing Stability Vital Sign     Unable to Pay for Housing in the Last Year: No     Number of Places Lived in the Last Year: 1     Unstable Housing in the Last Year: No       Past Surgical History[1]    Past Medical History[2]    Current Medications[3]    Allergies[4]    Objective  There were no vitals taken for this visit.  Physical Exam  Constitutional:       Appearance: Normal appearance.   HENT:      Head: Normocephalic and atraumatic.   Eyes:      Extraocular Movements: Extraocular movements intact.   Pulmonary:      Effort: Pulmonary effort is normal.   Genitourinary:     Comments: Right testicle with slight hydro palpated.  Testicles assessed bilaterally soft and contour  Skin:     General: Skin is warm and dry.   Neurological:      Mental Status: He is alert.         Labs:   None    Imaging:   None  Assessment    For right hydrocele: We can continue to monitor for pain.  Is unusual for hydrocele to resolve however patient not experiencing any pain related to hydrocele, and we will continue to monitor    Plan    Return to clinic on an as needed basis    Problem List Items Addressed This Visit    None           [1]   Past Surgical History:  Procedure Laterality Date    COLONOSCOPY      x2   [2]   Past Medical History:  Diagnosis Date    Acute hemorrhagic CVA (Pontine hemorrhage) ([facility]) 11/09/2020    Bowel habit changes     irregular    Depression     High  cholesterol     Hyperlipidemia     Hypertension     Sleep apnea     CPAP    Stroke ([facility])     x2, may 2013 and Nov 2020- loss of balance    Type II or unspecified type diabetes mellitus without mention of complication, not stated as uncontrolled     Uncontrolled type 2 diabetes mellitus with hyperglycemia ([facility]) 04/09/2015    Urinary incontinence    [3]   Current Outpatient Medications   Medication Sig Dispense Refill    insulin glargine 100 UNIT/ML Solution Pen-injector injection Inject 60 Units under the skin every evening. 60 mL 2    celecoxib (CELEBREX) 200 MG Cap Take 1 Capsule by mouth 2 times a day. 60 Capsule 0    atorvastatin (LIPITOR) 40 MG Tab TAKE ONE TABLET BY MOUTH EVERY EVENING AT BEDTIME 90 Tablet 1    amLODIPine (NORVASC) 10 MG Tab Take 1 Tablet by mouth every day. (Patient taking differently: Take 10 mg by mouth every evening.) 90 Tablet 3    sertraline (ZOLOFT) 100 MG Tab Take 1 Tablet by mouth every day. (Patient taking differently: Take 100 mg by mouth every evening.) 30 Tablet 0    lisinopril (PRINIVIL) 40 MG tablet TAKE ONE TABLET BY MOUTH EVERY DAY (Patient taking differently: Take 40 mg by mouth every evening. TAKE ONE TABLET BY MOUTH EVERY DAY) 90 Tablet 2    metFORMIN ER (GLUCOPHAGE XR) 500 MG TABLET SR 24 HR Take 2 Tablets by mouth 2 times a day. (Patient taking differently: Take 1,000 mg by mouth every evening.) 360 Tablet 3    Continuous Glucose Sensor (FREESTYLE JONES 3 SENSOR) Misc 1 Each every 14 days. 6 Each 3    dapagliflozin propanediol (FARXIGA) 10 MG Tab Take 1 Tablet by mouth every day. (Patient taking differently: Take 10 mg by mouth every evening.) 90 Tablet 3    Finerenone (KERENDIA) 10 MG Tab Take 10 mg by mouth every day. (Patient taking differently: Take 10 mg by mouth every evening.) 30 Tablet 0    Semaglutide,0.25 or 0.5MG/DOS, (OZEMPIC, 0.25 OR 0.5 MG/DOSE,) 2 MG/3ML Solution Pen-injector Inject 0.5 mg under the skin every 7 days. (Patient taking differently: Inject 0.5  mg under the skin every 7 days. Fridays) 9 mL 1    Insulin Pen Needle 32 G x 4 mm 1 Each every day. 100 Each 3    Insulin Pen Needle use for injections daily 100 Each 3    aspirin (ASA) 81 MG Chew Tab chewable tablet Chew 81 mg every evening.       No current facility-administered medications for this visit.   [4]   Allergies  Allergen Reactions    Food      Spicy cinnamon--sores in mouth and tongue swelling

## 2025-06-11 ENCOUNTER — TELEPHONE (OUTPATIENT)
Dept: PHARMACY | Facility: MEDICAL CENTER | Age: 60
End: 2025-06-11
Payer: COMMERCIAL

## 2025-06-11 PROCEDURE — RXMED WILLOW AMBULATORY MEDICATION CHARGE

## 2025-06-11 NOTE — TELEPHONE ENCOUNTER
Contact:  Phone number:[phone] (mobile)    Name of person spoken with and relationship to patient: Joey patient   Patient’s Adherence:  How patient is doing on medication: Very Well    How many missed doses and reason: 0 N/A    Any new medications: No    Any new conditions: No    Any new allergies: No    Any new side effects: No    Any new diagnoses: No    How many doses remainin    Did patient want to speak with pharmacist: No   Delivery:  Delivery date and method: 2025 via Mail     Needs by Date: 2025    Signature required: No     Any additional details for : N/A   Teach Appointment Date:  N/A   Shipping Address:  [address] [place] [id number]   Medication(name,strength and dose):  FREESTYLE JONES 3 SENSOR MISCELLANEOUS   Copay:  $0   Payment Method:  n/a $0 copay   Supplies:  NONE   Additional Information:  NONE     Perla Chung, Pharmacy Liaison/ RX Coordinator

## 2025-06-12 ENCOUNTER — PHARMACY VISIT (OUTPATIENT)
Dept: PHARMACY | Facility: MEDICAL CENTER | Age: 60
End: 2025-06-12
Payer: COMMERCIAL

## 2025-06-26 ENCOUNTER — TELEPHONE (OUTPATIENT)
Dept: PHARMACY | Facility: MEDICAL CENTER | Age: 60
End: 2025-06-26
Payer: COMMERCIAL

## 2025-06-26 PROCEDURE — RXMED WILLOW AMBULATORY MEDICATION CHARGE: Performed by: STUDENT IN AN ORGANIZED HEALTH CARE EDUCATION/TRAINING PROGRAM

## 2025-06-26 PROCEDURE — RXMED WILLOW AMBULATORY MEDICATION CHARGE

## 2025-06-26 NOTE — TELEPHONE ENCOUNTER
Contact:  Phone number:[phone] (mobile)    Name of person spoken with and relationship to patient: Joey patient   Patient’s Adherence:  How patient is doing on medication: Very Well    How many missed doses and reason: 0 N/A    Any new medications: No    Any new conditions: No    Any new allergies: No    Any new side effects: No    Any new diagnoses: No    How many doses remainin    Did patient want to speak with pharmacist: No   Delivery:  Delivery date and method: 2025 via     Needs by Date: 2025    Signature required: No     Any additional details for : N/A   Teach Appointment Date:  N/A   Shipping Address:   at [facility]   Medication(name,strength and dose):  Farxiga Tablet 10 MG metFORMIN HCl ER Tablet Extended Release 24 Hour 500 MG    Copay:  $0   Payment Method:  n/a $0 copay   Supplies:  NONE   Additional Information:  NONE     Perla Chung, Pharmacy Liaison/ RX Coordinator

## 2025-06-30 ENCOUNTER — PHARMACY VISIT (OUTPATIENT)
Dept: PHARMACY | Facility: MEDICAL CENTER | Age: 60
End: 2025-06-30
Payer: COMMERCIAL

## 2025-07-17 ENCOUNTER — TELEPHONE (OUTPATIENT)
Dept: ENDOCRINOLOGY | Facility: MEDICAL CENTER | Age: 60
End: 2025-07-17
Payer: COMMERCIAL

## 2025-07-19 ENCOUNTER — HOSPITAL ENCOUNTER (OUTPATIENT)
Dept: LAB | Facility: MEDICAL CENTER | Age: 60
End: 2025-07-19
Payer: COMMERCIAL

## 2025-07-19 DIAGNOSIS — E55.9 VITAMIN D DEFICIENCY: ICD-10-CM

## 2025-07-19 DIAGNOSIS — R80.9 MICROALBUMINURIA: ICD-10-CM

## 2025-07-19 LAB
25(OH)D3 SERPL-MCNC: 28 NG/ML (ref 30–100)
ALBUMIN SERPL BCP-MCNC: 3.9 G/DL (ref 3.2–4.9)
ALBUMIN/GLOB SERPL: 1.3 G/DL
ALP SERPL-CCNC: 107 U/L (ref 30–99)
ALT SERPL-CCNC: 15 U/L (ref 2–50)
ANION GAP SERPL CALC-SCNC: 14 MMOL/L (ref 7–16)
AST SERPL-CCNC: 20 U/L (ref 12–45)
BILIRUB SERPL-MCNC: 0.3 MG/DL (ref 0.1–1.5)
BUN SERPL-MCNC: 16 MG/DL (ref 8–22)
CALCIUM ALBUM COR SERPL-MCNC: 9 MG/DL (ref 8.5–10.5)
CALCIUM SERPL-MCNC: 8.9 MG/DL (ref 8.5–10.5)
CHLORIDE SERPL-SCNC: 105 MMOL/L (ref 96–112)
CO2 SERPL-SCNC: 18 MMOL/L (ref 20–33)
CREAT SERPL-MCNC: 0.92 MG/DL (ref 0.5–1.4)
CREAT UR-MCNC: 70.6 MG/DL
GFR SERPLBLD CREATININE-BSD FMLA CKD-EPI: 94 ML/MIN/1.73 M 2
GLOBULIN SER CALC-MCNC: 2.9 G/DL (ref 1.9–3.5)
GLUCOSE SERPL-MCNC: 108 MG/DL (ref 65–99)
MICROALBUMIN UR-MCNC: 10.6 MG/DL
MICROALBUMIN/CREAT UR: 150 MG/G (ref 0–30)
POTASSIUM SERPL-SCNC: 4.1 MMOL/L (ref 3.6–5.5)
PROT SERPL-MCNC: 6.8 G/DL (ref 6–8.2)
SODIUM SERPL-SCNC: 137 MMOL/L (ref 135–145)

## 2025-07-19 PROCEDURE — 80053 COMPREHEN METABOLIC PANEL: CPT

## 2025-07-19 PROCEDURE — 36415 COLL VENOUS BLD VENIPUNCTURE: CPT

## 2025-07-19 PROCEDURE — 82306 VITAMIN D 25 HYDROXY: CPT

## 2025-07-19 PROCEDURE — 82043 UR ALBUMIN QUANTITATIVE: CPT

## 2025-07-19 PROCEDURE — 82570 ASSAY OF URINE CREATININE: CPT

## 2025-07-23 ENCOUNTER — OFFICE VISIT (OUTPATIENT)
Dept: ENDOCRINOLOGY | Facility: MEDICAL CENTER | Age: 60
End: 2025-07-23
Payer: COMMERCIAL

## 2025-07-23 VITALS
DIASTOLIC BLOOD PRESSURE: 65 MMHG | WEIGHT: 242 LBS | OXYGEN SATURATION: 92 % | SYSTOLIC BLOOD PRESSURE: 137 MMHG | HEIGHT: 67 IN | BODY MASS INDEX: 37.98 KG/M2 | HEART RATE: 73 BPM

## 2025-07-23 DIAGNOSIS — I10 ESSENTIAL HYPERTENSION: ICD-10-CM

## 2025-07-23 DIAGNOSIS — E11.65 UNCONTROLLED TYPE 2 DIABETES MELLITUS WITH HYPERGLYCEMIA (HCC): Primary | ICD-10-CM

## 2025-07-23 DIAGNOSIS — E11.69 HYPERLIPIDEMIA ASSOCIATED WITH TYPE 2 DIABETES MELLITUS (HCC): ICD-10-CM

## 2025-07-23 DIAGNOSIS — R80.9 MICROALBUMINURIA: ICD-10-CM

## 2025-07-23 DIAGNOSIS — F43.21 SITUATIONAL DEPRESSION: ICD-10-CM

## 2025-07-23 DIAGNOSIS — E78.5 HYPERLIPIDEMIA ASSOCIATED WITH TYPE 2 DIABETES MELLITUS (HCC): ICD-10-CM

## 2025-07-23 DIAGNOSIS — E55.9 VITAMIN D DEFICIENCY: ICD-10-CM

## 2025-07-23 PROCEDURE — 3078F DIAST BP <80 MM HG: CPT

## 2025-07-23 PROCEDURE — 95250 CONT GLUC MNTR PHYS/QHP EQP: CPT

## 2025-07-23 PROCEDURE — 3075F SYST BP GE 130 - 139MM HG: CPT

## 2025-07-23 PROCEDURE — 99213 OFFICE O/P EST LOW 20 MIN: CPT

## 2025-07-23 PROCEDURE — 99214 OFFICE O/P EST MOD 30 MIN: CPT

## 2025-07-23 PROCEDURE — 95251 CONT GLUC MNTR ANALYSIS I&R: CPT

## 2025-07-23 RX ORDER — SEMAGLUTIDE 1.34 MG/ML
1 INJECTION, SOLUTION SUBCUTANEOUS
Qty: 9 ML | Refills: 3 | Status: SHIPPED | OUTPATIENT
Start: 2025-07-23

## 2025-07-23 NOTE — PROGRESS NOTES
CHIEF COMPLAINT: Patient is here for follow up of   HPI:   Joey Dawson is a 57 y.o. male     Type 2 diabetes mellitus with hyperglycemia:   Unable to do A1c in clinic. One day too early  POC A1c on 4/24/25 was 7.6  POC A1c on 1/14/25 is 7.2  POC A1c on 10/15/24 was 7.6   Latest Reference Range & Units 10/11/22 09:14 04/12/23 11:29 10/18/23 15:22   Glycohemoglobin 5.8 % 7.4 ! 7.4 ! 7.0 !       Medication regimen:  Glargine insulin 60 units per day  Farxiga 10 mg daily  Ozempic 0.50 mg weekly  Metformin 1000 mg daily    Denies hypoglycemia         He reports occasional episode of hypoglycemia      Diabetes Complications   Retinopathy: No known retinopathy.    Last eye exam: scheduled for Sept 2025  Referred to ophthalmology at [facility]. Dr. Shetty    Neuropathy: Denies paresthesias or numbness in hands or feet.   Denies any foot wounds.      Exercise: walking dog 2-3 times a week  Diet: Better, less fast food as he used to.     2.  Microalbuminuria:  Currently taking lisinopril 40 mg daily  Kerendia 10 mg daily  Blood pressure today at 122/60   Latest Reference Range & Units 07/19/25 11:01   Micro Alb Creat Ratio 0 - 30 mg/g 150 (H)   Creatinine, Urine mg/dL 70.60   Microalbumin, Urine Random mg/dL 10.6      Latest Reference Range & Units 07/19/25 11:01   Potassium 3.6 - 5.5 mmol/L 4.1      Latest Reference Range & Units 07/19/25 11:01   Bun 8 - 22 mg/dL 16   Creatinine 0.50 - 1.40 mg/dL 0.92   GFR (CKD-EPI) >60 mL/min/1.73 m 2 94     3.Dyslipidemia:  Hx of stroke Nov 2014 and 2020  He has hyperlipidemia and is on atorvastatin 40 mg daily   Latest Reference Range & Units 10/18/24 12:44   Cholesterol,Tot 100 - 199 mg/dL 120   Triglycerides 0 - 149 mg/dL 90   HDL >=40 mg/dL 36 !   LDL <100 mg/dL 66     4.  Essential hypertension:  Currently taking lisinopril 40 mg daily, Amlodipine 10 mg daily,   BP at 137/65    5.  Vitamin D deficiency:  Currently not on supplement.    Latest Reference Range & Units 07/19/25  11:01   25-Hydroxy   Vitamin D 25 30 - 100 ng/mL 28 (L)      Latest Reference Range & Units 10/18/24 12:44   TSH 0.350 - 5.500 uIU/mL 0.700   Free T-4 0.93 - 1.70 ng/dL 1.13     Depression:  Currently taking sertiline.   Dose was increased to 100 mg daily per pcp due to worsening of depression  He is currently not seeing a therapist due to new work    Patient's medications, allergies, and social histories were reviewed and updated as appropriate.    ROS:     CONS:     No fever, no chills   EYES:     No diplopia, no blurry vision   CV:           No chest pain, no palpitations   PULM:     No SOB, no cough, no hemoptysis.   GI:            No nausea, no vomiting, no diarrhea, no constipation   ENDO:     No polyuria, no polydipsia, no heat intolerance, no cold intolerance       Past Medical History:  Problem List:  2025-06: Hydrocele, right  2023-06: Type 2 diabetes mellitus without complication, with long-  term current use of insulin ([facility])  2023-01: Healthcare maintenance  2022-06: Hyperlipidemia associated with type 2 diabetes mellitus ([facility])  2021-09: Diabetes mellitus ([facility])  2020-12: Essential hypertension  2020-12: Thyroid nodule  2020-11: DESTIN (obstructive sleep apnea)  2020-11: Acute hemorrhagic CVA (Pontine hemorrhage) ([facility])  2020-11: Elevated troponin  2020-10: Situational depression  2020-10: Trochanteric bursitis of left hip  2019-05: Soft tissue mass  2019-05: Obesity (BMI 30-39.9)  2015-06: ED (erectile dysfunction)  2015-04: Vitamin D deficiency  2015-04: RBC microcytosis  2015-04: Microalbuminuria  2015-04: Uncontrolled type 2 diabetes mellitus with hyperglycemia   ([facility])  2015-04: Hypertensive emergency  2015-04: Sleep apnea  2015-04: Mixed hyperlipidemia  2015-04: History of CVA (cerebrovascular accident) without residual   deficits      Past Surgical History:  Past Surgical History:   Procedure Laterality Date    COLONOSCOPY      x2        Allergies:  Food     Social History:  Social History     Tobacco  "Use    Smoking status: Never    Smokeless tobacco: Never   Vaping Use    Vaping status: Never Used   Substance Use Topics    Alcohol use: Yes     Alcohol/week: 1.2 oz     Comment: Maybe once a year    Drug use: No        Family History:   family history includes Breast Cancer in his mother; Cancer in his mother; Heart Disease in his father; No Known Problems in his sister.      PHYSICAL EXAM:   OBJECTIVE:  Vital signs: /65 (BP Location: Left arm, Patient Position: Sitting, BP Cuff Size: Adult long)   Pulse 73   Ht 1.702 m (5' 7\")   Wt 110 kg (242 lb)   SpO2 92%   BMI 37.90 kg/m²   GENERAL: Well-developed, well-nourished in no apparent distress.   SKIN: No rashes, lesions.     Labs:  Lab Results   Component Value Date/Time    HBA1C 7.6 (H) 04/24/2025 04:12 PM      Lab Results   Component Value Date/Time    SODIUM 137 07/19/2025 11:01 AM    POTASSIUM 4.1 07/19/2025 11:01 AM    CHLORIDE 105 07/19/2025 11:01 AM    CO2 18 (L) 07/19/2025 11:01 AM    ANION 14.0 07/19/2025 11:01 AM    GLUCOSE 108 (H) 07/19/2025 11:01 AM    BUN 16 07/19/2025 11:01 AM    CREATININE 0.92 07/19/2025 11:01 AM    CALCIUM 8.9 07/19/2025 11:01 AM    ASTSGOT 20 07/19/2025 11:01 AM    ALTSGPT 15 07/19/2025 11:01 AM    TBILIRUBIN 0.3 07/19/2025 11:01 AM    ALBUMIN 3.9 07/19/2025 11:01 AM    TOTPROTEIN 6.8 07/19/2025 11:01 AM    GLOBULIN 2.9 07/19/2025 11:01 AM    AGRATIO 1.3 07/19/2025 11:01 AM       Lab Results   Component Value Date/Time    CHOLSTRLTOT 104 02/06/2021 0856    TRIGLYCERIDE 76 02/06/2021 0856    HDL 33 (A) 02/06/2021 0856    LDL 56 02/06/2021 0856       Lab Results   Component Value Date/Time    MALBCRT 150 (H) 07/19/2025 11:01 AM    MICROALBUR 10.6 07/19/2025 11:01 AM        ASSESSMENT/PLAN:   1. Uncontrolled type 2 diabetes mellitus with hyperglycemia (HCC) (Primary)  Stable   A1c 7.2  Medication regimen:  Metformin 1000 mg daily -Continue   Glargine 60 units at night - change to glargine 58 units at bedtime. Titrate down " by 1 unit every for blood sugars <80 every 3ams.   Ozempic 0.5 mg weekly - change to ozempic 1 mg weekly  Farxiga 10 mg daily - continue  CGM downloaded and reviewed  - Exercise for least 150 minutes/week  - Stable hydration  - Daily feet check  - Maintain a healthy diet, minimal carbohydrate, portion control   - Semaglutide, 1 MG/DOSE, (OZEMPIC, 1 MG/DOSE,) 4 MG/3ML Solution Pen-injector; Inject 1 mg under the skin every 7 days.  Dispense: 9 mL; Refill: 3  - HEMOGLOBIN A1C; Future    2. Hyperlipidemia associated with type 2 diabetes mellitus (HCC)  Stable  Continue current regimen- see HPI  - Lipid Profile; Future    3. Essential hypertension  Stable  Continue current regimen- see hPI    4. Microalbuminuria  Unstable - improving  Medication:  Kerendia 10 mg daily - change to kerendia 20 mg daily  Lisinopril 40 mg daily - continue    5. Vitamin D deficiency  Stable  Recommend otc vitamin D 3 2000 IU daily    6. Situational depression  stable  Continue current regimen- see HPI  This is followed by pcp and therapist     Disposition: Return in about 3 months (around 10/23/2025).   Do your blood work 2 weeks prior to next appointment    Thank you kindly for allowing me to participate in the diabetes care plan for this patient.      Gasper Parker A.P.R.N.   7/23/25      CC:   Pcp Pt States None

## 2025-07-25 ENCOUNTER — TELEPHONE (OUTPATIENT)
Dept: PHARMACY | Facility: MEDICAL CENTER | Age: 60
End: 2025-07-25
Payer: COMMERCIAL

## 2025-07-25 ENCOUNTER — HOSPITAL ENCOUNTER (OUTPATIENT)
Dept: LAB | Facility: MEDICAL CENTER | Age: 60
End: 2025-07-25
Payer: COMMERCIAL

## 2025-07-25 DIAGNOSIS — E78.5 HYPERLIPIDEMIA ASSOCIATED WITH TYPE 2 DIABETES MELLITUS (HCC): ICD-10-CM

## 2025-07-25 DIAGNOSIS — E11.65 UNCONTROLLED TYPE 2 DIABETES MELLITUS WITH HYPERGLYCEMIA (HCC): ICD-10-CM

## 2025-07-25 DIAGNOSIS — E11.69 HYPERLIPIDEMIA ASSOCIATED WITH TYPE 2 DIABETES MELLITUS (HCC): ICD-10-CM

## 2025-07-25 LAB
CHOLEST SERPL-MCNC: 131 MG/DL (ref 100–199)
EST. AVERAGE GLUCOSE BLD GHB EST-MCNC: 146 MG/DL
FASTING STATUS PATIENT QL REPORTED: NORMAL
HBA1C MFR BLD: 6.7 % (ref 4–5.6)
HDLC SERPL-MCNC: 34 MG/DL
LDLC SERPL CALC-MCNC: 64 MG/DL
TRIGL SERPL-MCNC: 164 MG/DL (ref 0–149)

## 2025-07-25 PROCEDURE — 36415 COLL VENOUS BLD VENIPUNCTURE: CPT

## 2025-07-25 PROCEDURE — 83036 HEMOGLOBIN GLYCOSYLATED A1C: CPT

## 2025-07-25 PROCEDURE — 80061 LIPID PANEL: CPT

## 2025-07-25 PROCEDURE — RXMED WILLOW AMBULATORY MEDICATION CHARGE

## 2025-07-25 NOTE — TELEPHONE ENCOUNTER
Contact:  Phone number:[phone] (mobile)    Name of person spoken with and relationship to patient: Joey patient   Patient’s Adherence:  How patient is doing on medication: Very Well    How many missed doses and reason: 0 N/A    Any new medications: No    Any new conditions: No    Any new allergies: No    Any new side effects: No    Any new diagnoses: No    How many doses remainin    Did patient want to speak with pharmacist: No   Delivery:  Delivery date and method: 2025 via      Needs by Date: 2025    Signature required: No     Any additional details for : N/A   Teach Appointment Date:  N/A   Shipping Address:   at [facility]   Medication(name,strength and dose):  OZEMPIC (1 MG/DOSE) SOLUTION PEN-INJECTOR 4 MG/3ML   Copay:  $0   Payment Method:  n/a $0 copay   Supplies:  Alcohol Swabs   Additional Information:  NONE     Perla Chung, Pharmacy Liaison/ RX Coordinator

## 2025-07-29 ENCOUNTER — PHARMACY VISIT (OUTPATIENT)
Dept: PHARMACY | Facility: MEDICAL CENTER | Age: 60
End: 2025-07-29
Payer: COMMERCIAL

## 2025-08-18 PROCEDURE — RXMED WILLOW AMBULATORY MEDICATION CHARGE: Performed by: INTERNAL MEDICINE

## 2025-08-19 PROCEDURE — RXMED WILLOW AMBULATORY MEDICATION CHARGE

## 2025-08-20 ENCOUNTER — SPECIALTY PHARMACY (OUTPATIENT)
Dept: ENDOCRINOLOGY | Facility: MEDICAL CENTER | Age: 60
End: 2025-08-20
Payer: COMMERCIAL

## 2025-08-20 DIAGNOSIS — E11.65 UNCONTROLLED TYPE 2 DIABETES MELLITUS WITH HYPERGLYCEMIA (HCC): ICD-10-CM

## 2025-08-20 DIAGNOSIS — R80.9 MICROALBUMINURIA: Primary | ICD-10-CM

## 2025-08-20 PROCEDURE — RXMED WILLOW AMBULATORY MEDICATION CHARGE

## 2025-08-20 RX ORDER — FINERENONE 10 MG/1
20 TABLET, FILM COATED ORAL DAILY
Qty: 56 TABLET | Refills: 0 | Status: SHIPPED | OUTPATIENT
Start: 2025-08-20

## 2025-08-20 RX ORDER — ACYCLOVIR 800 MG/1
1 TABLET ORAL
Qty: 6 EACH | Refills: 3 | Status: SHIPPED | OUTPATIENT
Start: 2025-08-20

## 2025-08-21 ENCOUNTER — PHARMACY VISIT (OUTPATIENT)
Dept: PHARMACY | Facility: MEDICAL CENTER | Age: 60
End: 2025-08-21
Payer: COMMERCIAL

## (undated) DEVICE — ADHESIVE DERMABOND HVD MINI (12EA/BX)

## (undated) DEVICE — SUCTION INSTRUMENT YANKAUER BULBOUS TIP W/O VENT (50EA/CA)

## (undated) DEVICE — SPONGE PEANUT - (5/PK 50PK/CA)

## (undated) DEVICE — CLIP SM INTNL HRZN TI ESCP LGT - (24EA/PK 25PK/BX)

## (undated) DEVICE — DRAPE LARGE 3 QUARTER - (20/CA)

## (undated) DEVICE — TOWEL STOP TIMEOUT SAFETY FLAG (40EA/CA)

## (undated) DEVICE — SUTURE 3-0 CHROMIC GUT SH 27 (36PK/BX)"

## (undated) DEVICE — TRAY SRGPRP PVP IOD WT PRP - (20/CA)

## (undated) DEVICE — SHEET PEDIATRIC LAPAROTOMY - (10/CA)

## (undated) DEVICE — PACK MINOR BASIN - (4EA/CA)

## (undated) DEVICE — SUTURE 3-0 VICRYL PLUS SH - 27 INCH (36/BX)

## (undated) DEVICE — TUBE CONNECTING SUCTION - CLEAR PLASTIC STERILE 72 IN (50EA/CA)

## (undated) DEVICE — SENSOR OXIMETER ADULT SPO2 RD SET (20EA/BX)

## (undated) DEVICE — GAUZE FLUFF STERILE 2-PLY 36 X 36 (100EA/CA)

## (undated) DEVICE — SODIUM CHL IRRIGATION 0.9% 1000ML (12EA/CA)

## (undated) DEVICE — ATHLETIC SUPPORTER LARGE - (1/EA)

## (undated) DEVICE — DRAIN PENROSE 3/8 IN X 12 IN - STERILE (25EA/BX)

## (undated) DEVICE — HUMID-VENT HEAT AND MOISTURE EXCHANGE- (50/BX)

## (undated) DEVICE — ELECTRODE DUAL RETURN W/ CORD - (50/PK)

## (undated) DEVICE — SUTURE  MONOCRYL PLUS UD 27IN(70CM) USP4-0(M1.5) S/A PS-2 PRM MP (36EA/BX)

## (undated) DEVICE — WATER IRRIGATION STERILE 1000ML (12EA/CA)

## (undated) DEVICE — BOVIE BLADE COATED &INSULATED - 25/PK

## (undated) DEVICE — BAG SPONGE COUNT 10.25 X 32 - BLUE (250/CA)

## (undated) DEVICE — CANISTER SUCTION RIGID RED 1500CC (40EA/CA)

## (undated) DEVICE — NEEDLE NON SAFETY 25 GA X 1 1/2 IN HYPO (100EA/BX)

## (undated) DEVICE — GOWN WARMING STANDARD FLEX - (30/CA)

## (undated) DEVICE — TOWELS CLOTH SURGICAL - (4/PK 20PK/CA)